# Patient Record
Sex: FEMALE | Race: WHITE | NOT HISPANIC OR LATINO | Employment: FULL TIME | URBAN - NONMETROPOLITAN AREA
[De-identification: names, ages, dates, MRNs, and addresses within clinical notes are randomized per-mention and may not be internally consistent; named-entity substitution may affect disease eponyms.]

---

## 2018-05-23 ENCOUNTER — OFFICE VISIT (OUTPATIENT)
Dept: SURGERY | Facility: CLINIC | Age: 68
End: 2018-05-23

## 2018-05-23 VITALS
HEIGHT: 68 IN | DIASTOLIC BLOOD PRESSURE: 89 MMHG | SYSTOLIC BLOOD PRESSURE: 140 MMHG | HEART RATE: 61 BPM | BODY MASS INDEX: 28.76 KG/M2 | WEIGHT: 189.8 LBS

## 2018-05-23 DIAGNOSIS — Z80.41 FAMILY HISTORY OF OVARIAN CANCER: ICD-10-CM

## 2018-05-23 DIAGNOSIS — N63.0 BREAST MASS: Primary | ICD-10-CM

## 2018-05-23 PROCEDURE — 99213 OFFICE O/P EST LOW 20 MIN: CPT | Performed by: SURGERY

## 2018-05-23 RX ORDER — CARVEDILOL 12.5 MG/1
12.5 TABLET ORAL 2 TIMES DAILY WITH MEALS
COMMUNITY
Start: 2018-05-09 | End: 2018-11-05 | Stop reason: ALTCHOICE

## 2018-05-23 NOTE — PROGRESS NOTES
Subjective   Lyla Vazquez is a 67 y.o. female here today for lump in left breast referred by Dr. Braden.    History of Present Illness  Ms. Vazquez was seen in the office today for breast evaluation.  This is a 67-year-old female presents with a history of a left breast mass which is tender.  The patient underwent a bilateral diagnostic mammogram on 4/26/18 which demonstrated a right breast asymmetry.  The patient then underwent bilateral ultrasound on 5/15/18 which was negative.  The patient denies nipple discharge.  There is no prior history of breast biopsy or cyst aspiration.  As far as risk factors go, Lyla was 29 at the time that she had her first child, with an onset of menses at age 15.  Family history is positive for mother with ovarian cancer in a sister with ovarian cancer at age 47.  The patient underwent a complete hysterectomy in 1996.  Allergies   Allergen Reactions   • Sulfa Antibiotics      Current Outpatient Prescriptions   Medication Sig Dispense Refill   • amitriptyline (ELAVIL) 10 MG tablet Take  by mouth.     • amitriptyline (ELAVIL) 50 MG tablet Take  by mouth.     • diltiazem LA (CARDIZEM LA) 240 MG 24 hr tablet Take 1 tablet by mouth daily.     • estrogens, conjugated, (PREMARIN) 0.625 MG tablet Take 1 tablet by mouth daily.     • furosemide (LASIX) 20 MG tablet Take 1 tablet by mouth daily.     • gabapentin (NEURONTIN) 300 MG capsule Take 1 capsule by mouth 2 (two) times a day.     • HYDROcodone-acetaminophen (NORCO) 7.5-325 MG per tablet Take  by mouth. Take 1 tablet every 4 to 6 hours as needed for pain     • isosorbide mononitrate (IMDUR) 30 MG 24 hr tablet Take 1 tablet by mouth daily.     • ketoprofen (ORUDIS) 75 MG capsule Take 1 capsule by mouth 2 (two) times a day.     • lisinopril (PRINIVIL,ZESTRIL) 40 MG tablet Take  by mouth.     • carvedilol (COREG) 12.5 MG tablet        No current facility-administered medications for this visit.      Past Medical History:   Diagnosis Date   •  "Arthritis    • Arthritis    • Gastric ulcer    • Hypertension    • Myocardial infarction    • Stroke    • Varicose veins      Past Surgical History:   Procedure Laterality Date   • CHOLECYSTECTOMY      laparoscopic   • HYSTERECTOMY     • OTHER SURGICAL HISTORY      leg repair   • STOMACH SURGERY       Review of Systems  General: weight loss 40 lbs  Integumentary: negative  Eyes: negative  ENT: negative  Respiratory: negative  Gastrointestinal: loss of appetite, heartburn and diarrhea  Cardiovascular: negative  Neurological: negative  Psychiatric: negative  Hematologic/Lymphatic: negative  Genitourinary: negative  Musculoskeletal: painful joints, sore muscles and back pain  Endocrine: negative  Breasts: breast lump and breast pain        Objective   /89 (BP Location: Left arm, Patient Position: Sitting)   Pulse 61   Ht 172.7 cm (68\")   Wt 86.1 kg (189 lb 12.8 oz)   BMI 28.86 kg/m²   Physical Exam  General:  This is a WD WN white female in no acute distress  Vital signs stable, afebrile  HEENT exam:  WNL. Sclera are anicteric.  EOMI  Neck:  supple, FROM.  No JVD.  Trachea midline.  No supraclavicular adenopathy  Lungs:  Respiratory effort normal. Auscultation: Clear, without wheezes, rhonchi, rales  Heart:  Regular rate and rhythm, without murmur, gallop, rub.  No pedal edema  Breasts: On visual inspection the breasts are symmetrical.  Examination of the right breast demonstrates it to be very tender in the upper outer quadrant.  A definite discrete mass is not identifiable on examination.  There is no skin change or axillary adenopathy.  The area of patient's clinical concern she could not point with one finger a definite discrete mass, nor could one be appreciated on exam.  Examination of the left breast demonstrates no discrete mass, skin change, or axillary adenopathy.  Abdomen: Nontender, without hepatosplenomegaly  Musculoskeletal:  muscle strength/tone is normal.    Psyc:  alert, oriented x 3.  Mood " and affect are appropriate  skin:  Warm with good turgor.  Without rash or lesion  extremities:  Examination of the extremities revealed no cyanosis, clubbing or edema.    Results/Data  Mammogram and ultrasound reports and images were reviewed and I with the assessment  Procedures       Assessment/Plan   Family history of ovarian cancer  Clinical right breast mass and right breast tenderness    Given the concern for clinical findings I am going to schedule a right mammogram with tomosynthesis to further evaluate  Track genetic testing results       Discussion/Summary    Patient's Body mass index is 28.86 kg/m². BMI is above normal parameters. Recommendations include: educational material.       Errors in dictation may reflect use of voice recognition software and not all errors in transcription may have been detected prior to signing.    No future appointments.

## 2018-05-26 PROBLEM — N63.0 BREAST MASS: Status: ACTIVE | Noted: 2018-05-26

## 2018-05-26 PROBLEM — Z80.41 FAMILY HISTORY OF OVARIAN CANCER: Status: ACTIVE | Noted: 2018-05-26

## 2018-06-18 ENCOUNTER — TELEPHONE (OUTPATIENT)
Dept: SURGERY | Facility: CLINIC | Age: 68
End: 2018-06-18

## 2018-07-11 ENCOUNTER — HOSPITAL ENCOUNTER (OUTPATIENT)
Dept: MAMMOGRAPHY | Facility: HOSPITAL | Age: 68
Discharge: HOME | End: 2018-07-11
Admitting: SURGERY

## 2018-07-11 DIAGNOSIS — N63.0 BREAST MASS: ICD-10-CM

## 2018-07-11 DIAGNOSIS — N63.11 MASS OF UPPER OUTER QUADRANT OF RIGHT BREAST: ICD-10-CM

## 2018-07-11 PROCEDURE — 77065 DX MAMMO INCL CAD UNI: CPT

## 2018-07-11 PROCEDURE — G0279 TOMOSYNTHESIS, MAMMO: HCPCS | Performed by: RADIOLOGY

## 2018-07-11 PROCEDURE — 77065 DX MAMMO INCL CAD UNI: CPT | Performed by: RADIOLOGY

## 2018-11-04 NOTE — PROGRESS NOTES
[facility] at [facility]  INITIAL OFFICE CONSULT      Lyla Vazquez  1950  PCP: Joyce Braden MD    SUBJECTIVE:   Lyla Vazquez is a 68 y.o. female seen for a consultation visit regarding the following: HTN, near syncope, chest pain, edema    Chief Complaint:   Chief Complaint   Patient presents with   • Fluid Retention   • SWELLING IN LEGS   • Chest Pain          Consultation is requested by Joyce Braden MD for evaluation of Fluid Retention; SWELLING IN LEGS; and Chest Pain    Problem List:   1. Nonobstructive CAD   A)C 10/14/13: Nonobstructive CAD, Normal EF Dr. Zamora   B)Echocardiogram 10/14/13, EF 55%. No VHD. Diastolic dysfunction noted.   2. HTN  3. Chronic edema, varicose veins  4. History of CVA  5. Chronic back pain  6. 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 Chronic anemia  8. Hx of Gastric Ulcer  9. Bradycardia       History:  The patient is a pleasant 68-year-old female [facility] with Dr. Aeljo Zamora regarding history of coronary disease hypertension and lower extremity edema.  She was last seen by Dr. Zamora 2013 during this time showed a left heart catheterization revealing nonobstructive disease.  An echocardiogram revealed normal LV function with diastolic dysfunction noted.  She was placed on medications to help her Lotrimin edema with some improvement.  However, she states in the past few months her lotion edema has progressively become worse.  She also notes her blood pressure is not always completely control.  About a month ago she near-syncope when she was at work she was standing all day almost a 12 hour shift she became dizzy lightheaded has down for a while.  She feels that she was just over work that they may be not been hydrated well.  In addition above she has atypical chest pain with occasional pressure that she's had in the past.  She denies any heart flow symptoms such as orthopnea, PND or persistent edema despite  "propping her feet up.  She states her blood pressure is somewhat uncontrolled.  She also states that her heart rate has been running \"low\" on the carvedilol.  She denies any josefina syncope.  She has exertional chest pain.  She presented to her primary care provider who recommended further cardiac evaluation.      Cardiac PMH: (Old records have been reviewed and summarized below)        Past Medical History, Past Surgical History, Family history, Social History, and Medications were all reviewed with the patient today and updated as necessary.     Current Outpatient Prescriptions   Medication Sig Dispense Refill   • estrogens, conjugated, (PREMARIN) 0.625 MG tablet Take 1 tablet by mouth daily.     • gabapentin (NEURONTIN) 300 MG capsule Take 600 mg by mouth 3 (Three) Times a Day.     • HYDROcodone-acetaminophen (NORCO) 7.5-325 MG per tablet Take  by mouth 2 (Two) Times a Day. Take 1 tablet every 4 to 6 hours as needed for pain     • omeprazole (priLOSEC) 40 MG capsule Take 40 mg by mouth As Needed.     • carvedilol (COREG) 6.25 MG tablet Take 1 tablet by mouth 2 (Two) Times a Day. 60 tablet 6   • lisinopril-hydrochlorothiazide (ZESTORETIC) 10-12.5 MG per tablet Take 1 tablet by mouth Daily. 30 tablet 6     No current facility-administered medications for this visit.      Allergies   Allergen Reactions   • Sulfa Antibiotics          Past Medical History:   Diagnosis Date   • Arthritis    • Arthritis    • Gastric ulcer    • Hypertension    • Myocardial infarction (CMS/HCC)    • Stroke (CMS/HCC)    • Varicose veins      Past Surgical History:   Procedure Laterality Date   • CHOLECYSTECTOMY      laparoscopic   • HYSTERECTOMY      benign   • OTHER SURGICAL HISTORY      leg repair   • STOMACH SURGERY       Family History   Problem Relation Age of Onset   • Diabetes Other    • Hypertension Other    • Ovarian cancer Other    • Cancer Mother         bladder   • Cancer Sister         liver   • Diabetes Sister    • Diabetes " "Brother    • Breast cancer Neg Hx      Social History   Substance Use Topics   • Smoking status: Never Smoker   • Smokeless tobacco: Never Used   • Alcohol use No       ROS:  Review of Symptoms:  General: no recent weight loss/gain, weakness or fatigue  Skin: no rashes, lumps, or other skin changes  HEENT: Near syncope when at work, now resolved.   Respiratory: SOB.   Cardiovascular: no palpitations, and tachycardia  Gastrointestinal: no black/tarry stools or diarrhea  Urinary: no change in frequency or urgency  Peripheral Vascular: no claudication or leg cramps  Musculoskeletal: OA  Psychiatric: no depression or excessive stress  Neurological: Peripheral neuropathy  Hematologic: Hx of chronic anemia.   Endocrine: no thyroid problems, nor heat or cold intolerance       PHYSICAL EXAM:   /82 (BP Location: Left arm, Patient Position: Sitting)   Pulse 62   Ht 172.7 cm (68\")   Wt 81.2 kg (179 lb)   BMI 27.22 kg/m²      Wt Readings from Last 5 Encounters:   11/05/18 81.2 kg (179 lb)   05/23/18 86.1 kg (189 lb 12.8 oz)   10/13/16 96.6 kg (213 lb)   09/28/16 96.6 kg (213 lb)   06/06/16 104 kg (230 lb)     BP Readings from Last 5 Encounters:   11/05/18 144/82   05/23/18 140/89   10/13/16 120/85   09/28/16 135/69   03/08/16 120/99       General-Well Nourished, Well developed  Eyes - PERRLA  Neck- supple, No mass  CV- regular rate and rhythm, no MRG  Lung- clear bilaterally  Abd- soft, +BS  Musc/skel - Norm strength and range of motion  Skin- warm and dry  Neuro - Alert & Oriented x 3, appropriate mood.    Medical problems and test results were reviewed with the patient today.     Results for orders placed or performed in visit on 10/07/16   Ferritin   Result Value Ref Range    Ferritin 37.00 10.00 - 290.30 ng/mL   Iron Profile   Result Value Ref Range    Iron 31 (L) 49 - 151 mcg/dL    TIBC 425 (H) 241 - 421 mcg/dL    Iron Saturation 7 (L) 15 - 50 %   Vitamin B12   Result Value Ref Range    Vitamin B-12 1,311 (H) 211 " - 911 pg/mL   Methylmalonic Acid, Serum   Result Value Ref Range    Methylmalonic Acid 183 0 - 378 nmol/L   Basic Metabolic Panel   Result Value Ref Range    Glucose 81 70 - 110 mg/dL    BUN 13 7 - 21 mg/dL    Creatinine 0.74 0.43 - 1.29 mg/dL    Sodium 144 135 - 153 mmol/L    Potassium 4.3 3.5 - 5.3 mmol/L    Chloride 110 99 - 112 mmol/L    CO2 30.0 24.3 - 31.9 mmol/L    Calcium 9.1 7.7 - 10.0 mg/dL    eGFR Non African Amer 79 >60 mL/min/1.73    BUN/Creatinine Ratio 17.6 7.0 - 25.0    Anion Gap 4.0 3.6 - 11.2 mmol/L   CBC Auto Differential   Result Value Ref Range    WBC 3.82 (L) 4.50 - 12.50 10*3/mm3    RBC 3.80 (L) 4.20 - 5.40 10*6/mm3    Hemoglobin 9.7 (L) 12.0 - 16.0 g/dL    Hematocrit 32.8 (L) 37.0 - 47.0 %    MCV 86.3 80.0 - 94.0 fL    MCH 25.5 (L) 27.0 - 33.0 pg    MCHC 29.6 (L) 33.0 - 37.0 g/dL    RDW 19.3 (H) 11.5 - 14.5 %    RDW-SD 59.8 (H) 37.0 - 54.0 fl    MPV 11.1 (H) 6.0 - 10.0 fL    Platelets 240 130 - 400 10*3/mm3    Neutrophil % 70.3 (H) 30.0 - 70.0 %    Lymphocyte % 16.8 (L) 21.0 - 51.0 %    Monocyte % 9.2 0.0 - 10.0 %    Eosinophil % 2.9 0.0 - 5.0 %    Basophil % 0.5 0.0 - 2.0 %    Immature Grans % 0.3 0.0 - 0.5 %    Neutrophils, Absolute 2.69 1.40 - 6.50 10*3/mm3    Lymphocytes, Absolute 0.64 (L) 1.00 - 3.00 10*3/mm3    Monocytes, Absolute 0.35 0.10 - 0.90 10*3/mm3    Eosinophils, Absolute 0.11 0.00 - 0.70 10*3/mm3    Basophils, Absolute 0.02 0.00 - 0.30 10*3/mm3    Immature Grans, Absolute 0.01 0.00 - 0.03 10*3/mm3         No results found for: CHOL, HDL, HDLC, LDL, LDLC, VLDL    EKG:  (EKG/Tracing has been independently visualized by me and summarized below)      ECG 12 Lead  Date/Time: 11/5/2018 1:55 PM  Performed by: JUAN BRADLEY  Authorized by: JUAN BRADLEY   Comparison: not compared with previous ECG   Rhythm: sinus bradycardia  Rate: bradycardic  Conduction: conduction normal  ST Segments: ST segments normal  T Waves: T waves normal  QRS axis: normal  Other: no other  findings  Clinical impression: abnormal ECG          ASSESSMENT   1. Chest pain: Remote Nonobsturtive disease by Cath 2013.  Repeat MPS rule out ischemia.   2. HTN: uncontrolled  2. Chronic, iron def. Anemia. Follow up with PCP.     3. Lower extremity Edema, varicose veins  4. Bradycardia.     PLAN  · Reduce Coreg to 6.25mg BID  · Add Zestoretic 10/12.5 mg daily with follow up BMP in 5 days.  · FLP  · Echocardiogram: regarding Edema.   · Stress MPS to rule out ischemia  · Follow up with Dr Zamora in one month in [facility]     Cardiology/Electrophysiology  11/05/18  11:59 AM  Will Dontae PEREZ

## 2018-11-05 ENCOUNTER — CONSULT (OUTPATIENT)
Dept: CARDIOLOGY | Facility: CLINIC | Age: 68
End: 2018-11-05

## 2018-11-05 VITALS
HEART RATE: 62 BPM | HEIGHT: 68 IN | SYSTOLIC BLOOD PRESSURE: 144 MMHG | WEIGHT: 179 LBS | DIASTOLIC BLOOD PRESSURE: 82 MMHG | BODY MASS INDEX: 27.13 KG/M2

## 2018-11-05 DIAGNOSIS — I10 ESSENTIAL HYPERTENSION: ICD-10-CM

## 2018-11-05 DIAGNOSIS — R60.0 LOCALIZED EDEMA: Primary | ICD-10-CM

## 2018-11-05 DIAGNOSIS — R55 VASOVAGAL NEAR SYNCOPE: ICD-10-CM

## 2018-11-05 DIAGNOSIS — I20.8 OTHER FORMS OF ANGINA PECTORIS (HCC): ICD-10-CM

## 2018-11-05 PROBLEM — R07.89 OTHER CHEST PAIN: Status: ACTIVE | Noted: 2018-11-05

## 2018-11-05 PROCEDURE — 99204 OFFICE O/P NEW MOD 45 MIN: CPT | Performed by: PHYSICIAN ASSISTANT

## 2018-11-05 PROCEDURE — 93000 ELECTROCARDIOGRAM COMPLETE: CPT | Performed by: PHYSICIAN ASSISTANT

## 2018-11-05 RX ORDER — LISINOPRIL AND HYDROCHLOROTHIAZIDE 12.5; 1 MG/1; MG/1
1 TABLET ORAL DAILY
Qty: 30 TABLET | Refills: 6 | Status: SHIPPED | OUTPATIENT
Start: 2018-11-05 | End: 2018-12-17 | Stop reason: HOSPADM

## 2018-11-05 RX ORDER — OMEPRAZOLE 40 MG/1
40 CAPSULE, DELAYED RELEASE ORAL AS NEEDED
COMMUNITY
End: 2020-02-28

## 2018-11-05 RX ORDER — CARVEDILOL 6.25 MG/1
6.25 TABLET ORAL 2 TIMES DAILY
Qty: 60 TABLET | Refills: 6 | Status: ON HOLD | OUTPATIENT
Start: 2018-11-05 | End: 2018-12-17 | Stop reason: SDUPTHER

## 2018-11-26 ENCOUNTER — HOSPITAL ENCOUNTER (OUTPATIENT)
Dept: CARDIOLOGY | Facility: HOSPITAL | Age: 68
Discharge: HOME | End: 2018-11-26

## 2018-11-26 ENCOUNTER — HOSPITAL ENCOUNTER (OUTPATIENT)
Dept: CARDIOLOGY | Facility: HOSPITAL | Age: 68
Discharge: HOME | End: 2018-11-26
Admitting: PHYSICIAN ASSISTANT

## 2018-11-26 VITALS
BODY MASS INDEX: 28.73 KG/M2 | SYSTOLIC BLOOD PRESSURE: 172 MMHG | WEIGHT: 189.6 LBS | DIASTOLIC BLOOD PRESSURE: 84 MMHG | HEIGHT: 68 IN

## 2018-11-26 VITALS — BODY MASS INDEX: 27.13 KG/M2 | HEIGHT: 68 IN | WEIGHT: 179 LBS

## 2018-11-26 DIAGNOSIS — I10 ESSENTIAL HYPERTENSION: ICD-10-CM

## 2018-11-26 DIAGNOSIS — I20.8 OTHER FORMS OF ANGINA PECTORIS (HCC): ICD-10-CM

## 2018-11-26 DIAGNOSIS — R55 VASOVAGAL NEAR SYNCOPE: ICD-10-CM

## 2018-11-26 DIAGNOSIS — R60.0 LOCALIZED EDEMA: ICD-10-CM

## 2018-11-26 LAB
BH CV ECHO MEAS - AO ROOT DIAM: 2.7 CM
BH CV ECHO MEAS - EF(MOD-BP): 67 %
BH CV ECHO MEAS - ESV(MOD-SP2): 42.2 ML
BH CV ECHO MEAS - IVSD: 0.8 CM
BH CV ECHO MEAS - LA DIMENSION: 3.9 CM
BH CV ECHO MEAS - LVIDD: 4.7 CM
BH CV ECHO MEAS - LVIDS: 3.2 CM
BH CV ECHO MEAS - LVPWD: 0.8 CM
BH CV ECHO MEAS - RAP SYSTOLE: 8 MMHG
BH CV ECHO MEAS - RVSP: 37 MMHG
BH CV ECHO MEAS - TR MAX PG: 29
BH CV ECHO MEAS - TR MAX VEL: 271 CM/SEC
BH CV VAS BP LEFT ARM: NORMAL MMHG
BH CV XLRA - RV BASE: 3.8 CM
BH CV XLRA - RV LENGTH: 5.8 CM
BH CV XLRA - RV MID: 2.8 CM
LEFT ATRIUM VOLUME INDEX: 32.5 ML/M2
LV EF 2D ECHO EST: 65 %
MAXIMAL PREDICTED HEART RATE: 154 BPM
STRESS TARGET HR: 131 BPM

## 2018-11-26 PROCEDURE — 93306 TTE W/DOPPLER COMPLETE: CPT | Performed by: INTERNAL MEDICINE

## 2018-11-26 PROCEDURE — 0 TECHNETIUM SESTAMIBI: Performed by: INTERNAL MEDICINE

## 2018-11-26 PROCEDURE — 93017 CV STRESS TEST TRACING ONLY: CPT

## 2018-11-26 PROCEDURE — 78452 HT MUSCLE IMAGE SPECT MULT: CPT

## 2018-11-26 PROCEDURE — 93018 CV STRESS TEST I&R ONLY: CPT | Performed by: INTERNAL MEDICINE

## 2018-11-26 PROCEDURE — 93306 TTE W/DOPPLER COMPLETE: CPT

## 2018-11-26 PROCEDURE — 78452 HT MUSCLE IMAGE SPECT MULT: CPT | Performed by: INTERNAL MEDICINE

## 2018-11-26 PROCEDURE — A9500 TC99M SESTAMIBI: HCPCS | Performed by: INTERNAL MEDICINE

## 2018-11-26 RX ADMIN — TECHNETIUM TC 99M SESTAMIBI 1 DOSE: 1 INJECTION INTRAVENOUS at 10:59

## 2018-11-26 RX ADMIN — TECHNETIUM TC 99M SESTAMIBI 1 DOSE: 1 INJECTION INTRAVENOUS at 09:05

## 2018-11-27 LAB
BH CV STRESS BP STAGE 1: NORMAL
BH CV STRESS BP STAGE 2: NORMAL
BH CV STRESS DURATION MIN STAGE 1: 3
BH CV STRESS DURATION MIN STAGE 2: 1
BH CV STRESS DURATION SEC STAGE 1: 0
BH CV STRESS DURATION SEC STAGE 2: 30
BH CV STRESS GRADE STAGE 1: 10
BH CV STRESS GRADE STAGE 2: 12
BH CV STRESS HR STAGE 1: 115
BH CV STRESS HR STAGE 2: 147
BH CV STRESS METS STAGE 1: 5
BH CV STRESS METS STAGE 2: 5.9
BH CV STRESS O2 STAGE 2: 98
BH CV STRESS PROTOCOL 1: NORMAL
BH CV STRESS RECOVERY BP: NORMAL MMHG
BH CV STRESS RECOVERY HR: 79 BPM
BH CV STRESS RECOVERY O2: 98 %
BH CV STRESS SPEED STAGE 1: 1.7
BH CV STRESS SPEED STAGE 2: 2.5
BH CV STRESS STAGE 1: 1
BH CV STRESS STAGE 2: 2
LV EF NUC BP: 58 %
MAXIMAL PREDICTED HEART RATE: 154 BPM
PERCENT MAX PREDICTED HR: 92.86 %
STRESS BASELINE BP: NORMAL MMHG
STRESS BASELINE HR: 68 BPM
STRESS O2 SAT REST: 99 %
STRESS PERCENT HR: 109 %
STRESS POST ESTIMATED WORKLOAD: 5.9 METS
STRESS POST EXERCISE DUR MIN: 4 MIN
STRESS POST EXERCISE DUR SEC: 30 SEC
STRESS POST O2 SAT PEAK: 98 %
STRESS POST PEAK BP: NORMAL MMHG
STRESS POST PEAK HR: 143 BPM
STRESS TARGET HR: 131 BPM

## 2018-11-29 ENCOUNTER — TELEPHONE (OUTPATIENT)
Dept: CARDIOLOGY | Facility: CLINIC | Age: 68
End: 2018-11-29

## 2018-11-29 DIAGNOSIS — R94.39 ABNORMAL STRESS TEST: Primary | ICD-10-CM

## 2018-11-29 RX ORDER — ISOSORBIDE MONONITRATE 30 MG/1
30 TABLET, EXTENDED RELEASE ORAL DAILY
Qty: 30 TABLET | Refills: 11 | Status: SHIPPED | OUTPATIENT
Start: 2018-11-29 | End: 2018-12-17 | Stop reason: HOSPADM

## 2018-11-29 NOTE — TELEPHONE ENCOUNTER
Reviewed stress test reviewed with the patient. Per Will S and HTR, schedule [facility] d/t abnormal stress test. Patient agreed. Will start isosorbide and ASA.

## 2018-12-03 PROBLEM — R94.39 ABNORMAL STRESS TEST: Status: ACTIVE | Noted: 2018-12-03

## 2018-12-04 ENCOUNTER — PREP FOR SURGERY (OUTPATIENT)
Dept: OTHER | Facility: HOSPITAL | Age: 68
End: 2018-12-04

## 2018-12-04 DIAGNOSIS — R94.39 ABNORMAL STRESS TEST: Primary | ICD-10-CM

## 2018-12-04 RX ORDER — ACETAMINOPHEN 325 MG/1
650 TABLET ORAL EVERY 4 HOURS PRN
Status: CANCELLED | OUTPATIENT
Start: 2018-12-04

## 2018-12-04 RX ORDER — SODIUM CHLORIDE 0.9 % (FLUSH) 0.9 %
3 SYRINGE (ML) INJECTION EVERY 12 HOURS SCHEDULED
Status: CANCELLED | OUTPATIENT
Start: 2018-12-04

## 2018-12-04 RX ORDER — NITROGLYCERIN 0.4 MG/1
0.4 TABLET SUBLINGUAL
Status: CANCELLED | OUTPATIENT
Start: 2018-12-04

## 2018-12-04 RX ORDER — ASPIRIN 81 MG/1
324 TABLET, CHEWABLE ORAL ONCE
Status: CANCELLED | OUTPATIENT
Start: 2018-12-04 | End: 2018-12-04

## 2018-12-04 RX ORDER — ASPIRIN 81 MG/1
81 TABLET ORAL DAILY
Status: CANCELLED | OUTPATIENT
Start: 2018-12-05

## 2018-12-04 RX ORDER — SODIUM CHLORIDE 0.9 % (FLUSH) 0.9 %
3-10 SYRINGE (ML) INJECTION AS NEEDED
Status: CANCELLED | OUTPATIENT
Start: 2018-12-04

## 2018-12-04 RX ORDER — SODIUM CHLORIDE 9 MG/ML
3 INJECTION, SOLUTION INTRAVENOUS CONTINUOUS
Status: CANCELLED | OUTPATIENT
Start: 2018-12-04 | End: 2018-12-04

## 2018-12-16 ENCOUNTER — HOSPITAL ENCOUNTER (OUTPATIENT)
Dept: GENERAL RADIOLOGY | Facility: HOSPITAL | Age: 68
Discharge: HOME | End: 2018-12-16
Admitting: NURSE PRACTITIONER

## 2018-12-16 ENCOUNTER — APPOINTMENT (OUTPATIENT)
Dept: PREADMISSION TESTING | Facility: HOSPITAL | Age: 68
End: 2018-12-16

## 2018-12-16 DIAGNOSIS — R94.39 ABNORMAL STRESS TEST: ICD-10-CM

## 2018-12-16 LAB
ALBUMIN SERPL-MCNC: 4 G/DL (ref 3.2–4.8)
ALBUMIN/GLOB SERPL: 2.4 G/DL (ref 1.5–2.5)
ALP SERPL-CCNC: 93 U/L (ref 25–100)
ALT SERPL W P-5'-P-CCNC: 7 U/L (ref 7–40)
ANION GAP SERPL CALCULATED.3IONS-SCNC: 6 MMOL/L (ref 3–11)
ARTICHOKE IGE QN: 66 MG/DL (ref 0–130)
AST SERPL-CCNC: 15 U/L (ref 0–33)
BASOPHILS # BLD AUTO: 0.01 10*3/MM3 (ref 0–0.2)
BASOPHILS NFR BLD AUTO: 0.2 % (ref 0–1)
BILIRUB SERPL-MCNC: 1 MG/DL (ref 0.3–1.2)
BUN BLD-MCNC: 11 MG/DL (ref 9–23)
BUN/CREAT SERPL: 14.5 (ref 7–25)
CALCIUM SPEC-SCNC: 8.9 MG/DL (ref 8.7–10.4)
CHLORIDE SERPL-SCNC: 104 MMOL/L (ref 99–109)
CHOLEST SERPL-MCNC: 138 MG/DL (ref 0–200)
CO2 SERPL-SCNC: 30 MMOL/L (ref 20–31)
CREAT BLD-MCNC: 0.76 MG/DL (ref 0.6–1.3)
DEPRECATED RDW RBC AUTO: 50.9 FL (ref 37–54)
EOSINOPHIL # BLD AUTO: 0.04 10*3/MM3 (ref 0–0.3)
EOSINOPHIL NFR BLD AUTO: 0.9 % (ref 0–3)
ERYTHROCYTE [DISTWIDTH] IN BLOOD BY AUTOMATED COUNT: 15.3 % (ref 11.3–14.5)
GFR SERPL CREATININE-BSD FRML MDRD: 76 ML/MIN/1.73
GLOBULIN UR ELPH-MCNC: 1.7 GM/DL
GLUCOSE BLD-MCNC: 106 MG/DL (ref 70–100)
HBA1C MFR BLD: 5.3 % (ref 4.8–5.6)
HCT VFR BLD AUTO: 39.3 % (ref 34.5–44)
HDLC SERPL-MCNC: 68 MG/DL (ref 40–60)
HGB BLD-MCNC: 12.7 G/DL (ref 11.5–15.5)
IMM GRANULOCYTES # BLD: 0 10*3/MM3 (ref 0–0.03)
IMM GRANULOCYTES NFR BLD: 0 % (ref 0–0.6)
LYMPHOCYTES # BLD AUTO: 1.08 10*3/MM3 (ref 0.6–4.8)
LYMPHOCYTES NFR BLD AUTO: 23.3 % (ref 24–44)
MCH RBC QN AUTO: 29.8 PG (ref 27–31)
MCHC RBC AUTO-ENTMCNC: 32.3 G/DL (ref 32–36)
MCV RBC AUTO: 92.3 FL (ref 80–99)
MONOCYTES # BLD AUTO: 0.27 10*3/MM3 (ref 0–1)
MONOCYTES NFR BLD AUTO: 5.8 % (ref 0–12)
NEUTROPHILS # BLD AUTO: 3.23 10*3/MM3 (ref 1.5–8.3)
NEUTROPHILS NFR BLD AUTO: 69.8 % (ref 41–71)
PLATELET # BLD AUTO: 119 10*3/MM3 (ref 150–450)
PMV BLD AUTO: 12.5 FL (ref 6–12)
POTASSIUM BLD-SCNC: 4.1 MMOL/L (ref 3.5–5.5)
PROT SERPL-MCNC: 5.7 G/DL (ref 5.7–8.2)
RBC # BLD AUTO: 4.26 10*6/MM3 (ref 3.89–5.14)
SODIUM BLD-SCNC: 140 MMOL/L (ref 132–146)
TRIGL SERPL-MCNC: 67 MG/DL (ref 0–150)
WBC NRBC COR # BLD: 4.63 10*3/MM3 (ref 3.5–10.8)

## 2018-12-16 PROCEDURE — 80061 LIPID PANEL: CPT | Performed by: NURSE PRACTITIONER

## 2018-12-16 PROCEDURE — 80053 COMPREHEN METABOLIC PANEL: CPT | Performed by: NURSE PRACTITIONER

## 2018-12-16 PROCEDURE — 85025 COMPLETE CBC W/AUTO DIFF WBC: CPT | Performed by: NURSE PRACTITIONER

## 2018-12-16 PROCEDURE — 83036 HEMOGLOBIN GLYCOSYLATED A1C: CPT | Performed by: NURSE PRACTITIONER

## 2018-12-16 PROCEDURE — 71046 X-RAY EXAM CHEST 2 VIEWS: CPT

## 2018-12-16 PROCEDURE — 36415 COLL VENOUS BLD VENIPUNCTURE: CPT

## 2018-12-16 NOTE — DISCHARGE INSTRUCTIONS
The following instructions given during Pre Admission Testing visit:    Do not eat or drink anything after MN except for sips of water with your a.m. Prescription meds unless otherwise instructed by your physician.    Glasses and jewelry may be worn, but dentures must be removed prior to cath/procedure.    Leave any items you consider valuable at home.    Family members may wait in [facility] waiting area during procedure.    Bring all medications in their original containers the day of procedure.    Bring photo ID and insurance cards on the day of procedure.    Need to make arrangements for transportation prior to discharge.    The following handouts were given:     Heart Cath pathway (if applicable)   Cardiac Cath booklet published by [employer]    OR appropriate [facility] procedure booklet    If applicable, pt instructed to bring CPAP mask and tubing the day of procedure.

## 2018-12-17 ENCOUNTER — HOSPITAL ENCOUNTER (OUTPATIENT)
Facility: HOSPITAL | Age: 68
Discharge: HOME | End: 2018-12-17
Attending: INTERNAL MEDICINE | Admitting: INTERNAL MEDICINE

## 2018-12-17 VITALS
BODY MASS INDEX: 26.06 KG/M2 | HEIGHT: 68 IN | SYSTOLIC BLOOD PRESSURE: 164 MMHG | OXYGEN SATURATION: 99 % | DIASTOLIC BLOOD PRESSURE: 74 MMHG | WEIGHT: 171.96 LBS | RESPIRATION RATE: 16 BRPM | TEMPERATURE: 97.6 F | HEART RATE: 56 BPM

## 2018-12-17 DIAGNOSIS — R94.39 ABNORMAL STRESS TEST: ICD-10-CM

## 2018-12-17 DIAGNOSIS — I25.119 CORONARY ARTERY DISEASE INVOLVING NATIVE CORONARY ARTERY OF NATIVE HEART WITH ANGINA PECTORIS (HCC): Primary | ICD-10-CM

## 2018-12-17 DIAGNOSIS — E78.5 HYPERLIPIDEMIA LDL GOAL <70: ICD-10-CM

## 2018-12-17 DIAGNOSIS — I10 ESSENTIAL HYPERTENSION: ICD-10-CM

## 2018-12-17 PROBLEM — R55 VASOVAGAL NEAR SYNCOPE: Status: RESOLVED | Noted: 2018-11-05 | Resolved: 2018-12-17

## 2018-12-17 PROBLEM — R07.89 OTHER CHEST PAIN: Status: RESOLVED | Noted: 2018-11-05 | Resolved: 2018-12-17

## 2018-12-17 PROCEDURE — C1769 GUIDE WIRE: HCPCS | Performed by: INTERNAL MEDICINE

## 2018-12-17 PROCEDURE — 0 IOPAMIDOL PER 1 ML: Performed by: INTERNAL MEDICINE

## 2018-12-17 PROCEDURE — 25010000002 FENTANYL CITRATE (PF) 100 MCG/2ML SOLUTION: Performed by: INTERNAL MEDICINE

## 2018-12-17 PROCEDURE — 25010000002 MIDAZOLAM PER 1 MG: Performed by: INTERNAL MEDICINE

## 2018-12-17 PROCEDURE — S0260 H&P FOR SURGERY: HCPCS | Performed by: PHYSICIAN ASSISTANT

## 2018-12-17 PROCEDURE — 93458 L HRT ARTERY/VENTRICLE ANGIO: CPT | Performed by: INTERNAL MEDICINE

## 2018-12-17 PROCEDURE — C1894 INTRO/SHEATH, NON-LASER: HCPCS | Performed by: INTERNAL MEDICINE

## 2018-12-17 PROCEDURE — 25010000002 HEPARIN (PORCINE) PER 1000 UNITS: Performed by: INTERNAL MEDICINE

## 2018-12-17 RX ORDER — LISINOPRIL AND HYDROCHLOROTHIAZIDE 25; 20 MG/1; MG/1
1 TABLET ORAL DAILY
Qty: 30 TABLET | Refills: 5 | Status: SHIPPED | OUTPATIENT
Start: 2018-12-17 | End: 2020-10-30 | Stop reason: SDUPTHER

## 2018-12-17 RX ORDER — SODIUM CHLORIDE 9 MG/ML
3 INJECTION, SOLUTION INTRAVENOUS CONTINUOUS
Status: ACTIVE | OUTPATIENT
Start: 2018-12-17 | End: 2018-12-17

## 2018-12-17 RX ORDER — SODIUM CHLORIDE 0.9 % (FLUSH) 0.9 %
3-10 SYRINGE (ML) INJECTION AS NEEDED
Status: DISCONTINUED | OUTPATIENT
Start: 2018-12-17 | End: 2018-12-17 | Stop reason: HOSPADM

## 2018-12-17 RX ORDER — ATORVASTATIN CALCIUM 20 MG/1
20 TABLET, FILM COATED ORAL DAILY
Qty: 30 TABLET | Refills: 5 | Status: SHIPPED | OUTPATIENT
Start: 2018-12-17 | End: 2019-06-09 | Stop reason: SDUPTHER

## 2018-12-17 RX ORDER — ACETAMINOPHEN 325 MG/1
650 TABLET ORAL EVERY 4 HOURS PRN
Status: DISCONTINUED | OUTPATIENT
Start: 2018-12-17 | End: 2018-12-17 | Stop reason: HOSPADM

## 2018-12-17 RX ORDER — LIDOCAINE HYDROCHLORIDE 10 MG/ML
INJECTION, SOLUTION INFILTRATION; PERINEURAL AS NEEDED
Status: DISCONTINUED | OUTPATIENT
Start: 2018-12-17 | End: 2018-12-17 | Stop reason: HOSPADM

## 2018-12-17 RX ORDER — MIDAZOLAM HYDROCHLORIDE 1 MG/ML
INJECTION INTRAMUSCULAR; INTRAVENOUS AS NEEDED
Status: DISCONTINUED | OUTPATIENT
Start: 2018-12-17 | End: 2018-12-17 | Stop reason: HOSPADM

## 2018-12-17 RX ORDER — NITROGLYCERIN 0.4 MG/1
0.4 TABLET SUBLINGUAL
Status: DISCONTINUED | OUTPATIENT
Start: 2018-12-17 | End: 2018-12-17 | Stop reason: HOSPADM

## 2018-12-17 RX ORDER — ASPIRIN 81 MG/1
324 TABLET, CHEWABLE ORAL ONCE
Status: COMPLETED | OUTPATIENT
Start: 2018-12-17 | End: 2018-12-17

## 2018-12-17 RX ORDER — ASPIRIN 81 MG/1
81 TABLET ORAL DAILY
Qty: 30 TABLET | Refills: 5 | Status: SHIPPED | OUTPATIENT
Start: 2018-12-18 | End: 2018-12-18

## 2018-12-17 RX ORDER — ASPIRIN 81 MG/1
81 TABLET ORAL DAILY
Status: DISCONTINUED | OUTPATIENT
Start: 2018-12-18 | End: 2018-12-17 | Stop reason: HOSPADM

## 2018-12-17 RX ORDER — CARVEDILOL 3.12 MG/1
3.12 TABLET ORAL 2 TIMES DAILY
Qty: 60 TABLET | Refills: 5 | Status: SHIPPED | OUTPATIENT
Start: 2018-12-17 | End: 2018-12-17 | Stop reason: HOSPADM

## 2018-12-17 RX ORDER — FENTANYL CITRATE 50 UG/ML
INJECTION, SOLUTION INTRAMUSCULAR; INTRAVENOUS AS NEEDED
Status: DISCONTINUED | OUTPATIENT
Start: 2018-12-17 | End: 2018-12-17 | Stop reason: HOSPADM

## 2018-12-17 RX ORDER — SODIUM CHLORIDE 0.9 % (FLUSH) 0.9 %
3 SYRINGE (ML) INJECTION EVERY 12 HOURS SCHEDULED
Status: DISCONTINUED | OUTPATIENT
Start: 2018-12-17 | End: 2018-12-17 | Stop reason: HOSPADM

## 2018-12-17 RX ADMIN — SODIUM CHLORIDE 3 ML/KG/HR: 9 INJECTION, SOLUTION INTRAVENOUS at 07:15

## 2018-12-17 RX ADMIN — ASPIRIN 81 MG 324 MG: 81 TABLET ORAL at 07:14

## 2018-12-17 NOTE — H&P
[facility] at [facility]  CARDIOLOGY     Date of  Admission: 12/17/2018  6:37 AM     Problem List:   1. Nonobstructive CAD              A)LHC 10/14/13: Nonobstructive CAD, Normal EF Dr. Zamora              B)Echocardiogram 10/14/13, EF 55%. No VHD. Diastolic dysfunction  noted.    C)Abnormal Stress test with EKG changes and chest pain during test.  Normal EF.   11/18   D)Echocardiogram Aortic valve sclerosis, no stenosis, mild pulmonary  HTN with Moderate TR. 11/18  2. HTN  3. Chronic edema, varicose veins  4. History of CVA  5. Chronic back pain  6. 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 Chronic anemia  8. Hx of Gastric Ulcer  9. Bradycardia-Decrease BB.     HPI:  Lyla Vazquez is a 68 y.o. female admitted for Abnormal stress test [R94.39].  The patient is a 68-year-old female presents today for left heart catheterization after abnormal stress test.  The patient reports chest pain described as a pressure sensation that can occur randomly. She has had one episode with chest pain radiating into both arms lasting about 30 min.   Occasionally this can occur with exertion at work.  She also had difficulty with severe lower extremity edema.  She had episodes of near syncope while standing on her feet for long hours at work.  She denies any tach palpitations.  She denies any orthopnea or PND.  She is a difficult time controlling her blood pressure is sometimes labile.  Additionally she has had to decrease her ta beta blocker therapy secondary to history of bradycardia.  She was sent to primary care provider regarding referred to cardiology.  She is evaluated in the office for stress test: considered abnormal with chest pain reproduced during the study as well as ST ST wave changes. Normal EF.      Patient Active Problem List   Diagnosis   • Varicose veins   • Hematoma of left lower extremity   • Breast mass   • Family history of ovarian cancer   • Localized edema   • Essential hypertension   • Other chest pain    • Vasovagal near syncope   • Abnormal stress test       Past Medical History:   Diagnosis Date   • Anesthesia complication     HARD TO WAKE   • Arthritis    • Arthritis    • Gastric ulcer    • Hypertension    • Myocardial infarction (CMS/HCC)    • Stroke (CMS/HCC)    • Varicose veins    • Wears dentures    • Wears glasses       Past Surgical History:   Procedure Laterality Date   • CARDIAC CATHETERIZATION     • CHOLECYSTECTOMY      laparoscopic   • COLONOSCOPY  2014   • HYSTERECTOMY      benign   • OTHER SURGICAL HISTORY      leg repair   • STOMACH SURGERY       Allergies   Allergen Reactions   • Sulfa Antibiotics       Family History   Problem Relation Age of Onset   • Diabetes Other    • Hypertension Other    • Ovarian cancer Other    • Cancer Mother         bladder   • Cancer Sister         liver   • Diabetes Sister    • Diabetes Brother    • Breast cancer Neg Hx           Current Facility-Administered Medications:   •  acetaminophen (TYLENOL) tablet 650 mg, 650 mg, Oral, Q4H PRN, Taylor Pastrana APRN  •  [COMPLETED] aspirin chewable tablet 324 mg, 324 mg, Oral, Once, 324 mg at 12/17/18 0714 **AND** [START ON 12/18/2018] aspirin EC tablet 81 mg, 81 mg, Oral, Daily, Taylor Pastrana APREVANGELINA  •  nitroglycerin (NITROSTAT) SL tablet 0.4 mg, 0.4 mg, Sublingual, Q5 Min PRN, Taylor Pastrana APRN  •  sodium chloride 0.9 % flush 3 mL, 3 mL, Intravenous, Q12H, Taylor Pastrana, APRN  •  sodium chloride 0.9 % flush 3-10 mL, 3-10 mL, Intravenous, PRN, Taylor Pastrana, APRN      Review of Symptoms:  General: no recent weight loss/gain, weakness or fatigue  Skin: no rashes, lumps, or other skin changes  HEENT: + dizziness, lightheadedness, or vision changes  Respiratory: no cough or hemoptysis  Cardiovascular:  No palpitations, and tachycardia, + Chest pain  Gastrointestinal: no black/tarry stools or diarrhea  Urinary: no change in frequency or urgency  Peripheral Vascular: no claudication or leg  "cramps  Musculoskeletal: no muscle or joint pain/stiffness  Psychiatric: no depression or excessive stress  Neurological: no sensory or motor loss, no syncope  Hematologic: no anemia, easy bruising or bleeding  Endocrine: no thyroid problems, nor heat or cold intolerance      Physical Exam - Vitals  Vitals:    12/17/18 0650   BP: 154/97   BP Location: Left arm   Patient Position: Lying   Pulse: 60   Resp: 16   Temp: 97.6 °F (36.4 °C)   TempSrc: Temporal   SpO2: 99%   Weight: 78 kg (171 lb 15.3 oz)   Height: 172.7 cm (68\")       Physical Exam:  General-Well Nourished, Well developed  Eyes - PERRLA  Neck- supple, No mass  CV- regular rate and rhythm, no MRG  Lung- clear bilaterally  Abd- soft, +BS  Musc/skel - Norm strength and range of motion. + edema   Skin- warm and dry  Neuro - Alert & Oriented x 3, appropriate mood.      Cardiographics  (I have reviewed the EKG/Tracing from today and listed the findings below)    Telemetry: Sinus Bradycardia          Labs/Diagnostic Data  Results from last 7 days   Lab Units  12/16/18   1315   SODIUM mmol/L  140   POTASSIUM mmol/L  4.1   CHLORIDE mmol/L  104   CO2 mmol/L  30.0   BUN mg/dL  11   CREATININE mg/dL  0.76   GLUCOSE mg/dL  106*   CALCIUM mg/dL  8.9         Results from last 7 days   Lab Units  12/16/18   1315   WBC 10*3/mm3  4.63   HEMOGLOBIN g/dL  12.7   HEMATOCRIT %  39.3   PLATELETS 10*3/mm3  119*         Results from last 7 days   Lab Units  12/16/18   1315   CHOLESTEROL mg/dL  138   TRIGLYCERIDES mg/dL  67   HDL CHOL mg/dL  68*         Results from last 7 days   Lab Units  12/16/18   1315   HEMOGLOBIN A1C %  5.30                  Echo 11/26/18     · Mild to moderate tricuspid valve regurgitation is present.  · Left ventricular systolic function is normal. Estimated EF = 65%.  · Left atrial cavity size is borderline dilated.  · There is no evidence of pericardial effusion.  · Normal right ventricular cavity size, wall thickness, systolic function and septal motion " noted.  · Left ventricular diastolic function not assessed on this study.  · The aortic valve exhibits sclerosis.  · Mild pulmonary hypertension is present.  · There is no evidence of pericardial effusion.        Sress MPS 11/5/18    · Abnormal exercise stress test by clinical and EKG criteria (exercise CP and NSVT).  · Expected exercise time 6 minutes and 40 seconds. Patient exercised for 4 minutes and 30 seconds.  · Myocardial perfusion imaging indicates a normal myocardial perfusion study with no evidence of ischemia.  · Left ventricular ejection fraction is normal (Calculated EF = 58%).  · Clinical correlation required.          Assessment:      1. CAD/Chest pain: Abnormal Stress test with NSVT, and chest pain during test.    2. Hx of Near syncope  3. HTN  4. Chronic Edema  5. Bradycardia       Plan:   1. C possible CBI, risk and benefits explained in detail and patient would like to proceed.     Will Dontae PEREZ   Cardiology/Electrophysiology

## 2018-12-18 RX ORDER — ASPIRIN 81 MG/1
81 TABLET ORAL DAILY
Qty: 30 TABLET | Refills: 5 | Status: SHIPPED | OUTPATIENT
Start: 2018-12-18 | End: 2020-10-30 | Stop reason: SDUPTHER

## 2019-03-19 NOTE — PROGRESS NOTES
Encounter Date:03/22/2019    Patient ID: Lyla Vazquez is a 68 y.o. female who resides in [place], [place]    CC/Reason for visit:  Hypertension           Problem List Items Addressed This Visit        Cardiovascular and Mediastinum    Coronary artery disease involving native coronary artery of native heart with angina pectoris (CMS/HCC)    Overview     · Cardiac catheterization (10/14/13): Nonobstructive CAD, Normal EF.  · Exercise nuclear stress (11/26/18): Moderate exercise intolerance with development of chest pain and nonsustained VT. Normal perfusion images with no ischemia. LVEF 50%  · Echo (11/26/18): To moderate TR.  Normal LVEF.  Mild pulmonary hypertension  · Cardiac catheterization (12/17/18): Mild nonobstructive CAD. Normal LVEF.         Current Assessment & Plan     · Continue aspirin 81 mg daily         Relevant Medications    nitroglycerin (NITROSTAT) 0.4 MG SL tablet    Essential hypertension    Current Assessment & Plan     · Hypertension is controlled  · Continue lisinopril/hydrochlorothiazide 20/25 mg 1 tablet daily         Hyperlipidemia LDL goal <70    Overview     · High intensity statin therapy indicated given the presence of coronary artery disease, albeit mild         Current Assessment & Plan     · Continue Lipitor 20 mg daily         Pre-syncope - Primary    Current Assessment & Plan     · 2-week monitors to assess for arrhythmias to account for patient's symptoms         Relevant Orders    Cardiac Event Monitor        Patient had an episode of crushing chest pain but had nonobstructive CAD noted on cardiac cath.  I will send in sublingual nitroglycerin for her to use as needed.  I told her to contact us if she finds she is using it as we can consider trying isosorbide.  In regards to her presyncopal spells we will place a 2-week monitor on her today.  We will schedule follow-up for 6 months or sooner pending the results of her test       · Sublingual nitroglycerin as needed for chest  pain.  Patient to contact us if using frequently and we will start isosorbide  · 2-week monitor to assess for arrhythmias to account for presyncopal spells  · If monitor normal consider ordering a carotid duplex study  · Follow-up 6 months or sooner pending results of monitor        Llya Vazquez returns today for follow-up of her coronary artery disease and cardiac risk factors.  Patient underwent a cardiac catheterization in December for an abnormal stress test.  Coronary angiography revealed mild nonobstructive CAD.  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  She was walking back to her desk and developed a sudden onset of crushing chest pain that caused her to bend over.  She had to sit down for a while and after 15-20 minutes it finally subsided.  She is also been experiencing weak spells.  One day 2 weeks ago while she was driving she began to feel weakness come over her and everything started to turn black.  It felt as if her heart was going to stop and she had to beat on her chest.  After beating on her chest her symptoms subsided.  She has had other weak spells where she feels a blackness wash over her.  She feels that she would pass out if she did not sit down.  Usually after 15 or 20 minutes she feels better and is able to go about her routine.    Review of Systems   Constitution: Positive for chills, weakness and malaise/fatigue.   Eyes: Negative for vision loss in left eye and vision loss in right eye.   Cardiovascular: Positive for chest pain and leg swelling. Negative for dyspnea on exertion, near-syncope, orthopnea, palpitations, paroxysmal nocturnal dyspnea and syncope.   Endocrine: Positive for cold intolerance.   Musculoskeletal: Positive for arthritis, back pain and muscle weakness. Negative for myalgias.   Neurological: Positive for dizziness and loss of balance. Negative for brief paralysis, excessive daytime sleepiness, focal weakness,  "numbness and paresthesias.   All other systems reviewed and are negative.      The patient's past medical, social, family history and ROS reviewed in the patient's electronic medical record.    Allergies  Sulfa antibiotics    Outpatient Medications Marked as Taking for the 3/22/19 encounter (Office Visit) with Taylor Pastrana APRN   Medication Sig Dispense Refill   • aspirin 81 MG EC tablet Take 1 tablet by mouth Daily. 30 tablet 5   • atorvastatin (LIPITOR) 20 MG tablet Take 1 tablet by mouth Daily. 30 tablet 5   • estrogens, conjugated, (PREMARIN) 0.625 MG tablet Take 1 tablet by mouth daily.     • gabapentin (NEURONTIN) 300 MG capsule Take 600 mg by mouth 3 (Three) Times a Day.     • HYDROcodone-acetaminophen (NORCO) 7.5-325 MG per tablet Take 1 tablet by mouth 2 (Two) Times a Day. Take 1 tablet every 4 to 6 hours as needed for pain     • lisinopril-hydrochlorothiazide (PRINZIDE,ZESTORETIC) 20-25 MG per tablet Take 1 tablet by mouth Daily. 30 tablet 5   • omeprazole (priLOSEC) 40 MG capsule Take 40 mg by mouth As Needed.             Blood pressure 124/80, pulse 69, height 172.7 cm (68\"), weight 80.9 kg (178 lb 6.4 oz).  Body mass index is 27.13 kg/m².  There were no vitals filed for this visit.    Physical Exam   Constitutional: She is oriented to person, place, and time. She appears well-developed and well-nourished.   HENT:   Head: Normocephalic and atraumatic.   Eyes: Pupils are equal, round, and reactive to light. No scleral icterus.   Neck: No JVD present. Carotid bruit is not present. No thyromegaly present.   Cardiovascular: Normal rate and regular rhythm. Exam reveals no gallop.   No murmur heard.  Pulmonary/Chest: Effort normal and breath sounds normal.   Abdominal: Soft. She exhibits no distension. There is no hepatosplenomegaly.   Musculoskeletal: She exhibits no edema.   Neurological: She is alert and oriented to person, place, and time.   Skin: Skin is warm and dry.   Psychiatric: She has a " normal mood and affect. Her behavior is normal.       Data Review:     Procedures    Lab Results   Component Value Date    CHOL 138 12/16/2018    TRIG 67 12/16/2018    HDL 68 (H) 12/16/2018    LDL 66 12/16/2018    AST 15 12/16/2018    ALT 7 12/16/2018       Lab Results   Component Value Date    HGBA1C 5.30 12/16/2018           Xiomara Pastrana, APRN  3/22/2019

## 2019-03-22 ENCOUNTER — OFFICE VISIT (OUTPATIENT)
Dept: CARDIOLOGY | Facility: CLINIC | Age: 69
End: 2019-03-22

## 2019-03-22 VITALS
HEART RATE: 69 BPM | BODY MASS INDEX: 27.04 KG/M2 | DIASTOLIC BLOOD PRESSURE: 80 MMHG | WEIGHT: 178.4 LBS | SYSTOLIC BLOOD PRESSURE: 124 MMHG | HEIGHT: 68 IN

## 2019-03-22 DIAGNOSIS — I25.119 CORONARY ARTERY DISEASE INVOLVING NATIVE CORONARY ARTERY OF NATIVE HEART WITH ANGINA PECTORIS (HCC): ICD-10-CM

## 2019-03-22 DIAGNOSIS — E78.5 HYPERLIPIDEMIA LDL GOAL <70: ICD-10-CM

## 2019-03-22 DIAGNOSIS — R55 PRE-SYNCOPE: Primary | ICD-10-CM

## 2019-03-22 DIAGNOSIS — I10 ESSENTIAL HYPERTENSION: ICD-10-CM

## 2019-03-22 PROCEDURE — 99214 OFFICE O/P EST MOD 30 MIN: CPT | Performed by: NURSE PRACTITIONER

## 2019-03-22 RX ORDER — NITROGLYCERIN 0.4 MG/1
TABLET SUBLINGUAL
Qty: 100 TABLET | Refills: 11 | Status: SHIPPED | OUTPATIENT
Start: 2019-03-22 | End: 2020-10-30

## 2019-05-07 DIAGNOSIS — R55 SYNCOPE AND COLLAPSE: Primary | ICD-10-CM

## 2019-05-15 ENCOUNTER — TELEPHONE (OUTPATIENT)
Dept: CARDIOLOGY | Facility: CLINIC | Age: 69
End: 2019-05-15

## 2019-05-15 NOTE — TELEPHONE ENCOUNTER
Per Xiomara Pastrana, APRN- Monitor did not really show anything rare PACs PVCs and one limited run of SVT.  She is currently not on a beta-blocker although her monitors literally benign and no beta-blocker would be required we could put her on a low-dose metoprolol 12.5 mg twice daily if she is still symptomatic.      I attempted to reach the patient but work reported she was on vacation and could not leave a message on her home phone. Result letter sent and script sent to pharmacy.

## 2019-06-10 RX ORDER — ATORVASTATIN CALCIUM 20 MG/1
TABLET, FILM COATED ORAL
Qty: 30 TABLET | Refills: 3 | Status: SHIPPED | OUTPATIENT
Start: 2019-06-10 | End: 2020-10-30 | Stop reason: SDUPTHER

## 2019-07-22 ENCOUNTER — TRANSCRIBE ORDERS (OUTPATIENT)
Dept: ADMINISTRATIVE | Facility: HOSPITAL | Age: 69
End: 2019-07-22

## 2019-07-22 DIAGNOSIS — Z12.31 ENCOUNTER FOR SCREENING MAMMOGRAM FOR BREAST CANCER: Primary | ICD-10-CM

## 2019-08-09 ENCOUNTER — HOSPITAL ENCOUNTER (OUTPATIENT)
Dept: MAMMOGRAPHY | Facility: HOSPITAL | Age: 69
Discharge: HOME | End: 2019-08-09
Admitting: SURGERY

## 2019-08-09 DIAGNOSIS — Z12.31 ENCOUNTER FOR SCREENING MAMMOGRAM FOR BREAST CANCER: ICD-10-CM

## 2019-08-09 PROCEDURE — 77067 SCR MAMMO BI INCL CAD: CPT | Performed by: RADIOLOGY

## 2019-08-09 PROCEDURE — 77063 BREAST TOMOSYNTHESIS BI: CPT

## 2019-08-09 PROCEDURE — 77063 BREAST TOMOSYNTHESIS BI: CPT | Performed by: RADIOLOGY

## 2019-08-09 PROCEDURE — 77067 SCR MAMMO BI INCL CAD: CPT

## 2019-08-29 ENCOUNTER — OFFICE VISIT (OUTPATIENT)
Dept: SURGERY | Facility: CLINIC | Age: 69
End: 2019-08-29

## 2019-08-29 VITALS
SYSTOLIC BLOOD PRESSURE: 110 MMHG | WEIGHT: 178 LBS | HEIGHT: 68 IN | OXYGEN SATURATION: 98 % | RESPIRATION RATE: 17 BRPM | DIASTOLIC BLOOD PRESSURE: 61 MMHG | HEART RATE: 67 BPM | TEMPERATURE: 98.5 F | BODY MASS INDEX: 26.98 KG/M2

## 2019-08-29 DIAGNOSIS — R12 HEARTBURN: ICD-10-CM

## 2019-08-29 DIAGNOSIS — Z98.890 HISTORY OF NISSEN FUNDOPLICATION: ICD-10-CM

## 2019-08-29 DIAGNOSIS — R10.13 EPIGASTRIC PAIN: Primary | ICD-10-CM

## 2019-08-29 DIAGNOSIS — R47.02 DYSPHASIA: ICD-10-CM

## 2019-08-29 PROCEDURE — 99214 OFFICE O/P EST MOD 30 MIN: CPT | Performed by: SURGERY

## 2019-08-29 RX ORDER — CETIRIZINE HYDROCHLORIDE 10 MG/1
10 TABLET ORAL DAILY
COMMUNITY
End: 2021-01-11

## 2019-08-29 NOTE — PROGRESS NOTES
8/29/2019    Patient Information  Lyla Vazquez  [address] [facility] [place] [address]  1950  [phone] (home) [phone] (work)    Chief Complaint   Patient presents with   • Abdominal Pain     Abdominal Pain       HPI  Patient is a 69-year-old white female complaining of abdominal pain and severe heartburn.  Also nausea vomiting and blood in stool.  Patient had a colonoscopy in [place] 2 months ago because of blood in her stool and was told everything looked normal.    I saw this patient in 2014 because of upper GI problems.  I performed an EGD.  From reviewing my old chart it looks like this patient had a Heller myotomy performed for achalasia at  in 2001.  I am not able to find any of the actual old chart which I think we had at that time.  This information is gained from a note dictated July 31, 2014.  Her DeMeester score back then was below 14.  Her main complaint is heartburn now.  She ranks is 5 being, indicating symptoms are incapacitating and unable to do daily activities.  He also ranks swallowing as being equally difficult.    I am somewhat confused in reviewing old records whether she actually had a Nissen performed or a Heller myotomy.  The last EGD I listed that look like a disrupted Nissen       Past Medical History:   Diagnosis Date   • Anesthesia complication     HARD TO WAKE   • Arthritis    • Arthritis    • Gastric ulcer    • Hypertension    • Myocardial infarction (CMS/HCC)    • Stroke (CMS/HCC)    • Varicose veins    • Wears dentures    • Wears glasses        Past Surgical History:   Procedure Laterality Date   • CARDIAC CATHETERIZATION     • CARDIAC CATHETERIZATION N/A 12/17/2018    Procedure: Left Heart Cath;  Surgeon: Sandip Zamora IV, MD;  Location: [facility] INVASIVE LOCATION;  Service: Cardiovascular   • CHOLECYSTECTOMY      laparoscopic   • COLONOSCOPY  2014   • HYSTERECTOMY      benign   • OTHER SURGICAL HISTORY      leg repair   • STOMACH SURGERY         Patient  Active Problem List   Diagnosis   • Varicose veins   • Hematoma of left lower extremity   • Breast mass   • Family history of ovarian cancer   • Localized edema   • Essential hypertension   • Coronary artery disease involving native coronary artery of native heart with angina pectoris (CMS/HCC)   • Hyperlipidemia LDL goal <70   • Pre-syncope       Review of Systems    The Past medical history, family history, social history, medication list, allergies, and Review of Systems has been reviewed and confirmed.    General: negative  Integumentary: negative  Eyes: glasses  ENT: negative  Respiratory: negative  Gastrointestinal: nausea/vomiting, heartburn, blood in stool and abdominal pain  Cardiovascular: slow heart rate  Neurological: dizziness and faintness  Psychiatric: negative  Hematologic/Lymphatic: easy bruising  Genitourinary: negative  Musculoskeletal: painful joints, sore muscles, joint swelling and back pain,joint pain  Endocrine: negative  Breasts: negative      Current Outpatient Medications   Medication Sig Dispense Refill   • aspirin 81 MG EC tablet Take 1 tablet by mouth Daily. 30 tablet 5   • atorvastatin (LIPITOR) 20 MG tablet TAKE ONE TABLET BY MOUTH DAILY 30 tablet 3   • cetirizine (zyrTEC) 10 MG tablet Take 10 mg by mouth Daily.     • estrogens, conjugated, (PREMARIN) 0.625 MG tablet Take 1 tablet by mouth daily.     • gabapentin (NEURONTIN) 300 MG capsule Take 600 mg by mouth 3 (Three) Times a Day.     • HYDROcodone-acetaminophen (NORCO) 7.5-325 MG per tablet Take 1 tablet by mouth 2 (Two) Times a Day. Take 1 tablet every 4 to 6 hours as needed for pain     • lisinopril-hydrochlorothiazide (PRINZIDE,ZESTORETIC) 20-25 MG per tablet Take 1 tablet by mouth Daily. 30 tablet 5   • nitroglycerin (NITROSTAT) 0.4 MG SL tablet 1 under the tongue as needed for angina, may repeat q5mins for up three doses 100 tablet 11   • omeprazole (priLOSEC) 40 MG capsule Take 40 mg by mouth As Needed.     • metoprolol  "tartrate (LOPRESSOR) 25 MG tablet Take 0.5 tablets by mouth 2 (Two) Times a Day. 30 tablet 1     No current facility-administered medications for this visit.        Allergies  Sulfa antibiotics    /61   Pulse 67   Temp 98.5 °F (36.9 °C)   Resp 17   Ht 172.7 cm (68\")   Wt 80.7 kg (178 lb)   SpO2 98%   BMI 27.06 kg/m²      Physical Exam   Constitutional: She is oriented to person, place, and time. She appears well-developed and well-nourished. No distress.   HENT:   Head: Normocephalic.   Right Ear: External ear normal.   Left Ear: External ear normal.   Nose: Nose normal.   Mouth/Throat: Oropharynx is clear and moist.   Eyes: Conjunctivae and EOM are normal. Right eye exhibits no discharge. Left eye exhibits no discharge.   Neck: Normal range of motion. No JVD present. No tracheal deviation present. No thyromegaly present.   Cardiovascular: Normal rate, regular rhythm, normal heart sounds and intact distal pulses. Exam reveals no gallop and no friction rub.   No murmur heard.  Pulmonary/Chest: Effort normal and breath sounds normal. No stridor. No respiratory distress. She has no wheezes. She has no rales. She exhibits no tenderness.   Abdominal: Soft. Bowel sounds are normal. She exhibits no distension and no mass. There is no tenderness. There is no rebound and no guarding. No hernia.   Midline surgical scar   Genitourinary: Rectal exam shows guaiac negative stool.   Musculoskeletal: Normal range of motion. She exhibits no edema, tenderness or deformity.   Lymphadenopathy:     She has no cervical adenopathy.   Neurological: She is alert and oriented to person, place, and time. She has normal reflexes. She displays normal reflexes. No cranial nerve deficit. She exhibits normal muscle tone. Coordination normal.   Skin: Skin is warm and dry. No rash noted. She is not diaphoretic. No erythema. No pallor.   Psychiatric: She has a normal mood and affect. Her behavior is normal. Judgment and thought content " normal.               ASSESSMENT   severe heartburn and dysphasia  History of Nissen fundoplication versus Heller myotomy  PLAN    EGD and pH study    Discussion Summary  Patient's Body mass index is 27.06 kg/m². BMI is above normal parameters. Recommendations include: educational material.                                   This document signed by Neil Balderrama MD August 29, 2019 10:37 AM

## 2019-08-30 PROBLEM — Z98.890 HISTORY OF NISSEN FUNDOPLICATION: Status: ACTIVE | Noted: 2019-08-30

## 2019-08-30 PROBLEM — R12 HEARTBURN: Status: ACTIVE | Noted: 2019-08-30

## 2019-08-30 PROBLEM — R47.02 DYSPHASIA: Status: ACTIVE | Noted: 2019-08-30

## 2019-08-30 PROBLEM — R10.13 EPIGASTRIC PAIN: Status: ACTIVE | Noted: 2019-08-30

## 2019-09-12 ENCOUNTER — TELEPHONE (OUTPATIENT)
Dept: CARDIOLOGY | Facility: CLINIC | Age: 69
End: 2019-09-12

## 2019-09-12 NOTE — TELEPHONE ENCOUNTER
Dr. Balderrama's office is requesting cardiac clearance for a PH study and EGD on Monday. OK to clear? She is on ASA 81 mg daily.    [facility] 12/17/18- Mild nonobstructive CAD. Normal EF.

## 2019-09-13 ENCOUNTER — TELEPHONE (OUTPATIENT)
Dept: SURGERY | Facility: CLINIC | Age: 69
End: 2019-09-13

## 2019-09-13 NOTE — TELEPHONE ENCOUNTER
I SPOKE WITH PATIENT WITH A NEW TIME FOR HER SURGERY. IT WILL BE SEPT 16 @ 8:30. WE'VE HAD TO CHANGE THE SCHEDULE AROUND DUE TO SOMEONE BEING SICK AND GETTING DROPPED OFF THE SURGERY LIST. I EXPLAINED IT TO THE PATIENT. SHE SAID SHE UNDERSTANDS.

## 2019-09-13 NOTE — TELEPHONE ENCOUNTER
I CALLED PATIENT BUT DIDN'T GET AN ANSWER. SHE RETURNED MY CALL AND I TOLD HER TO BE AT THE [facility] AT 10AM ON SEPT 16 FOR SURGERY.

## 2019-09-13 NOTE — TELEPHONE ENCOUNTER
I CALLED PATIENT WITH A NEW TIME FOR HER SURGERY ON SEPT 16. I TOLD HER TO BE AT THE HOSPITAL AT 7:30 INSTEAD OF 10:00.

## 2019-09-16 ENCOUNTER — HOSPITAL ENCOUNTER (OUTPATIENT)
Facility: HOSPITAL | Age: 69
Setting detail: HOSPITAL OUTPATIENT SURGERY
Discharge: HOME | End: 2019-09-16
Attending: SURGERY | Admitting: SURGERY

## 2019-09-16 ENCOUNTER — ANESTHESIA EVENT (OUTPATIENT)
Dept: PERIOP | Facility: HOSPITAL | Age: 69
End: 2019-09-16

## 2019-09-16 ENCOUNTER — ANESTHESIA (OUTPATIENT)
Dept: PERIOP | Facility: HOSPITAL | Age: 69
End: 2019-09-16

## 2019-09-16 VITALS
TEMPERATURE: 98 F | BODY MASS INDEX: 27.13 KG/M2 | WEIGHT: 179 LBS | HEIGHT: 68 IN | SYSTOLIC BLOOD PRESSURE: 116 MMHG | HEART RATE: 64 BPM | OXYGEN SATURATION: 99 % | DIASTOLIC BLOOD PRESSURE: 57 MMHG | RESPIRATION RATE: 16 BRPM

## 2019-09-16 DIAGNOSIS — R12 HEARTBURN: ICD-10-CM

## 2019-09-16 DIAGNOSIS — R10.13 EPIGASTRIC PAIN: ICD-10-CM

## 2019-09-16 DIAGNOSIS — Z98.890 HISTORY OF NISSEN FUNDOPLICATION: ICD-10-CM

## 2019-09-16 DIAGNOSIS — R47.02 DYSPHASIA: ICD-10-CM

## 2019-09-16 PROCEDURE — 25010000002 FENTANYL CITRATE (PF) 100 MCG/2ML SOLUTION: Performed by: NURSE ANESTHETIST, CERTIFIED REGISTERED

## 2019-09-16 PROCEDURE — 43239 EGD BIOPSY SINGLE/MULTIPLE: CPT | Performed by: SURGERY

## 2019-09-16 PROCEDURE — 25010000002 PROPOFOL 10 MG/ML EMULSION: Performed by: NURSE ANESTHETIST, CERTIFIED REGISTERED

## 2019-09-16 RX ORDER — PROPOFOL 10 MG/ML
VIAL (ML) INTRAVENOUS AS NEEDED
Status: DISCONTINUED | OUTPATIENT
Start: 2019-09-16 | End: 2019-09-16 | Stop reason: SURG

## 2019-09-16 RX ORDER — IPRATROPIUM BROMIDE AND ALBUTEROL SULFATE 2.5; .5 MG/3ML; MG/3ML
3 SOLUTION RESPIRATORY (INHALATION) ONCE AS NEEDED
Status: DISCONTINUED | OUTPATIENT
Start: 2019-09-16 | End: 2019-09-16 | Stop reason: HOSPADM

## 2019-09-16 RX ORDER — SODIUM CHLORIDE 0.9 % (FLUSH) 0.9 %
3 SYRINGE (ML) INJECTION EVERY 12 HOURS SCHEDULED
Status: DISCONTINUED | OUTPATIENT
Start: 2019-09-16 | End: 2019-09-16 | Stop reason: HOSPADM

## 2019-09-16 RX ORDER — MAGNESIUM HYDROXIDE 1200 MG/15ML
LIQUID ORAL AS NEEDED
Status: DISCONTINUED | OUTPATIENT
Start: 2019-09-16 | End: 2019-09-16 | Stop reason: HOSPADM

## 2019-09-16 RX ORDER — MEPERIDINE HYDROCHLORIDE 25 MG/ML
12.5 INJECTION INTRAMUSCULAR; INTRAVENOUS; SUBCUTANEOUS
Status: DISCONTINUED | OUTPATIENT
Start: 2019-09-16 | End: 2019-09-16 | Stop reason: HOSPADM

## 2019-09-16 RX ORDER — OXYCODONE HYDROCHLORIDE AND ACETAMINOPHEN 5; 325 MG/1; MG/1
1 TABLET ORAL ONCE AS NEEDED
Status: DISCONTINUED | OUTPATIENT
Start: 2019-09-16 | End: 2019-09-16 | Stop reason: HOSPADM

## 2019-09-16 RX ORDER — LIDOCAINE HYDROCHLORIDE 20 MG/ML
INJECTION, SOLUTION INFILTRATION; PERINEURAL AS NEEDED
Status: DISCONTINUED | OUTPATIENT
Start: 2019-09-16 | End: 2019-09-16 | Stop reason: SURG

## 2019-09-16 RX ORDER — SODIUM CHLORIDE 0.9 % (FLUSH) 0.9 %
3-10 SYRINGE (ML) INJECTION AS NEEDED
Status: DISCONTINUED | OUTPATIENT
Start: 2019-09-16 | End: 2019-09-16 | Stop reason: HOSPADM

## 2019-09-16 RX ORDER — ONDANSETRON 2 MG/ML
4 INJECTION INTRAMUSCULAR; INTRAVENOUS AS NEEDED
Status: DISCONTINUED | OUTPATIENT
Start: 2019-09-16 | End: 2019-09-16 | Stop reason: HOSPADM

## 2019-09-16 RX ORDER — FENTANYL CITRATE 50 UG/ML
50 INJECTION, SOLUTION INTRAMUSCULAR; INTRAVENOUS
Status: DISCONTINUED | OUTPATIENT
Start: 2019-09-16 | End: 2019-09-16 | Stop reason: HOSPADM

## 2019-09-16 RX ORDER — SODIUM CHLORIDE, SODIUM LACTATE, POTASSIUM CHLORIDE, CALCIUM CHLORIDE 600; 310; 30; 20 MG/100ML; MG/100ML; MG/100ML; MG/100ML
125 INJECTION, SOLUTION INTRAVENOUS CONTINUOUS
Status: DISCONTINUED | OUTPATIENT
Start: 2019-09-16 | End: 2019-09-16 | Stop reason: HOSPADM

## 2019-09-16 RX ORDER — FENTANYL CITRATE 50 UG/ML
INJECTION, SOLUTION INTRAMUSCULAR; INTRAVENOUS AS NEEDED
Status: DISCONTINUED | OUTPATIENT
Start: 2019-09-16 | End: 2019-09-16 | Stop reason: SURG

## 2019-09-16 RX ADMIN — LIDOCAINE HYDROCHLORIDE 40 MG: 20 INJECTION, SOLUTION INFILTRATION; PERINEURAL at 10:36

## 2019-09-16 RX ADMIN — PROPOFOL 50 MG: 10 INJECTION, EMULSION INTRAVENOUS at 10:39

## 2019-09-16 RX ADMIN — SODIUM CHLORIDE, POTASSIUM CHLORIDE, SODIUM LACTATE AND CALCIUM CHLORIDE 125 ML/HR: 600; 310; 30; 20 INJECTION, SOLUTION INTRAVENOUS at 09:46

## 2019-09-16 RX ADMIN — EPHEDRINE SULFATE 10 MG: 50 INJECTION INTRAMUSCULAR; INTRAVENOUS; SUBCUTANEOUS at 10:48

## 2019-09-16 RX ADMIN — FENTANYL CITRATE 100 MCG: 50 INJECTION INTRAMUSCULAR; INTRAVENOUS at 10:36

## 2019-09-16 RX ADMIN — PROPOFOL 160 MCG/KG/MIN: 10 INJECTION, EMULSION INTRAVENOUS at 10:39

## 2019-09-16 NOTE — ANESTHESIA PREPROCEDURE EVALUATION
Anesthesia Evaluation     Patient summary reviewed and Nursing notes reviewed   history of anesthetic complications: prolonged sedation  NPO Solid Status: > 8 hours  NPO Liquid Status: > 8 hours           Airway   Mallampati: II  TM distance: >3 FB  Neck ROM: full  no difficulty expected  Dental - normal exam     Pulmonary - negative pulmonary ROS and normal exam   (-) asthma, not a smoker  Cardiovascular - normal exam  Exercise tolerance: good (4-7 METS)    NYHA Classification: II  Patient on routine beta blocker and Beta blocker given within 24 hours of surgery    (+) hypertension, past MI  6-12 months, CAD, dysrhythmias Bradycardia, angina, hyperlipidemia,       Neuro/Psych  (+) TIA, numbness,     (-) CVA  GI/Hepatic/Renal/Endo    (+)  GERD, PUD,    (-) liver disease, no renal disease, diabetes, hypothyroidism    Musculoskeletal     (+) back pain,   Abdominal  - normal exam    Bowel sounds: normal.   Substance History - negative use     OB/GYN negative ob/gyn ROS         Other   (+) arthritis                     Anesthesia Plan    ASA 3     general     intravenous induction   Anesthetic plan, all risks, benefits, and alternatives have been provided, discussed and informed consent has been obtained with: patient.  Use of blood products discussed with patient  Consented to blood products.

## 2019-09-16 NOTE — OP NOTE
Lyla Vazquez  9/16/2019      Operative Progress Note:    Surgeon and Assistant: Dr. Balderrama    Pre-Operative Diagnosis: Dysphasia, heartburn, history of achalasia, status post Heller myotomy with fundoplication    Post-Operative Diagnosis: Same    Procedure(s): EGD with biopsies and placement of pH probe    Type of Anesthesia Administered: IV general    Estimated Blood Loss: Minimal    Blood Products: None    Specimen Obtained/Removed: Duodenum, antrum, esophageal biopsies    Complication(s):  None    Graft/Implant/Prosthetics/Implanted Device/Transplants:  None    Indication: Patient is a 69-year-old white female    Findings: Duodenum normal, stomach with distal mild inflammation, evidence of a partial wrap on retroflexed view.  Esophagus normal.  Gastroesophageal junction 40 cm    Operative Report:  Patient was taken operating room and placed in a left lateral decubitus position.  IV sedation was given per anesthesia.  Gastroscope was introduced and passed into the duodenum.  The scope was slowly withdrawn.  I examined the duodenum, stomach and esophagus thoroughly.  Biopsies were taken as indicated above.  Findings are listed as above.  pH probe was introduced and deployed at 34 cm.  Scope confirms displacement.    Patient will be discharged home at recovery and will be seen back in the office in 1 week.      Electronically Signed by: Neil Balderrama MD        Dictated Utilizing Dragon Dictation

## 2019-09-16 NOTE — ANESTHESIA POSTPROCEDURE EVALUATION
Patient: Lyla aVzquez    Procedure Summary     Date:  09/16/19 Room / Location:  [facility] OR  /  [facility] OR    Anesthesia Start:  1036 Anesthesia Stop:  1059    Procedure:  ESOPHAGOGASTRODUODENOSCOPY AND BRAVO (N/A Esophagus) Diagnosis:       Epigastric pain      Heartburn      Dysphasia      History of Nissen fundoplication      (Epigastric pain [R10.13])      (Heartburn [R12])      (Dysphasia [R47.02])      (History of Nissen fundoplication [Z98.890])    Surgeon:  Neil Balderrama MD Provider:  Barry Ortiz MD    Anesthesia Type:  general ASA Status:  3          Anesthesia Type: general  Last vitals  BP   106/57 (09/16/19 1105)   Temp   98 °F (36.7 °C) (09/16/19 1100)   Pulse   64 (09/16/19 1110)   Resp   12 (09/16/19 1110)     SpO2   100 % (09/16/19 1110)     Post Anesthesia Care and Evaluation    Patient location during evaluation: PHASE II  Patient participation: complete - patient participated  Level of consciousness: awake and alert  Pain score: 1  Pain management: adequate  Airway patency: patent  Anesthetic complications: No anesthetic complications  PONV Status: controlled  Cardiovascular status: acceptable  Respiratory status: acceptable  Hydration status: acceptable

## 2019-09-17 LAB
LAB AP CASE REPORT: NORMAL
PATH REPORT.FINAL DX SPEC: NORMAL

## 2019-09-24 ENCOUNTER — OFFICE VISIT (OUTPATIENT)
Dept: SURGERY | Facility: CLINIC | Age: 69
End: 2019-09-24

## 2019-09-24 VITALS
TEMPERATURE: 98.3 F | SYSTOLIC BLOOD PRESSURE: 112 MMHG | DIASTOLIC BLOOD PRESSURE: 62 MMHG | RESPIRATION RATE: 17 BRPM | HEIGHT: 68 IN | BODY MASS INDEX: 27.28 KG/M2 | HEART RATE: 67 BPM | OXYGEN SATURATION: 98 % | WEIGHT: 180 LBS

## 2019-09-24 DIAGNOSIS — Z98.890 HISTORY OF NISSEN FUNDOPLICATION: ICD-10-CM

## 2019-09-24 DIAGNOSIS — K22.0 ACHALASIA: ICD-10-CM

## 2019-09-24 DIAGNOSIS — R47.02 DYSPHASIA: Primary | ICD-10-CM

## 2019-09-24 PROCEDURE — 99213 OFFICE O/P EST LOW 20 MIN: CPT | Performed by: SURGERY

## 2019-09-24 NOTE — PROGRESS NOTES
9/24/2019    Patient Information  Lyla Vazquez  [address] [facility] [place] [address]  1950  [phone] (home) [phone] (work)    Chief Complaint   Patient presents with   • Follow-up     FU EGD/BRAVO       HPI  She is a 69-year-old white female with a history of achalasia, status post Heller myotomy with fundoplication at  a few years ago.  Her main problem is dysphasia and heartburn.  Her recent EGD EGD showed evidence of the fundoplication otherwise was essentially unremarkable.  DeMeester score was very low indicating no significant reflux.    Past Medical History:   Diagnosis Date   • Anemia    • Anesthesia complication     HARD TO WAKE   • Arthritis    • Arthritis    • Chronic bronchitis (CMS/HCC)    • Coronary artery disease    • Elevated cholesterol    • Gastric ulcer    • GERD (gastroesophageal reflux disease)    • History of transfusion    • Hypertension    • Myocardial infarction (CMS/HCC)    • Numbness    • Stroke (CMS/HCC)     TIA   • Varicose veins    • Wears dentures    • Wears glasses        Past Surgical History:   Procedure Laterality Date   • ABDOMINAL SURGERY     • BRAVO PROCEDURE N/A 9/16/2019    Procedure: ESOPHAGOGASTRODUODENOSCOPY AND FRIEDMAN;  Surgeon: Neil Balderrama MD;  Location: [facility] OR;  Service: Gastroenterology   • CARDIAC CATHETERIZATION     • CARDIAC CATHETERIZATION N/A 12/17/2018    Procedure: Left Heart Cath;  Surgeon: Sandip Zamora IV, MD;  [facility];  Service: Cardiovascular   • CHOLECYSTECTOMY      laparoscopic   • COLONOSCOPY  2014   • ENDOSCOPY     • FRACTURE SURGERY     • HYSTERECTOMY      benign   • OTHER SURGICAL HISTORY      leg repair   • STOMACH SURGERY         Patient Active Problem List   Diagnosis   • Varicose veins   • Hematoma of left lower extremity   • Breast mass   • Family history of ovarian cancer   • Localized edema   • Essential hypertension   • Coronary artery disease involving native coronary artery of  native heart with angina pectoris (CMS/[facility])   • Hyperlipidemia LDL goal <70   • Pre-syncope   • Epigastric pain   • Heartburn   • Dysphasia   • History of Nissen fundoplication       Review of Systems    The Past medical history, family history, social history, medication list, allergies, and Review of Systems has been reviewed and confirmed.    General: negative  Integumentary: negative  Eyes: glasses  ENT: negative  Respiratory: negative  Gastrointestinal: nausea/vomiting, heartburn, blood in stool and abdominal pain  Cardiovascular: slow heart rate  Neurological: dizziness and faintness  Psychiatric: negative  Hematologic/Lymphatic: easy bruising  Genitourinary: negative  Musculoskeletal: painful joints, sore muscles, joint swelling, back pain and joint stiffness  Endocrine: negative  Breasts: negative      Current Outpatient Medications   Medication Sig Dispense Refill   • aspirin 81 MG EC tablet Take 1 tablet by mouth Daily. 30 tablet 5   • atorvastatin (LIPITOR) 20 MG tablet TAKE ONE TABLET BY MOUTH DAILY 30 tablet 3   • cetirizine (zyrTEC) 10 MG tablet Take 10 mg by mouth Daily.     • estrogens, conjugated, (PREMARIN) 0.625 MG tablet Take 1 tablet by mouth daily.     • gabapentin (NEURONTIN) 300 MG capsule Take 600 mg by mouth 3 (Three) Times a Day.     • HYDROcodone-acetaminophen (NORCO) 7.5-325 MG per tablet Take 1 tablet by mouth 2 (Two) Times a Day. Take 1 tablet every 4 to 6 hours as needed for pain     • lisinopril-hydrochlorothiazide (PRINZIDE,ZESTORETIC) 20-25 MG per tablet Take 1 tablet by mouth Daily. 30 tablet 5   • metoprolol tartrate (LOPRESSOR) 25 MG tablet Take 0.5 tablets by mouth 2 (Two) Times a Day. 30 tablet 1   • nitroglycerin (NITROSTAT) 0.4 MG SL tablet 1 under the tongue as needed for angina, may repeat q5mins for up three doses 100 tablet 11   • omeprazole (priLOSEC) 40 MG capsule Take 40 mg by mouth As Needed.       No current facility-administered medications for this visit.   "      Allergies  Sulfa antibiotics    /62   Pulse 67   Temp 98.3 °F (36.8 °C)   Resp 17   Ht 172.7 cm (68\")   Wt 81.6 kg (180 lb)   SpO2 98%   BMI 27.37 kg/m²      Physical Exam          ASSESSMENT  Dysphasia with a history of achalasia    PLAN    Swallow and upper GI.    Discussion Summary  Patient's Body mass index is 27.37 kg/m². BMI is within normal parameters. No follow-up required..                                   This document signed by Neil Balderrama MD September 24, 2019 9:35 AM   "

## 2019-10-02 ENCOUNTER — HOSPITAL ENCOUNTER (OUTPATIENT)
Dept: GENERAL RADIOLOGY | Facility: HOSPITAL | Age: 69
End: 2019-10-02

## 2019-10-02 ENCOUNTER — HOSPITAL ENCOUNTER (OUTPATIENT)
Dept: GENERAL RADIOLOGY | Facility: HOSPITAL | Age: 69
Discharge: HOME | End: 2019-10-02
Admitting: SURGERY

## 2019-10-02 DIAGNOSIS — K22.0 ACHALASIA: ICD-10-CM

## 2019-10-02 DIAGNOSIS — R47.02 DYSPHASIA: ICD-10-CM

## 2019-10-02 DIAGNOSIS — Z98.890 HISTORY OF NISSEN FUNDOPLICATION: ICD-10-CM

## 2019-10-02 PROCEDURE — 74246 X-RAY XM UPR GI TRC 2CNTRST: CPT | Performed by: RADIOLOGY

## 2019-10-02 PROCEDURE — 74246 X-RAY XM UPR GI TRC 2CNTRST: CPT

## 2019-10-08 ENCOUNTER — OFFICE VISIT (OUTPATIENT)
Dept: SURGERY | Facility: CLINIC | Age: 69
End: 2019-10-08

## 2019-10-08 VITALS
SYSTOLIC BLOOD PRESSURE: 115 MMHG | OXYGEN SATURATION: 98 % | TEMPERATURE: 98 F | WEIGHT: 176.6 LBS | RESPIRATION RATE: 17 BRPM | BODY MASS INDEX: 26.76 KG/M2 | HEIGHT: 68 IN | HEART RATE: 68 BPM | DIASTOLIC BLOOD PRESSURE: 63 MMHG

## 2019-10-08 DIAGNOSIS — K22.0 ACHALASIA: ICD-10-CM

## 2019-10-08 DIAGNOSIS — R47.02 DYSPHASIA: Primary | ICD-10-CM

## 2019-10-08 DIAGNOSIS — Z98.890 HISTORY OF NISSEN FUNDOPLICATION: ICD-10-CM

## 2019-10-08 PROCEDURE — 99214 OFFICE O/P EST MOD 30 MIN: CPT | Performed by: SURGERY

## 2019-10-08 NOTE — PROGRESS NOTES
10/8/2019    Patient Information  Lyla Vazquez  [address] [facility] [place] [address]  1950  [phone] (home) [phone] (work)    Chief Complaint  Chief Complaint   Patient presents with   • Follow-up     FU Barium Swallow/UGI       HPI  Patient is a 69-year-old white female with a diagnosis of achalasia and is status post Heller myotomy and Nissen fundoplication at [facility] many years ago.  She is always had some persistent dysphasia but is gotten worse.  She had a recent barium swallow which showed that the barium tablet would not pass.    Past Medical History  Past Medical History:   Diagnosis Date   • Anemia    • Anesthesia complication     HARD TO WAKE   • Arthritis    • Arthritis    • Chronic bronchitis (CMS/HCC)    • Coronary artery disease    • Elevated cholesterol    • Gastric ulcer    • GERD (gastroesophageal reflux disease)    • History of transfusion    • Hypertension    • Myocardial infarction (CMS/HCC)    • Numbness    • Stroke (CMS/HCC)     TIA   • Varicose veins    • Wears dentures    • Wears glasses        Past Surgical History  Past Surgical History:   Procedure Laterality Date   • ABDOMINAL SURGERY     • BRAVO PROCEDURE N/A 9/16/2019    Procedure: ESOPHAGOGASTRODUODENOSCOPY AND FRIEDMAN;  Surgeon: Neil Balderrama MD;  Location: [facility] OR;  Service: Gastroenterology   • CARDIAC CATHETERIZATION     • CARDIAC CATHETERIZATION N/A 12/17/2018    Procedure: Left Heart Cath;  Surgeon: Sandip Zamora IV, MD;  Location: [facility] CATH INVASIVE LOCATION;  Service: Cardiovascular   • CHOLECYSTECTOMY      laparoscopic   • COLONOSCOPY  2014   • ENDOSCOPY     • FRACTURE SURGERY     • HYSTERECTOMY      benign   • OTHER SURGICAL HISTORY      leg repair   • STOMACH SURGERY         Current Medications  Current Outpatient Medications   Medication Sig Dispense Refill   • aspirin 81 MG EC tablet Take 1 tablet by mouth Daily. 30 tablet 5   • atorvastatin (LIPITOR) 20 MG tablet TAKE ONE TABLET BY  MOUTH DAILY 30 tablet 3   • cetirizine (zyrTEC) 10 MG tablet Take 10 mg by mouth Daily.     • estrogens, conjugated, (PREMARIN) 0.625 MG tablet Take 1 tablet by mouth daily.     • gabapentin (NEURONTIN) 300 MG capsule Take 600 mg by mouth 3 (Three) Times a Day.     • HYDROcodone-acetaminophen (NORCO) 7.5-325 MG per tablet Take 1 tablet by mouth 2 (Two) Times a Day. Take 1 tablet every 4 to 6 hours as needed for pain     • lisinopril-hydrochlorothiazide (PRINZIDE,ZESTORETIC) 20-25 MG per tablet Take 1 tablet by mouth Daily. 30 tablet 5   • metoprolol tartrate (LOPRESSOR) 25 MG tablet Take 0.5 tablets by mouth 2 (Two) Times a Day. 30 tablet 1   • nitroglycerin (NITROSTAT) 0.4 MG SL tablet 1 under the tongue as needed for angina, may repeat q5mins for up three doses 100 tablet 11   • omeprazole (priLOSEC) 40 MG capsule Take 40 mg by mouth As Needed.       No current facility-administered medications for this visit.        Allergies  Sulfa antibiotics      Review of Systems    The Past medical history, family history, social history, medication list, allergies, and Review of Systems has been reviewed and confirmed.    General: negative  Integumentary: negative  Eyes: glasses  ENT: negative  Respiratory: negative  Gastrointestinal: nausea/vomiting, heartburn, blood in stool and abdominal pain  Cardiovascular: slow heart rate  Neurological: dizziness and faintness  Psychiatric: negative  Hematologic/Lymphatic: easy bruising  Genitourinary: negative  Musculoskeletal: painful joints, sore muscles, joint swelling, back pain and joint stiffness  Endocrine: negative  Breasts: negative    Physical Exam   Vitals:   Vitals:    10/08/19 0922   BP: 115/63   Pulse: 68   Resp: 17   Temp: 98 °F (36.7 °C)   SpO2: 98%       Physical Exam   Constitutional: She is oriented to person, place, and time. She appears well-developed and well-nourished. No distress.   HENT:   Head: Normocephalic.   Right Ear: External ear normal.   Left Ear:  External ear normal.   Nose: Nose normal.   Mouth/Throat: Oropharynx is clear and moist.   Eyes: Conjunctivae and EOM are normal. Right eye exhibits no discharge. Left eye exhibits no discharge.   Neck: Normal range of motion. No JVD present. No tracheal deviation present. No thyromegaly present.   Cardiovascular: Normal rate, regular rhythm, normal heart sounds and intact distal pulses. Exam reveals no gallop and no friction rub.   No murmur heard.  Pulmonary/Chest: Effort normal and breath sounds normal. No stridor. No respiratory distress. She has no wheezes. She has no rales. She exhibits no tenderness.   Abdominal: Soft. Bowel sounds are normal. She exhibits no distension and no mass. There is no tenderness. There is no rebound and no guarding. No hernia.   Genitourinary: Rectal exam shows guaiac negative stool.   Musculoskeletal: Normal range of motion. She exhibits no edema, tenderness or deformity.   Lymphadenopathy:     She has no cervical adenopathy.   Neurological: She is alert and oriented to person, place, and time. She has normal reflexes. She displays normal reflexes. No cranial nerve deficit. She exhibits normal muscle tone. Coordination normal.   Skin: Skin is warm and dry. No rash noted. She is not diaphoretic. No erythema. No pallor.   Psychiatric: She has a normal mood and affect. Her behavior is normal. Judgment and thought content normal.       Assessment   History of achalasia  History of Nissen fundoplication  History of Heller myotomy  Dysphasia    Plan  EGD with esophageal dilatation        This document signed by Neil Balderrama MD October 8, 2019 9:35 AM

## 2019-11-08 PROBLEM — K22.0 ACHALASIA: Status: ACTIVE | Noted: 2019-11-08

## 2019-11-11 ENCOUNTER — HOSPITAL ENCOUNTER (OUTPATIENT)
Facility: HOSPITAL | Age: 69
Setting detail: HOSPITAL OUTPATIENT SURGERY
Discharge: HOME | End: 2019-11-11
Attending: SURGERY | Admitting: SURGERY

## 2019-11-11 ENCOUNTER — ANESTHESIA EVENT (OUTPATIENT)
Dept: PERIOP | Facility: HOSPITAL | Age: 69
End: 2019-11-11

## 2019-11-11 ENCOUNTER — APPOINTMENT (OUTPATIENT)
Dept: GENERAL RADIOLOGY | Facility: HOSPITAL | Age: 69
End: 2019-11-11

## 2019-11-11 ENCOUNTER — ANESTHESIA (OUTPATIENT)
Dept: PERIOP | Facility: HOSPITAL | Age: 69
End: 2019-11-11

## 2019-11-11 VITALS
TEMPERATURE: 97.2 F | OXYGEN SATURATION: 99 % | RESPIRATION RATE: 18 BRPM | BODY MASS INDEX: 27.13 KG/M2 | HEIGHT: 68 IN | WEIGHT: 179 LBS | SYSTOLIC BLOOD PRESSURE: 127 MMHG | HEART RATE: 55 BPM | DIASTOLIC BLOOD PRESSURE: 72 MMHG

## 2019-11-11 PROCEDURE — 76000 FLUOROSCOPY <1 HR PHYS/QHP: CPT | Performed by: RADIOLOGY

## 2019-11-11 PROCEDURE — 25010000002 PROPOFOL 10 MG/ML EMULSION: Performed by: NURSE ANESTHETIST, CERTIFIED REGISTERED

## 2019-11-11 PROCEDURE — 74360 X-RAY GUIDE GI DILATION: CPT | Performed by: SURGERY

## 2019-11-11 PROCEDURE — 25010000002 MIDAZOLAM PER 1 MG: Performed by: NURSE ANESTHETIST, CERTIFIED REGISTERED

## 2019-11-11 PROCEDURE — 76000 FLUOROSCOPY <1 HR PHYS/QHP: CPT

## 2019-11-11 PROCEDURE — 43248 EGD GUIDE WIRE INSERTION: CPT | Performed by: SURGERY

## 2019-11-11 RX ORDER — OXYCODONE HYDROCHLORIDE AND ACETAMINOPHEN 5; 325 MG/1; MG/1
1 TABLET ORAL ONCE AS NEEDED
Status: CANCELLED | OUTPATIENT
Start: 2019-11-11

## 2019-11-11 RX ORDER — IPRATROPIUM BROMIDE AND ALBUTEROL SULFATE 2.5; .5 MG/3ML; MG/3ML
3 SOLUTION RESPIRATORY (INHALATION) ONCE AS NEEDED
Status: CANCELLED | OUTPATIENT
Start: 2019-11-11

## 2019-11-11 RX ORDER — LIDOCAINE HYDROCHLORIDE 20 MG/ML
INJECTION, SOLUTION INFILTRATION; PERINEURAL AS NEEDED
Status: DISCONTINUED | OUTPATIENT
Start: 2019-11-11 | End: 2019-11-11 | Stop reason: SURG

## 2019-11-11 RX ORDER — SODIUM CHLORIDE, SODIUM LACTATE, POTASSIUM CHLORIDE, CALCIUM CHLORIDE 600; 310; 30; 20 MG/100ML; MG/100ML; MG/100ML; MG/100ML
125 INJECTION, SOLUTION INTRAVENOUS CONTINUOUS
Status: DISCONTINUED | OUTPATIENT
Start: 2019-11-11 | End: 2019-11-11 | Stop reason: HOSPADM

## 2019-11-11 RX ORDER — SODIUM CHLORIDE 0.9 % (FLUSH) 0.9 %
3 SYRINGE (ML) INJECTION EVERY 12 HOURS SCHEDULED
Status: DISCONTINUED | OUTPATIENT
Start: 2019-11-11 | End: 2019-11-11 | Stop reason: HOSPADM

## 2019-11-11 RX ORDER — MIDAZOLAM HYDROCHLORIDE 1 MG/ML
INJECTION INTRAMUSCULAR; INTRAVENOUS AS NEEDED
Status: DISCONTINUED | OUTPATIENT
Start: 2019-11-11 | End: 2019-11-11 | Stop reason: SURG

## 2019-11-11 RX ORDER — PROPOFOL 10 MG/ML
VIAL (ML) INTRAVENOUS AS NEEDED
Status: DISCONTINUED | OUTPATIENT
Start: 2019-11-11 | End: 2019-11-11 | Stop reason: SURG

## 2019-11-11 RX ORDER — MEPERIDINE HYDROCHLORIDE 25 MG/ML
12.5 INJECTION INTRAMUSCULAR; INTRAVENOUS; SUBCUTANEOUS
Status: CANCELLED | OUTPATIENT
Start: 2019-11-11 | End: 2019-11-12

## 2019-11-11 RX ORDER — FENTANYL CITRATE 50 UG/ML
50 INJECTION, SOLUTION INTRAMUSCULAR; INTRAVENOUS
Status: CANCELLED | OUTPATIENT
Start: 2019-11-11

## 2019-11-11 RX ORDER — ONDANSETRON 2 MG/ML
4 INJECTION INTRAMUSCULAR; INTRAVENOUS AS NEEDED
Status: CANCELLED | OUTPATIENT
Start: 2019-11-11

## 2019-11-11 RX ORDER — SODIUM CHLORIDE 0.9 % (FLUSH) 0.9 %
3-10 SYRINGE (ML) INJECTION AS NEEDED
Status: DISCONTINUED | OUTPATIENT
Start: 2019-11-11 | End: 2019-11-11 | Stop reason: HOSPADM

## 2019-11-11 RX ADMIN — PROPOFOL 50 MG: 10 INJECTION, EMULSION INTRAVENOUS at 12:25

## 2019-11-11 RX ADMIN — PROPOFOL 50 MG: 10 INJECTION, EMULSION INTRAVENOUS at 12:15

## 2019-11-11 RX ADMIN — MIDAZOLAM HYDROCHLORIDE 2 MG: 1 INJECTION, SOLUTION INTRAMUSCULAR; INTRAVENOUS at 12:13

## 2019-11-11 RX ADMIN — PROPOFOL 50 MG: 10 INJECTION, EMULSION INTRAVENOUS at 12:20

## 2019-11-11 RX ADMIN — SODIUM CHLORIDE, POTASSIUM CHLORIDE, SODIUM LACTATE AND CALCIUM CHLORIDE: 600; 310; 30; 20 INJECTION, SOLUTION INTRAVENOUS at 12:13

## 2019-11-11 RX ADMIN — LIDOCAINE HYDROCHLORIDE 100 MG: 20 INJECTION, SOLUTION INFILTRATION; PERINEURAL at 12:15

## 2019-11-11 NOTE — OP NOTE
Lyla Vazquez  11/11/2019      Operative Progress Note:    Surgeon and Assistant: Dr. Balderrama    Pre-Operative Diagnosis: Achalasia, dysphasia, history of Heller myotomy with Nissen fundoplication    Post-Operative Diagnosis: Achalasia, dysphasia, history of Heller myotomy with Nissen fundoplication    Procedure(s): EGD with esophageal dilatation to 51 Citizen of [place] dilator with fluoroscopic guidance    Type of Anesthesia Administered: IV general    Estimated Blood Loss: Minimal    Blood Products: None    Specimen Obtained/Removed:  None    Complication(s):  None    Graft/Implant/Prosthetics/Implanted Device/Transplants:  None    Indication: Patient is 69-year-old white female with the above diagnosis.  Her Heller myotomy and Nissen fundoplication were many years ago at [facility]    Findings: Esophagus normal, findings consistent with a Nissen fundoplication.    Operative Report:  Patient was taken operating room and placed in a left lateral decubitus position.  IV sedation was given per anesthesia.  Gastroscope was introduced and passed into the duodenum.  Guidewire was passed down to the stomach by way of the endoscope.  Using fluoroscopic guidance I first used a 48 Citizen of [place] dilator followed by a 51 Citizen of [place] dilator.  I passed the scope and there was a small amount of blood with small mucosal injury but nothing significant.  The scope was slowly withdrawn.  I examined the duodenum, stomach and esophagus thoroughly.  No biopsies were taken.    Patient will be discharged home at recovery and will be seen back in the office in 1 week.    Electronically Signed by: Neil Balderrama MD        Dictated Utilizing [employer]

## 2019-11-11 NOTE — ANESTHESIA PREPROCEDURE EVALUATION
Anesthesia Evaluation     Patient summary reviewed and Nursing notes reviewed   no history of anesthetic complications:  NPO Solid Status: > 8 hours  NPO Liquid Status: > 8 hours           Airway   Mallampati: II  TM distance: >3 FB  Neck ROM: full  no difficulty expected  Dental - normal exam     Pulmonary - negative pulmonary ROS and normal exam   (-) asthma, not a smoker  Cardiovascular - normal exam  Exercise tolerance: good (4-7 METS)    NYHA Classification: II  Patient on routine beta blocker and Beta blocker given within 24 hours of surgery    (+) hypertension, past MI  6-12 months, CAD, dysrhythmias Bradycardia, angina, hyperlipidemia,       Neuro/Psych  (+) TIA, numbness,     (-) CVA  GI/Hepatic/Renal/Endo    (+)  GERD, PUD,    (-) liver disease, no renal disease, diabetes    Musculoskeletal     (+) back pain,   Abdominal  - normal exam    Bowel sounds: normal.   Substance History - negative use     OB/GYN negative ob/gyn ROS         Other   arthritis,                        Anesthesia Plan    ASA 3     general     intravenous induction     Anesthetic plan, all risks, benefits, and alternatives have been provided, discussed and informed consent has been obtained with: patient.  Use of blood products discussed with patient  Consented to blood products.

## 2019-11-11 NOTE — H&P
Lyla Vazquez  [address] [facility] [address]  1950  [phone] (home) [phone] (work)     Chief Complaint       Chief Complaint   Patient presents with   • Follow-up       FU Barium Swallow/UGI         HPI  Patient is a 69-year-old white female with a diagnosis of achalasia and is status post Heller myotomy and Nissen fundoplication at [facility] many years ago.  She is always had some persistent dysphasia but is gotten worse.  She had a recent barium swallow which showed that the barium tablet would not pass.     Past Medical History  Medical History        Past Medical History:   Diagnosis Date   • Anemia     • Anesthesia complication       HARD TO WAKE   • Arthritis     • Arthritis     • Chronic bronchitis (CMS/HCC)     • Coronary artery disease     • Elevated cholesterol     • Gastric ulcer     • GERD (gastroesophageal reflux disease)     • History of transfusion     • Hypertension     • Myocardial infarction (CMS/HCC)     • Numbness     • Stroke (CMS/HCC)       TIA   • Varicose veins     • Wears dentures     • Wears glasses              Past Surgical History  Surgical History         Past Surgical History:   Procedure Laterality Date   • ABDOMINAL SURGERY       • BRAVO PROCEDURE N/A 9/16/2019     Procedure: ESOPHAGOGASTRODUODENOSCOPY AND FRIEDMAN;  Surgeon: Neil Balderrama MD;  Location: [facility] OR;  Service: Gastroenterology   • CARDIAC CATHETERIZATION       • CARDIAC CATHETERIZATION N/A 12/17/2018     Procedure: Left Heart Cath;  Surgeon: Sandip Zamora IV, MD;  Location: [facility] INVASIVE LOCATION;  Service: Cardiovascular   • CHOLECYSTECTOMY         laparoscopic   • COLONOSCOPY   2014   • ENDOSCOPY       • FRACTURE SURGERY       • HYSTERECTOMY         benign   • OTHER SURGICAL HISTORY         leg repair   • STOMACH SURGERY                Current Medications  Current Medications          Current Outpatient Medications   Medication Sig Dispense Refill   • aspirin 81 MG EC tablet  Take 1 tablet by mouth Daily. 30 tablet 5   • atorvastatin (LIPITOR) 20 MG tablet TAKE ONE TABLET BY MOUTH DAILY 30 tablet 3   • cetirizine (zyrTEC) 10 MG tablet Take 10 mg by mouth Daily.       • estrogens, conjugated, (PREMARIN) 0.625 MG tablet Take 1 tablet by mouth daily.       • gabapentin (NEURONTIN) 300 MG capsule Take 600 mg by mouth 3 (Three) Times a Day.       • HYDROcodone-acetaminophen (NORCO) 7.5-325 MG per tablet Take 1 tablet by mouth 2 (Two) Times a Day. Take 1 tablet every 4 to 6 hours as needed for pain       • lisinopril-hydrochlorothiazide (PRINZIDE,ZESTORETIC) 20-25 MG per tablet Take 1 tablet by mouth Daily. 30 tablet 5   • metoprolol tartrate (LOPRESSOR) 25 MG tablet Take 0.5 tablets by mouth 2 (Two) Times a Day. 30 tablet 1   • nitroglycerin (NITROSTAT) 0.4 MG SL tablet 1 under the tongue as needed for angina, may repeat q5mins for up three doses 100 tablet 11   • omeprazole (priLOSEC) 40 MG capsule Take 40 mg by mouth As Needed.          No current facility-administered medications for this visit.             Allergies  Sulfa antibiotics        Review of Systems     The Past medical history, family history, social history, medication list, allergies, and Review of Systems has been reviewed and confirmed.     General: negative  Integumentary: negative  Eyes: glasses  ENT: negative  Respiratory: negative  Gastrointestinal: nausea/vomiting, heartburn, blood in stool and abdominal pain  Cardiovascular: slow heart rate  Neurological: dizziness and faintness  Psychiatric: negative  Hematologic/Lymphatic: easy bruising  Genitourinary: negative  Musculoskeletal: painful joints, sore muscles, joint swelling, back pain and joint stiffness  Endocrine: negative  Breasts: negative     Physical Exam   Vitals:       Vitals:     10/08/19 0922   BP: 115/63   Pulse: 68   Resp: 17   Temp: 98 °F (36.7 °C)   SpO2: 98%         Physical Exam   Constitutional: She is oriented to person, place, and time. She appears  well-developed and well-nourished. No distress.   HENT:   Head: Normocephalic.   Right Ear: External ear normal.   Left Ear: External ear normal.   Nose: Nose normal.   Mouth/Throat: Oropharynx is clear and moist.   Eyes: Conjunctivae and EOM are normal. Right eye exhibits no discharge. Left eye exhibits no discharge.   Neck: Normal range of motion. No JVD present. No tracheal deviation present. No thyromegaly present.   Cardiovascular: Normal rate, regular rhythm, normal heart sounds and intact distal pulses. Exam reveals no gallop and no friction rub.   No murmur heard.  Pulmonary/Chest: Effort normal and breath sounds normal. No stridor. No respiratory distress. She has no wheezes. She has no rales. She exhibits no tenderness.   Abdominal: Soft. Bowel sounds are normal. She exhibits no distension and no mass. There is no tenderness. There is no rebound and no guarding. No hernia.   Genitourinary: Rectal exam shows guaiac negative stool.   Musculoskeletal: Normal range of motion. She exhibits no edema, tenderness or deformity.   Lymphadenopathy:     She has no cervical adenopathy.   Neurological: She is alert and oriented to person, place, and time. She has normal reflexes. She displays normal reflexes. No cranial nerve deficit. She exhibits normal muscle tone. Coordination normal.   Skin: Skin is warm and dry. No rash noted. She is not diaphoretic. No erythema. No pallor.   Psychiatric: She has a normal mood and affect. Her behavior is normal. Judgment and thought content normal.         Assessment   History of achalasia  History of Nissen fundoplication  History of Heller myotomy  Dysphasia     Plan  EGD with esophageal dilatation

## 2019-11-11 NOTE — ANESTHESIA POSTPROCEDURE EVALUATION
Patient: Lyla Vazquez    Procedure Summary     Date:  11/11/19 Room / Location:  [facility] OR [address] [place] [address] COR OR    Anesthesia Start:  1213 Anesthesia Stop:  1234    Procedure:  ESOPHAGOGASTRODUODENOSCOPY WITH  DILATATION WITH FLUOROSCOPY (N/A Esophagus) Diagnosis:       Dysphasia      History of Nissen fundoplication      Achalasia      (Dysphasia [R47.02])      (History of Nissen fundoplication [Z98.890])      (Achalasia [K22.0])    Surgeon:  Neil Balderrama MD Provider:  Chuckie Wyatt MD    Anesthesia Type:  general ASA Status:  3          Anesthesia Type: general  Last vitals  BP   127/62 (11/11/19 0919)   Temp   97.3 °F (36.3 °C) (11/11/19 0919)   Pulse   53 (11/11/19 0919)   Resp   18 (11/11/19 0919)     SpO2   94 % (11/11/19 0919)     Post Anesthesia Care and Evaluation    Patient location during evaluation: PHASE II  Patient participation: complete - patient participated  Level of consciousness: awake and alert  Pain score: 1  Pain management: adequate  Airway patency: patent  Anesthetic complications: No anesthetic complications  PONV Status: controlled  Cardiovascular status: acceptable  Respiratory status: acceptable  Hydration status: acceptable

## 2019-11-14 ENCOUNTER — OFFICE VISIT (OUTPATIENT)
Dept: SURGERY | Facility: CLINIC | Age: 69
End: 2019-11-14

## 2019-11-14 VITALS
DIASTOLIC BLOOD PRESSURE: 65 MMHG | HEIGHT: 68 IN | RESPIRATION RATE: 17 BRPM | BODY MASS INDEX: 27.13 KG/M2 | TEMPERATURE: 98.3 F | WEIGHT: 179 LBS | OXYGEN SATURATION: 98 % | SYSTOLIC BLOOD PRESSURE: 112 MMHG | HEART RATE: 70 BPM

## 2019-11-14 DIAGNOSIS — K22.0 ACHALASIA: ICD-10-CM

## 2019-11-14 DIAGNOSIS — Z98.890 HISTORY OF NISSEN FUNDOPLICATION: ICD-10-CM

## 2019-11-14 DIAGNOSIS — R47.02 DYSPHASIA: Primary | ICD-10-CM

## 2019-11-14 PROCEDURE — 99213 OFFICE O/P EST LOW 20 MIN: CPT | Performed by: SURGERY

## 2019-11-14 NOTE — PROGRESS NOTES
11/14/2019    Patient Information  Lyla Vazquez  [address] [facility] [place] [address]  1950  [phone] (home) [phone] (work)    Chief Complaint  Chief Complaint   Patient presents with   • Follow-up     FU EGD with Esophageal Dilatation        HPI  Patient is a 69-year-old white female with a history of achalasia, status post Heller myotomy with Nissen fundoplication done at  many years ago.  Approximately week ago I performed EGD with dilatation she says she is markedly improved.    Past Medical History  Past Medical History:   Diagnosis Date   • Anemia    • Anesthesia complication     HARD TO WAKE   • Arthritis    • Arthritis    • Chronic bronchitis (CMS/HCC)    • Coronary artery disease    • Elevated cholesterol    • Gastric ulcer    • GERD (gastroesophageal reflux disease)    • History of transfusion    • Hypertension    • Myocardial infarction (CMS/HCC)    • Numbness    • Stroke (CMS/HCC)     TIA   • Varicose veins    • Wears dentures    • Wears glasses        Past Surgical History  Past Surgical History:   Procedure Laterality Date   • ABDOMINAL SURGERY     • BRAVO PROCEDURE N/A 9/16/2019    Procedure: ESOPHAGOGASTRODUODENOSCOPY AND FRIEDMAN;  Surgeon: Neil Balderrama MD;  Location: [facility];  Service: Gastroenterology   • CARDIAC CATHETERIZATION     • CARDIAC CATHETERIZATION N/A 12/17/2018    Procedure: Left Heart Cath;  Surgeon: Sandip Zamora IV, MD;  Location: [facility] CATH INVASIVE LOCATION;  Service: Cardiovascular   • CHOLECYSTECTOMY      laparoscopic   • COLONOSCOPY  2014   • ENDOSCOPY     • ENDOSCOPY N/A 11/11/2019    Procedure: ESOPHAGOGASTRODUODENOSCOPY WITH  DILATATION WITH FLUOROSCOPY;  Surgeon: Neil Balderrama MD;  Location: [facility];  Service: Gastroenterology   • FRACTURE SURGERY     • HYSTERECTOMY      benign   • OTHER SURGICAL HISTORY      leg repair   • STOMACH SURGERY         Current Medications  Current Outpatient Medications   Medication Sig Dispense Refill   • aspirin  81 MG EC tablet Take 1 tablet by mouth Daily. 30 tablet 5   • atorvastatin (LIPITOR) 20 MG tablet TAKE ONE TABLET BY MOUTH DAILY 30 tablet 3   • cetirizine (zyrTEC) 10 MG tablet Take 10 mg by mouth Daily.     • estrogens, conjugated, (PREMARIN) 0.625 MG tablet Take 1 tablet by mouth daily.     • gabapentin (NEURONTIN) 300 MG capsule Take 600 mg by mouth 3 (Three) Times a Day.     • HYDROcodone-acetaminophen (NORCO) 7.5-325 MG per tablet Take 1 tablet by mouth 2 (Two) Times a Day. Take 1 tablet every 4 to 6 hours as needed for pain     • lisinopril-hydrochlorothiazide (PRINZIDE,ZESTORETIC) 20-25 MG per tablet Take 1 tablet by mouth Daily. 30 tablet 5   • metoprolol tartrate (LOPRESSOR) 25 MG tablet Take 0.5 tablets by mouth 2 (Two) Times a Day. 30 tablet 1   • nitroglycerin (NITROSTAT) 0.4 MG SL tablet 1 under the tongue as needed for angina, may repeat q5mins for up three doses 100 tablet 11   • omeprazole (priLOSEC) 40 MG capsule Take 40 mg by mouth As Needed.       No current facility-administered medications for this visit.        Allergies  Sulfa antibiotics      Review of Systems    The Past medical history, family history, social history, medication list, allergies, and Review of Systems has been reviewed and confirmed.    General: negative  Integumentary: negative  Eyes: glasses  ENT: negative  Respiratory: negative  Gastrointestinal: nausea/vomiting, heartburn, blood in stool and abdominal pain  Cardiovascular: slow heart rate  Neurological: dizziness and faintness  Psychiatric: negative  Hematologic/Lymphatic: easy bruising  Genitourinary: negative  Musculoskeletal: painful joints, sore muscles, joint swelling, back pain and joint stiffness  Endocrine: negative  Breasts: negative      Vitals:  Vitals:    11/14/19 1346   BP: 112/65   Pulse: 70   Resp: 17   Temp: 98.3 °F (36.8 °C)   SpO2: 98%       Body mass index is 27.22 kg/m².    Physical Exam  Physical Exam  General 69-year-old white female no  distress  Assessment   History of Heller myotomy and Nissen fundoplication for achalasia  Dysphasia responding to dilatation    Plan  Return PRN        This document signed by Neil Balderrama MD November 14, 2019 2:30 PM

## 2020-02-28 ENCOUNTER — OFFICE VISIT (OUTPATIENT)
Dept: CARDIOLOGY | Facility: CLINIC | Age: 70
End: 2020-02-28

## 2020-02-28 VITALS
DIASTOLIC BLOOD PRESSURE: 68 MMHG | HEIGHT: 68 IN | BODY MASS INDEX: 28.34 KG/M2 | SYSTOLIC BLOOD PRESSURE: 126 MMHG | OXYGEN SATURATION: 97 % | HEART RATE: 64 BPM | WEIGHT: 187 LBS

## 2020-02-28 DIAGNOSIS — E78.5 HYPERLIPIDEMIA LDL GOAL <70: ICD-10-CM

## 2020-02-28 DIAGNOSIS — R55 PRE-SYNCOPE: ICD-10-CM

## 2020-02-28 DIAGNOSIS — I10 ESSENTIAL HYPERTENSION: ICD-10-CM

## 2020-02-28 DIAGNOSIS — I25.119 CORONARY ARTERY DISEASE INVOLVING NATIVE CORONARY ARTERY OF NATIVE HEART WITH ANGINA PECTORIS (HCC): Primary | ICD-10-CM

## 2020-02-28 PROCEDURE — 99214 OFFICE O/P EST MOD 30 MIN: CPT | Performed by: NURSE PRACTITIONER

## 2020-02-28 RX ORDER — ISOSORBIDE MONONITRATE 30 MG/1
30 TABLET, EXTENDED RELEASE ORAL EVERY MORNING
Qty: 90 TABLET | Refills: 3 | Status: SHIPPED | OUTPATIENT
Start: 2020-02-28 | End: 2020-10-30

## 2020-02-28 RX ORDER — PANTOPRAZOLE SODIUM 40 MG/1
40 TABLET, DELAYED RELEASE ORAL DAILY
COMMUNITY

## 2020-02-28 RX ORDER — AZELASTINE 1 MG/ML
2 SPRAY, METERED NASAL AS NEEDED
COMMUNITY

## 2020-02-28 NOTE — ASSESSMENT & PLAN NOTE
· Recent monitor showed no arrhythmia to account for patient's symptoms  · Patient continues to complain of lightheadedness and bumping into walls will order bilateral carotid duplex study

## 2020-02-28 NOTE — PROGRESS NOTES
[facility] at [facility]  Office Visit Note    Encounter Date:02/28/2020    Patient ID: Lyla Vazquez is a 69 y.o. female who resides in [place], [place]    CC/Reason for visit:    · Coronary artery disease         Problem List Items Addressed This Visit        Cardiovascular and Mediastinum    Coronary artery disease involving native coronary artery of native heart with angina pectoris (CMS/HCC) - Primary    Overview     · Cardiac catheterization (10/14/13): Nonobstructive CAD, Normal EF.  · Exercise nuclear stress (11/26/18): Moderate exercise intolerance with development of chest pain and nonsustained VT. Normal perfusion images with no ischemia. LVEF 50%  · Echo (11/26/18): To moderate TR.  Normal LVEF.  Mild pulmonary hypertension  · Cardiac catheterization (12/17/18): Mild nonobstructive CAD. Normal LVEF.         Current Assessment & Plan     · Patient reports atypical symptoms uncertain whether related to cardiac  · Start isosorbide 30 mg daily  · Continue aspirin 81 mg daily  · Avoid beta-blocker therapy due to causing symptomatic bradycardia  · If chest pain becomes related to exertional activity she should contact us and we will repeat stress testing           Relevant Medications    isosorbide mononitrate (IMDUR) 30 MG 24 hr tablet    Pre-syncope    Overview     · 2-week monitor (05/2019): Normal sinus rhythm.  Rare PACs/PVCs.  Nonspecific Limited SVT         Current Assessment & Plan     · Recent monitor showed no arrhythmia to account for patient's symptoms  · Patient continues to complain of lightheadedness and bumping into walls will order bilateral carotid duplex study         Relevant Orders    Duplex Carotid Ultrasound CAR    Essential hypertension    Current Assessment & Plan     · Hypertension is controlled  · Continue lisinopril/hydrochlorothiazide 20/25 mg 1 tablet daily         Hyperlipidemia LDL goal <70    Overview     · High intensity statin therapy indicated given  "the presence of coronary artery disease, albeit mild         Current Assessment & Plan     · Continue Lipitor 20 mg daily  · Obtain CMP and lipid profile today         Relevant Orders    Lipid Panel    Comprehensive Metabolic Panel        The patient is experiencing atypical chest discomfort she describes as a \"vice\" and tends to occur with stress.  This appears to be atypical for cardiac but I will prescribe her isosorbide 30 mg daily see if helps her symptoms.  If her symptoms progress or worsen or become related to exertional activity she should contact us and we will repeat stress testing.  I will send her for lipid panel and CMP today.  The patient is also reporting lightheadedness that occurs frequently.  I will send her for a bilateral carotid duplex study to rule out carotid artery disease.  She will follow-up in 6 months or sooner if needed       · Patient has atypical chest pain not felt to be cardiac in nature but will trial isosorbide 30 mg daily to see if improves  · If chest pain becomes related to exertional activities and progresses or worsens we will repeat stress testing  · Obtain CMP and lipid profile today  Return in about 6 months (around 8/28/2020), or if symptoms worsen or fail to improve, for Follow-up with Dr. Zamora next visit.              Lyla Vazquez returns today for follow-up of her coronary artery disease and cardiac risk factors.    At her last visit she reported presyncopal spells and a 2-week monitor was ordered.  Results showed normal sinus rhythm with rare PACs/PVCs and a nonspecific limited run of SVT but nothing to account for her symptoms.  The patient reports since her last visit she underwent oral surgery.  Postprocedure the patient had gauze in her mouth which caused her to feel smothered and she panicked.  She reported her heart rate increased.  The dentist gave her fluids and oxygen and it resolved.  The patient reports still having occasional chest discomfort that is " more related to stress at work.  She has not used any of the sublingual nitroglycerin prescribed for her.  She does not take beta-blocker therapy due to baseline lower heart rates.  The patient still experiences lightheadedness and weakness and sometimes has to pull her car over off the side of the road because of it.  Patient's last cardiac cath was 14 months ago which showed mild nonobstructive CAD.  She does report having GI issues and is on an acid reducer.  She is also had difficulty with esophageal strictures and had to undergo dilation in the past.  She is on statin therapy and tolerating without myalgias.  She has not had a recent lipid panel.    Review of Systems   Constitution: Negative for malaise/fatigue.   Eyes: Negative for vision loss in left eye and vision loss in right eye.   Cardiovascular: Positive for chest pain, leg swelling and paroxysmal nocturnal dyspnea. Negative for dyspnea on exertion, near-syncope, orthopnea, palpitations and syncope.   Musculoskeletal: Negative for myalgias.   Neurological: Positive for light-headedness. Negative for brief paralysis, excessive daytime sleepiness, focal weakness, numbness, paresthesias and weakness.   All other systems reviewed and are negative.      The patient's past medical, social, family history and ROS reviewed in the patient's electronic medical record.    Allergies   Allergen Reactions   • Sulfa Antibiotics Anaphylaxis         Current Outpatient Medications:   •  aspirin 81 MG EC tablet, Take 1 tablet by mouth Daily., Disp: 30 tablet, Rfl: 5  •  atorvastatin (LIPITOR) 20 MG tablet, TAKE ONE TABLET BY MOUTH DAILY, Disp: 30 tablet, Rfl: 3  •  azelastine (ASTELIN) 0.1 % nasal spray, 2 sprays into the nostril(s) as directed by provider As Needed for Rhinitis. Use in each nostril as directed, Disp: , Rfl:   •  cetirizine (zyrTEC) 10 MG tablet, Take 10 mg by mouth Daily., Disp: , Rfl:   •  estrogens, conjugated, (PREMARIN) 0.625 MG tablet, Take 1 tablet  by mouth daily., Disp: , Rfl:   •  gabapentin (NEURONTIN) 300 MG capsule, Take 600 mg by mouth 3 (Three) Times a Day., Disp: , Rfl:   •  HYDROcodone-acetaminophen (NORCO)  MG per tablet, Take 1 tablet by mouth 2 (Two) Times a Day. Take 1 tablet every 4 to 6 hours as needed for pain, Disp: , Rfl:   •  lisinopril-hydrochlorothiazide (PRINZIDE,ZESTORETIC) 20-25 MG per tablet, Take 1 tablet by mouth Daily., Disp: 30 tablet, Rfl: 5  •  nitroglycerin (NITROSTAT) 0.4 MG SL tablet, 1 under the tongue as needed for angina, may repeat q5mins for up three doses, Disp: 100 tablet, Rfl: 11  •  pantoprazole (PROTONIX) 40 MG EC tablet, Take 40 mg by mouth Daily., Disp: , Rfl:   •  isosorbide mononitrate (IMDUR) 30 MG 24 hr tablet, Take 1 tablet by mouth Every Morning., Disp: 90 tablet, Rfl: 3    Past Medical History:   Diagnosis Date   • Anemia    • Anesthesia complication     HARD TO WAKE   • Arthritis    • Arthritis    • Chronic bronchitis (CMS/HCC)    • Coronary artery disease    • Elevated cholesterol    • Gastric ulcer    • GERD (gastroesophageal reflux disease)    • History of transfusion    • Hypertension    • Myocardial infarction (CMS/HCC)    • Numbness    • Stroke (CMS/HCC)     TIA   • Varicose veins    • Wears dentures    • Wears glasses        Past Surgical History:   Procedure Laterality Date   • ABDOMINAL SURGERY     • BRAVO PROCEDURE N/A 9/16/2019    Procedure: ESOPHAGOGASTRODUODENOSCOPY AND FRIEDMAN;  Surgeon: Neil Balderrama MD;  Location:  [facility];  Service: Gastroenterology   • CARDIAC CATHETERIZATION     • CARDIAC CATHETERIZATION N/A 12/17/2018    Procedure: Left Heart Cath;  Surgeon: Sandip Zamora IV, MD;  Location:  [facility];  Service: Cardiovascular   • CHOLECYSTECTOMY      laparoscopic   • COLONOSCOPY  2014   • ENDOSCOPY     • ENDOSCOPY N/A 11/11/2019    Procedure: ESOPHAGOGASTRODUODENOSCOPY WITH  DILATATION WITH FLUOROSCOPY;  Surgeon: Neil Balderrama MD;  Location:   "[facility];  Service: Gastroenterology   • FRACTURE SURGERY     • HYSTERECTOMY      benign   • OTHER SURGICAL HISTORY      leg repair   • STOMACH SURGERY         Family History   Problem Relation Age of Onset   • Diabetes Other    • Hypertension Other    • Ovarian cancer Other    • Cancer Mother         bladder   • Cancer Sister         liver   • Diabetes Sister    • Diabetes Brother    • No Known Problems Father    • Breast cancer Neg Hx        Social History     Tobacco Use   • Smoking status: Never Smoker   • Smokeless tobacco: Never Used   Substance Use Topics   • Alcohol use: No           Blood pressure 126/68, pulse 64, height 172.7 cm (68\"), weight 84.8 kg (187 lb), SpO2 97 %.  Body mass index is 28.43 kg/m².  Vitals:    02/28/20 1343   Patient Position: Sitting       Physical Exam   Constitutional: She is oriented to person, place, and time. She appears well-developed and well-nourished.   HENT:   Head: Normocephalic and atraumatic.   Eyes: Conjunctivae are normal. No scleral icterus.   Neck: Normal range of motion. No JVD present. Carotid bruit is not present. No thyromegaly present.   Cardiovascular: Normal rate and regular rhythm. Exam reveals no gallop.   No murmur heard.  Pulmonary/Chest: Effort normal and breath sounds normal.   Abdominal: Soft. She exhibits no distension and no mass. There is no hepatosplenomegaly.   Musculoskeletal: She exhibits no edema.   Neurological: She is alert and oriented to person, place, and time.   Skin: Skin is warm and dry. No rash noted.   Psychiatric: She has a normal mood and affect. Her behavior is normal.       Data Review (reviewed with patient):     Procedures    Lab Results   Component Value Date    CHOL 138 12/16/2018    TRIG 67 12/16/2018    HDL 68 (H) 12/16/2018    LDL 66 12/16/2018    AST 15 12/16/2018    ALT 7 12/16/2018       Lab Results   Component Value Date    HGBA1C 5.30 12/16/2018           JUJU Cason    2/28/2020  "

## 2020-02-28 NOTE — ASSESSMENT & PLAN NOTE
· Patient reports atypical symptoms uncertain whether related to cardiac  · Start isosorbide 30 mg daily  · Continue aspirin 81 mg daily  · Avoid beta-blocker therapy due to causing symptomatic bradycardia  · If chest pain becomes related to exertional activity she should contact us and we will repeat stress testing

## 2020-02-29 LAB
ALBUMIN SERPL-MCNC: 4.1 G/DL (ref 3.5–5.2)
ALBUMIN/GLOB SERPL: 2 G/DL
ALP SERPL-CCNC: 96 U/L (ref 39–117)
ALT SERPL-CCNC: 6 U/L (ref 1–33)
AST SERPL-CCNC: 10 U/L (ref 1–32)
BILIRUB SERPL-MCNC: 0.5 MG/DL (ref 0.2–1.2)
BUN SERPL-MCNC: 16 MG/DL (ref 8–23)
BUN/CREAT SERPL: 18.4 (ref 7–25)
CALCIUM SERPL-MCNC: 9 MG/DL (ref 8.6–10.5)
CHLORIDE SERPL-SCNC: 102 MMOL/L (ref 98–107)
CHOLEST SERPL-MCNC: 126 MG/DL (ref 0–200)
CO2 SERPL-SCNC: 28.6 MMOL/L (ref 22–29)
CREAT SERPL-MCNC: 0.87 MG/DL (ref 0.57–1)
GLOBULIN SER CALC-MCNC: 2.1 GM/DL
GLUCOSE SERPL-MCNC: 93 MG/DL (ref 65–99)
HDLC SERPL-MCNC: 83 MG/DL (ref 40–60)
LDLC SERPL CALC-MCNC: 32 MG/DL (ref 0–100)
POTASSIUM SERPL-SCNC: 3.8 MMOL/L (ref 3.5–5.2)
PROT SERPL-MCNC: 6.2 G/DL (ref 6–8.5)
SODIUM SERPL-SCNC: 141 MMOL/L (ref 136–145)
TRIGL SERPL-MCNC: 54 MG/DL (ref 0–150)
VLDLC SERPL CALC-MCNC: 10.8 MG/DL

## 2020-03-12 ENCOUNTER — TELEPHONE (OUTPATIENT)
Dept: SURGERY | Facility: CLINIC | Age: 70
End: 2020-03-12

## 2020-03-20 ENCOUNTER — HOSPITAL ENCOUNTER (OUTPATIENT)
Dept: CARDIOLOGY | Facility: HOSPITAL | Age: 70
Discharge: HOME | End: 2020-03-20
Admitting: NURSE PRACTITIONER

## 2020-03-20 DIAGNOSIS — R55 PRE-SYNCOPE: ICD-10-CM

## 2020-03-20 PROCEDURE — 93880 EXTRACRANIAL BILAT STUDY: CPT | Performed by: INTERNAL MEDICINE

## 2020-03-20 PROCEDURE — 93880 EXTRACRANIAL BILAT STUDY: CPT

## 2020-03-23 LAB
BH CV XLRA MEAS LEFT DIST CCA EDV: 27.5 CM/SEC
BH CV XLRA MEAS LEFT DIST CCA PSV: 123 CM/SEC
BH CV XLRA MEAS LEFT DIST ICA EDV: 53 CM/SEC
BH CV XLRA MEAS LEFT DIST ICA PSV: 111 CM/SEC
BH CV XLRA MEAS LEFT ICA/CCA RATIO: 0.83
BH CV XLRA MEAS LEFT MID CCA EDV: 35.4 CM/SEC
BH CV XLRA MEAS LEFT MID CCA PSV: 145 CM/SEC
BH CV XLRA MEAS LEFT MID ICA EDV: 31.4 CM/SEC
BH CV XLRA MEAS LEFT MID ICA PSV: 95.3 CM/SEC
BH CV XLRA MEAS LEFT PROX CCA EDV: 26.5 CM/SEC
BH CV XLRA MEAS LEFT PROX CCA PSV: 133 CM/SEC
BH CV XLRA MEAS LEFT PROX ECA EDV: 20.6 CM/SEC
BH CV XLRA MEAS LEFT PROX ECA PSV: 93.3 CM/SEC
BH CV XLRA MEAS LEFT PROX ICA EDV: 33.4 CM/SEC
BH CV XLRA MEAS LEFT PROX ICA PSV: 102 CM/SEC
BH CV XLRA MEAS LEFT PROX SCLA EDV: 0 CM/SEC
BH CV XLRA MEAS LEFT PROX SCLA PSV: 65.4 CM/SEC
BH CV XLRA MEAS LEFT VERTEBRAL A EDV: 16.7 CM/SEC
BH CV XLRA MEAS LEFT VERTEBRAL A PSV: 50.1 CM/SEC
BH CV XLRA MEAS RIGHT DIST CCA EDV: 35.4 CM/SEC
BH CV XLRA MEAS RIGHT DIST CCA PSV: 128 CM/SEC
BH CV XLRA MEAS RIGHT DIST ICA EDV: 44.2 CM/SEC
BH CV XLRA MEAS RIGHT DIST ICA PSV: 100 CM/SEC
BH CV XLRA MEAS RIGHT ICA/CCA RATIO: 0.93
BH CV XLRA MEAS RIGHT MID CCA EDV: 35.4 CM/SEC
BH CV XLRA MEAS RIGHT MID CCA PSV: 129 CM/SEC
BH CV XLRA MEAS RIGHT MID ICA EDV: 42.2 CM/SEC
BH CV XLRA MEAS RIGHT MID ICA PSV: 104 CM/SEC
BH CV XLRA MEAS RIGHT PROX CCA EDV: 32.4 CM/SEC
BH CV XLRA MEAS RIGHT PROX CCA PSV: 145 CM/SEC
BH CV XLRA MEAS RIGHT PROX ECA EDV: 19.6 CM/SEC
BH CV XLRA MEAS RIGHT PROX ECA PSV: 129 CM/SEC
BH CV XLRA MEAS RIGHT PROX ICA EDV: 40.3 CM/SEC
BH CV XLRA MEAS RIGHT PROX ICA PSV: 119 CM/SEC
BH CV XLRA MEAS RIGHT PROX SCLA EDV: 0 CM/SEC
BH CV XLRA MEAS RIGHT PROX SCLA PSV: 71.7 CM/SEC
BH CV XLRA MEAS RIGHT VERTEBRAL A EDV: 20.7 CM/SEC
BH CV XLRA MEAS RIGHT VERTEBRAL A PSV: 62.2 CM/SEC

## 2020-09-01 ENCOUNTER — TRANSCRIBE ORDERS (OUTPATIENT)
Dept: ADMINISTRATIVE | Facility: HOSPITAL | Age: 70
End: 2020-09-01

## 2020-09-01 DIAGNOSIS — R55 SYNCOPE AND COLLAPSE: Primary | ICD-10-CM

## 2020-09-14 ENCOUNTER — APPOINTMENT (OUTPATIENT)
Dept: CT IMAGING | Facility: HOSPITAL | Age: 70
End: 2020-09-14

## 2020-09-17 ENCOUNTER — HOSPITAL ENCOUNTER (OUTPATIENT)
Dept: CT IMAGING | Facility: HOSPITAL | Age: 70
Discharge: HOME | End: 2020-09-17
Admitting: FAMILY MEDICINE

## 2020-09-17 DIAGNOSIS — R55 SYNCOPE AND COLLAPSE: ICD-10-CM

## 2020-09-17 PROCEDURE — 82565 ASSAY OF CREATININE: CPT

## 2020-09-17 PROCEDURE — 70470 CT HEAD/BRAIN W/O & W/DYE: CPT | Performed by: RADIOLOGY

## 2020-09-17 PROCEDURE — 70470 CT HEAD/BRAIN W/O & W/DYE: CPT

## 2020-09-17 PROCEDURE — 0 IOVERSOL 68 % SOLUTION: Performed by: FAMILY MEDICINE

## 2020-09-17 RX ADMIN — IOVERSOL 100 ML: 678 INJECTION INTRA-ARTERIAL; INTRAVENOUS at 10:48

## 2020-09-21 LAB — CREAT BLDA-MCNC: 1 MG/DL (ref 0.6–1.3)

## 2020-10-29 NOTE — PROGRESS NOTES
[facility] at [facility]  Office Visit Note    DATE: 10/30/2020    IDENTIFICATION: Lyla Vazquez is a 70 y.o. female who resides in [place], [place]. she is a manager at [employer].    REASON FOR VISIT:  • Coronary artery disease  • Chronic edema  • Hypertension            Lyla Vazquez returns to the office today in follow-up of her mild coronary disease, cardiac risk factors.  The patient is doing well.  She denies any chest pain, shortness of breath, palpitations, or syncope.  She has not had blood work performed recently.    Her primary complaint today is that of recurring headaches.  The headaches are unilateral.  She is presently on no nitroglycerin medicines.      Review of Systems   Constitution: Negative for malaise/fatigue.   Eyes: Negative for vision loss in left eye and vision loss in right eye.   Cardiovascular: Negative for chest pain, dyspnea on exertion, near-syncope, orthopnea, palpitations, paroxysmal nocturnal dyspnea and syncope.   Musculoskeletal: Negative for myalgias.   Neurological: Positive for headaches and numbness. Negative for brief paralysis, excessive daytime sleepiness, focal weakness, paresthesias and weakness.   All other systems reviewed and are negative.      The patient's past medical, social, family history and ROS reviewed in the patient's electronic medical record.    Allergies   Allergen Reactions   • Sulfa Antibiotics Anaphylaxis         Current Outpatient Medications:   •  aspirin (aspirin) 81 MG EC tablet, Take 1 tablet by mouth Daily., Disp: 90 tablet, Rfl: 3  •  atorvastatin (LIPITOR) 20 MG tablet, Take 1 tablet by mouth Every Night., Disp: 90 tablet, Rfl: 3  •  azelastine (ASTELIN) 0.1 % nasal spray, 2 sprays into the nostril(s) as directed by provider As Needed for Rhinitis. Use in each nostril as directed, Disp: , Rfl:   •  cetirizine (zyrTEC) 10 MG tablet, Take 10 mg by mouth Daily., Disp: , Rfl:   •  cyclobenzaprine (FLEXERIL) 10 MG tablet, Take 10  mg by mouth Every Night., Disp: , Rfl:   •  estrogens, conjugated, (PREMARIN) 0.625 MG tablet, Take 1 tablet by mouth daily., Disp: , Rfl:   •  gabapentin (NEURONTIN) 300 MG capsule, Take 600 mg by mouth 3 (Three) Times a Day., Disp: , Rfl:   •  HYDROcodone-acetaminophen (NORCO)  MG per tablet, Take 1 tablet by mouth 2 (Two) Times a Day. Take 1 tablet every 4 to 6 hours as needed for pain, Disp: , Rfl:   •  lisinopril-hydrochlorothiazide (PRINZIDE,ZESTORETIC) 20-25 MG per tablet, Take 1 tablet by mouth Daily., Disp: 90 tablet, Rfl: 3  •  pantoprazole (PROTONIX) 40 MG EC tablet, Take 40 mg by mouth Daily., Disp: , Rfl:     Past Medical History:   Diagnosis Date   • Anemia    • Anesthesia complication     HARD TO WAKE   • Arthritis    • Arthritis    • Chronic bronchitis (CMS/HCC)    • Coronary artery disease    • Elevated cholesterol    • Epigastric pain 8/30/2019    Added automatically from request for surgery [id number]   • Gastric ulcer    • GERD (gastroesophageal reflux disease)    • H/O CT scan of brain    • Hematoma of left lower extremity 10/13/2016   • History of transfusion    • Hypertension    • Myocardial infarction (CMS/HCC)    • Numbness    • Stroke (CMS/HCC)     TIA   • Varicose veins    • Wears dentures    • Wears glasses        Past Surgical History:   Procedure Laterality Date   • ABDOMINAL SURGERY     • BRAVO PROCEDURE N/A 9/16/2019    Procedure: ESOPHAGOGASTRODUODENOSCOPY AND FRIEDMAN;  Surgeon: Neil Balderrama MD;  Location:  [facility];  Service: Gastroenterology   • CARDIAC CATHETERIZATION     • CARDIAC CATHETERIZATION N/A 12/17/2018    Procedure: Left Heart Cath;  Surgeon: Sandip Zamora IV, MD;  Location:  [facility];  Service: Cardiovascular   • CHOLECYSTECTOMY      laparoscopic   • COLONOSCOPY  2014   • ENDOSCOPY     • ENDOSCOPY N/A 11/11/2019    Procedure: ESOPHAGOGASTRODUODENOSCOPY WITH  DILATATION WITH FLUOROSCOPY;  Surgeon: Neil Balderrama MD;  Location:   "[facility];  Service: Gastroenterology   • FRACTURE SURGERY     • HYSTERECTOMY      benign   • OTHER SURGICAL HISTORY      leg repair   • STOMACH SURGERY         Family History   Problem Relation Age of Onset   • Diabetes Other    • Hypertension Other    • Ovarian cancer Other    • Cancer Mother         bladder   • Cancer Sister         liver   • Diabetes Sister    • Diabetes Brother    • No Known Problems Father    • Breast cancer Neg Hx        Social History     Tobacco Use   • Smoking status: Never Smoker   • Smokeless tobacco: Never Used   Substance Use Topics   • Alcohol use: No           Blood pressure 110/60, pulse 76, temperature 97 °F (36.1 °C), height 172.7 cm (68\"), weight 88.6 kg (195 lb 6.4 oz), SpO2 99 %.  Body mass index is 29.71 kg/m².  Vitals:    10/30/20 1501   Patient Position: Sitting       Constitutional:       Appearance: Well-developed.   Eyes:      General: No scleral icterus.     Pupils: Pupils are equal, round, and reactive to light.   HENT:      Head: Normocephalic and atraumatic.   Neck:      Thyroid: No thyromegaly.      Vascular: No carotid bruit or JVD.   Pulmonary:      Effort: Pulmonary effort is normal.      Breath sounds: Normal breath sounds.   Cardiovascular:      Normal rate. Regular rhythm.      Murmurs: There is no murmur.      No gallop.   Abdominal:      Palpations: Abdomen is soft. There is no abdominal mass.      Tenderness: There is no abdominal tenderness.   Musculoskeletal:      Extremities: No clubbing present.  Skin:     General: Skin is warm and dry. There is no cyanosis.   Neurological:      Mental Status: Alert and oriented to person, place, and time.   Psychiatric:         Behavior: Behavior normal.         Data Review (reviewed with patient):       ECG 12 Lead    Date/Time: 10/30/2020 3:42 PM  Performed by: Sandip Zamora IV, MD  Authorized by: Sandip Zamora IV, MD   Comparison: compared with previous ECG from 11/5/2018  Similar to previous " ECG  Rhythm: sinus rhythm  BPM: 64    Clinical impression: normal ECG            Lab Results   Component Value Date    GLUCOSE 106 (H) 12/16/2018    BUN 16 02/28/2020    CREATININE 1.00 09/17/2020    EGFRIFNONA 65 02/28/2020    EGFRIFAFRI 79 02/28/2020    BCR 18.4 02/28/2020    K 3.8 02/28/2020    CO2 28.6 02/28/2020    CALCIUM 9.0 02/28/2020    ALBUMIN 4.10 02/28/2020    ALKPHOS 96 02/28/2020    AST 10 02/28/2020    ALT 6 02/28/2020      Lab Results   Component Value Date    CHLPL 126 02/28/2020    TRIG 54 02/28/2020    HDL 83 (H) 02/28/2020    LDL 32 02/28/2020     Lab Results   Component Value Date    HGBA1C 5.30 12/16/2018     Lab Results   Component Value Date    WBC 4.63 12/16/2018    HGB 12.7 12/16/2018    HCT 39.3 12/16/2018    MCV 92.3 12/16/2018     (L) 12/16/2018            Problem List Items Addressed This Visit        Cardiology Problems    Coronary artery disease involving native coronary artery of native heart with angina pectoris (CMS/[facility]) - Primary    Overview     · Cardiac catheterization (10/14/13): Nonobstructive CAD. Normal EF.  · Exercise nuclear stress (11/26/18): Moderate exercise intolerance with development of chest pain and nonsustained VT. Normal perfusion images with no ischemia. LVEF 50%  · Echo (11/26/18):  Normal LVEF.  Mild pulmonary hypertension  · Cardiac catheterization (12/17/18): Mild nonobstructive CAD. Normal LVEF.         Current Assessment & Plan     · No angina  · Continue low-dose aspirin, statin therapy         Relevant Medications    aspirin (aspirin) 81 MG EC tablet    Other Relevant Orders    CBC (No Diff)    Essential hypertension    Overview     • Target blood pressure <130/80 mmHg         Current Assessment & Plan     · Controlled  · Continue present medical therapy         Relevant Medications    lisinopril-hydrochlorothiazide (PRINZIDE,ZESTORETIC) 20-25 MG per tablet    Hyperlipidemia LDL goal <70    Overview     · High intensity statin therapy indicated  given the presence of coronary artery disease, albeit mild         Current Assessment & Plan     · Obtain CMP and lipids today         Relevant Medications    atorvastatin (LIPITOR) 20 MG tablet    Other Relevant Orders    Comprehensive Metabolic Panel    Lipid Panel               · Obtain CMP, lipids, CBC  · Continue present medical therapy  Return in about 1 year (around 10/30/2021).      Sandip Zamora IV MD, FAC, [facility]  10/30/2020

## 2020-10-30 ENCOUNTER — OFFICE VISIT (OUTPATIENT)
Dept: CARDIOLOGY | Facility: CLINIC | Age: 70
End: 2020-10-30

## 2020-10-30 VITALS
HEIGHT: 68 IN | HEART RATE: 76 BPM | BODY MASS INDEX: 29.61 KG/M2 | OXYGEN SATURATION: 99 % | TEMPERATURE: 97 F | SYSTOLIC BLOOD PRESSURE: 110 MMHG | WEIGHT: 195.4 LBS | DIASTOLIC BLOOD PRESSURE: 60 MMHG

## 2020-10-30 DIAGNOSIS — I25.119 CORONARY ARTERY DISEASE INVOLVING NATIVE CORONARY ARTERY OF NATIVE HEART WITH ANGINA PECTORIS (HCC): Primary | ICD-10-CM

## 2020-10-30 DIAGNOSIS — E78.5 HYPERLIPIDEMIA LDL GOAL <70: ICD-10-CM

## 2020-10-30 DIAGNOSIS — I10 ESSENTIAL HYPERTENSION: ICD-10-CM

## 2020-10-30 PROBLEM — R60.0 LOCALIZED EDEMA: Status: RESOLVED | Noted: 2018-11-05 | Resolved: 2020-10-30

## 2020-10-30 PROBLEM — R10.13 EPIGASTRIC PAIN: Status: RESOLVED | Noted: 2019-08-30 | Resolved: 2020-10-30

## 2020-10-30 PROBLEM — R55 PRE-SYNCOPE: Status: RESOLVED | Noted: 2019-03-22 | Resolved: 2020-10-30

## 2020-10-30 PROCEDURE — 93000 ELECTROCARDIOGRAM COMPLETE: CPT | Performed by: INTERNAL MEDICINE

## 2020-10-30 PROCEDURE — 99214 OFFICE O/P EST MOD 30 MIN: CPT | Performed by: INTERNAL MEDICINE

## 2020-10-30 RX ORDER — ATORVASTATIN CALCIUM 20 MG/1
20 TABLET, FILM COATED ORAL NIGHTLY
Qty: 90 TABLET | Refills: 3 | Status: ON HOLD | OUTPATIENT
Start: 2020-10-30 | End: 2020-11-04

## 2020-10-30 RX ORDER — ASPIRIN 81 MG/1
81 TABLET ORAL DAILY
Qty: 90 TABLET | Refills: 3 | Status: SHIPPED | OUTPATIENT
Start: 2020-10-30

## 2020-10-30 RX ORDER — CYCLOBENZAPRINE HCL 10 MG
10 TABLET ORAL NIGHTLY
COMMUNITY
End: 2022-12-09

## 2020-10-30 RX ORDER — LISINOPRIL AND HYDROCHLOROTHIAZIDE 25; 20 MG/1; MG/1
1 TABLET ORAL DAILY
Qty: 90 TABLET | Refills: 3 | Status: SHIPPED | OUTPATIENT
Start: 2020-10-30 | End: 2022-12-09

## 2020-10-31 LAB
ALBUMIN SERPL-MCNC: 3.8 G/DL (ref 3.5–5.2)
ALBUMIN/GLOB SERPL: 2 G/DL
ALP SERPL-CCNC: 113 U/L (ref 39–117)
ALT SERPL-CCNC: 8 U/L (ref 1–33)
AST SERPL-CCNC: 14 U/L (ref 1–32)
BILIRUB SERPL-MCNC: 0.4 MG/DL (ref 0–1.2)
BUN SERPL-MCNC: 18 MG/DL (ref 8–23)
BUN/CREAT SERPL: 20.7 (ref 7–25)
CALCIUM SERPL-MCNC: 8.8 MG/DL (ref 8.6–10.5)
CHLORIDE SERPL-SCNC: 104 MMOL/L (ref 98–107)
CHOLEST SERPL-MCNC: 124 MG/DL (ref 0–200)
CO2 SERPL-SCNC: 29.9 MMOL/L (ref 22–29)
CREAT SERPL-MCNC: 0.87 MG/DL (ref 0.57–1)
ERYTHROCYTE [DISTWIDTH] IN BLOOD BY AUTOMATED COUNT: 18.6 % (ref 12.3–15.4)
GLOBULIN SER CALC-MCNC: 1.9 GM/DL
GLUCOSE SERPL-MCNC: 82 MG/DL (ref 65–99)
HCT VFR BLD AUTO: 31.7 % (ref 34–46.6)
HDLC SERPL-MCNC: 84 MG/DL (ref 40–60)
HGB BLD-MCNC: 10 G/DL (ref 12–15.9)
LDLC SERPL CALC-MCNC: 29 MG/DL (ref 0–100)
MCH RBC QN AUTO: 25.4 PG (ref 26.6–33)
MCHC RBC AUTO-ENTMCNC: 31.5 G/DL (ref 31.5–35.7)
MCV RBC AUTO: 80.7 FL (ref 79–97)
PLATELET # BLD AUTO: 144 10*3/MM3 (ref 140–450)
POTASSIUM SERPL-SCNC: 3.9 MMOL/L (ref 3.5–5.2)
PROT SERPL-MCNC: 5.7 G/DL (ref 6–8.5)
RBC # BLD AUTO: 3.93 10*6/MM3 (ref 3.77–5.28)
SODIUM SERPL-SCNC: 142 MMOL/L (ref 136–145)
TRIGL SERPL-MCNC: 43 MG/DL (ref 0–150)
VLDLC SERPL CALC-MCNC: 11 MG/DL (ref 5–40)
WBC # BLD AUTO: 2.89 10*3/MM3 (ref 3.4–10.8)

## 2020-11-04 ENCOUNTER — APPOINTMENT (OUTPATIENT)
Dept: CT IMAGING | Facility: HOSPITAL | Age: 70
End: 2020-11-04

## 2020-11-04 ENCOUNTER — HOSPITAL ENCOUNTER (INPATIENT)
Facility: HOSPITAL | Age: 70
LOS: 2 days | Discharge: HOME | End: 2020-11-06
Attending: STUDENT IN AN ORGANIZED HEALTH CARE EDUCATION/TRAINING PROGRAM | Admitting: INTERNAL MEDICINE

## 2020-11-04 ENCOUNTER — APPOINTMENT (OUTPATIENT)
Dept: GENERAL RADIOLOGY | Facility: HOSPITAL | Age: 70
End: 2020-11-04

## 2020-11-04 DIAGNOSIS — R51.9 ACUTE NONINTRACTABLE HEADACHE, UNSPECIFIED HEADACHE TYPE: ICD-10-CM

## 2020-11-04 DIAGNOSIS — I63.50 CEREBROVASCULAR ACCIDENT (CVA) DUE TO STENOSIS OF CEREBRAL ARTERY (HCC): Primary | ICD-10-CM

## 2020-11-04 PROBLEM — R29.898 LEFT ARM WEAKNESS: Status: ACTIVE | Noted: 2020-11-04

## 2020-11-04 LAB
6-ACETYL MORPHINE: NEGATIVE
ALBUMIN SERPL-MCNC: 3.78 G/DL (ref 3.5–5.2)
ALBUMIN/GLOB SERPL: 1.8 G/DL
ALP SERPL-CCNC: 99 U/L (ref 39–117)
ALT SERPL W P-5'-P-CCNC: 5 U/L (ref 1–33)
AMPHET+METHAMPHET UR QL: NEGATIVE
ANION GAP SERPL CALCULATED.3IONS-SCNC: 6.6 MMOL/L (ref 5–15)
ANISOCYTOSIS BLD QL: NORMAL
APTT PPP: 28.2 SECONDS (ref 25.6–35.3)
AST SERPL-CCNC: 12 U/L (ref 1–32)
BACTERIA UR QL AUTO: ABNORMAL /HPF
BARBITURATES UR QL SCN: NEGATIVE
BENZODIAZ UR QL SCN: NEGATIVE
BILIRUB SERPL-MCNC: 0.4 MG/DL (ref 0–1.2)
BUN SERPL-MCNC: 14 MG/DL (ref 8–23)
BUN/CREAT SERPL: 16.9 (ref 7–25)
BUPRENORPHINE SERPL-MCNC: NEGATIVE NG/ML
CALCIUM SPEC-SCNC: 9.2 MG/DL (ref 8.6–10.5)
CANNABINOIDS SERPL QL: NEGATIVE
CHLORIDE SERPL-SCNC: 103 MMOL/L (ref 98–107)
CO2 SERPL-SCNC: 27.4 MMOL/L (ref 22–29)
COCAINE UR QL: NEGATIVE
CREAT SERPL-MCNC: 0.83 MG/DL (ref 0.57–1)
CRP SERPL-MCNC: 0.11 MG/DL (ref 0–0.5)
D-LACTATE SERPL-SCNC: 1.3 MMOL/L (ref 0.5–2)
DEPRECATED RDW RBC AUTO: 63.9 FL (ref 37–54)
ELLIPTOCYTES BLD QL SMEAR: NORMAL
EOSINOPHIL # BLD MANUAL: 0.14 10*3/MM3 (ref 0–0.4)
EOSINOPHIL NFR BLD MANUAL: 3 % (ref 0.3–6.2)
ERYTHROCYTE [DISTWIDTH] IN BLOOD BY AUTOMATED COUNT: 20.5 % (ref 12.3–15.4)
ERYTHROCYTE [SEDIMENTATION RATE] IN BLOOD: 13 MM/HR (ref 0–30)
GFR SERPL CREATININE-BSD FRML MDRD: 68 ML/MIN/1.73
GLOBULIN UR ELPH-MCNC: 2.1 GM/DL
GLUCOSE BLDC GLUCOMTR-MCNC: 86 MG/DL (ref 70–130)
GLUCOSE SERPL-MCNC: 84 MG/DL (ref 65–99)
HCT VFR BLD AUTO: 34.5 % (ref 34–46.6)
HGB BLD-MCNC: 10.5 G/DL (ref 12–15.9)
HYALINE CASTS UR QL AUTO: ABNORMAL /LPF
HYPOCHROMIA BLD QL: NORMAL
INR PPP: 0.99 (ref 0.9–1.1)
LARGE PLATELETS: NORMAL
LYMPHOCYTES # BLD MANUAL: 0.98 10*3/MM3 (ref 0.7–3.1)
LYMPHOCYTES NFR BLD MANUAL: 21 % (ref 19.6–45.3)
LYMPHOCYTES NFR BLD MANUAL: 5 % (ref 5–12)
MAGNESIUM SERPL-MCNC: 2 MG/DL (ref 1.6–2.4)
MCH RBC QN AUTO: 26.3 PG (ref 26.6–33)
MCHC RBC AUTO-ENTMCNC: 30.4 G/DL (ref 31.5–35.7)
MCV RBC AUTO: 86.3 FL (ref 79–97)
METHADONE UR QL SCN: NEGATIVE
MONOCYTES # BLD AUTO: 0.23 10*3/MM3 (ref 0.1–0.9)
NEUTROPHILS # BLD AUTO: 3.32 10*3/MM3 (ref 1.7–7)
NEUTROPHILS NFR BLD MANUAL: 71 % (ref 42.7–76)
NT-PROBNP SERPL-MCNC: 1297 PG/ML (ref 0–900)
OPIATES UR QL: POSITIVE
OXYCODONE UR QL SCN: NEGATIVE
PCP UR QL SCN: NEGATIVE
PHOSPHATE SERPL-MCNC: 2.6 MG/DL (ref 2.5–4.5)
PLATELET # BLD AUTO: 163 10*3/MM3 (ref 140–450)
PMV BLD AUTO: 12.9 FL (ref 6–12)
POTASSIUM SERPL-SCNC: 3.6 MMOL/L (ref 3.5–5.2)
PROT SERPL-MCNC: 5.9 G/DL (ref 6–8.5)
PROTHROMBIN TIME: 12.9 SECONDS (ref 11.9–14.1)
RBC # BLD AUTO: 4 10*6/MM3 (ref 3.77–5.28)
RBC # UR: ABNORMAL /HPF
REF LAB TEST METHOD: ABNORMAL
SARS-COV-2 RDRP RESP QL NAA+PROBE: NOT DETECTED
SCAN SLIDE: NORMAL
SODIUM SERPL-SCNC: 137 MMOL/L (ref 136–145)
SQUAMOUS #/AREA URNS HPF: ABNORMAL /HPF
TROPONIN T SERPL-MCNC: <0.01 NG/ML (ref 0–0.03)
TROPONIN T SERPL-MCNC: <0.01 NG/ML (ref 0–0.03)
TSH SERPL DL<=0.05 MIU/L-ACNC: 1.77 UIU/ML (ref 0.27–4.2)
WBC # BLD AUTO: 4.67 10*3/MM3 (ref 3.4–10.8)
WBC UR QL AUTO: ABNORMAL /HPF

## 2020-11-04 PROCEDURE — 82962 GLUCOSE BLOOD TEST: CPT

## 2020-11-04 PROCEDURE — 25010000002 PROCHLORPERAZINE 10 MG/2ML SOLUTION: Performed by: STUDENT IN AN ORGANIZED HEALTH CARE EDUCATION/TRAINING PROGRAM

## 2020-11-04 PROCEDURE — 71045 X-RAY EXAM CHEST 1 VIEW: CPT | Performed by: RADIOLOGY

## 2020-11-04 PROCEDURE — 25010000002 IOPAMIDOL 61 % SOLUTION: Performed by: STUDENT IN AN ORGANIZED HEALTH CARE EDUCATION/TRAINING PROGRAM

## 2020-11-04 PROCEDURE — 84100 ASSAY OF PHOSPHORUS: CPT | Performed by: STUDENT IN AN ORGANIZED HEALTH CARE EDUCATION/TRAINING PROGRAM

## 2020-11-04 PROCEDURE — 87088 URINE BACTERIA CULTURE: CPT | Performed by: STUDENT IN AN ORGANIZED HEALTH CARE EDUCATION/TRAINING PROGRAM

## 2020-11-04 PROCEDURE — 80307 DRUG TEST PRSMV CHEM ANLYZR: CPT | Performed by: STUDENT IN AN ORGANIZED HEALTH CARE EDUCATION/TRAINING PROGRAM

## 2020-11-04 PROCEDURE — 85610 PROTHROMBIN TIME: CPT | Performed by: STUDENT IN AN ORGANIZED HEALTH CARE EDUCATION/TRAINING PROGRAM

## 2020-11-04 PROCEDURE — 84443 ASSAY THYROID STIM HORMONE: CPT | Performed by: STUDENT IN AN ORGANIZED HEALTH CARE EDUCATION/TRAINING PROGRAM

## 2020-11-04 PROCEDURE — 70450 CT HEAD/BRAIN W/O DYE: CPT

## 2020-11-04 PROCEDURE — 81001 URINALYSIS AUTO W/SCOPE: CPT | Performed by: STUDENT IN AN ORGANIZED HEALTH CARE EDUCATION/TRAINING PROGRAM

## 2020-11-04 PROCEDURE — 70498 CT ANGIOGRAPHY NECK: CPT | Performed by: RADIOLOGY

## 2020-11-04 PROCEDURE — 83605 ASSAY OF LACTIC ACID: CPT | Performed by: STUDENT IN AN ORGANIZED HEALTH CARE EDUCATION/TRAINING PROGRAM

## 2020-11-04 PROCEDURE — 99223 1ST HOSP IP/OBS HIGH 75: CPT | Performed by: STUDENT IN AN ORGANIZED HEALTH CARE EDUCATION/TRAINING PROGRAM

## 2020-11-04 PROCEDURE — 83735 ASSAY OF MAGNESIUM: CPT | Performed by: STUDENT IN AN ORGANIZED HEALTH CARE EDUCATION/TRAINING PROGRAM

## 2020-11-04 PROCEDURE — 70496 CT ANGIOGRAPHY HEAD: CPT | Performed by: RADIOLOGY

## 2020-11-04 PROCEDURE — 87186 SC STD MICRODIL/AGAR DIL: CPT | Performed by: STUDENT IN AN ORGANIZED HEALTH CARE EDUCATION/TRAINING PROGRAM

## 2020-11-04 PROCEDURE — 86140 C-REACTIVE PROTEIN: CPT | Performed by: STUDENT IN AN ORGANIZED HEALTH CARE EDUCATION/TRAINING PROGRAM

## 2020-11-04 PROCEDURE — 0042T CT CEREBRAL PERFUSION W WO CONTRAST: CPT | Performed by: RADIOLOGY

## 2020-11-04 PROCEDURE — 85025 COMPLETE CBC W/AUTO DIFF WBC: CPT | Performed by: STUDENT IN AN ORGANIZED HEALTH CARE EDUCATION/TRAINING PROGRAM

## 2020-11-04 PROCEDURE — 93010 ELECTROCARDIOGRAM REPORT: CPT | Performed by: INTERNAL MEDICINE

## 2020-11-04 PROCEDURE — 80053 COMPREHEN METABOLIC PANEL: CPT | Performed by: STUDENT IN AN ORGANIZED HEALTH CARE EDUCATION/TRAINING PROGRAM

## 2020-11-04 PROCEDURE — 25010000002 DIPHENHYDRAMINE PER 50 MG: Performed by: STUDENT IN AN ORGANIZED HEALTH CARE EDUCATION/TRAINING PROGRAM

## 2020-11-04 PROCEDURE — 84484 ASSAY OF TROPONIN QUANT: CPT | Performed by: STUDENT IN AN ORGANIZED HEALTH CARE EDUCATION/TRAINING PROGRAM

## 2020-11-04 PROCEDURE — 83880 ASSAY OF NATRIURETIC PEPTIDE: CPT | Performed by: STUDENT IN AN ORGANIZED HEALTH CARE EDUCATION/TRAINING PROGRAM

## 2020-11-04 PROCEDURE — 87086 URINE CULTURE/COLONY COUNT: CPT | Performed by: STUDENT IN AN ORGANIZED HEALTH CARE EDUCATION/TRAINING PROGRAM

## 2020-11-04 PROCEDURE — 70498 CT ANGIOGRAPHY NECK: CPT

## 2020-11-04 PROCEDURE — 85652 RBC SED RATE AUTOMATED: CPT | Performed by: STUDENT IN AN ORGANIZED HEALTH CARE EDUCATION/TRAINING PROGRAM

## 2020-11-04 PROCEDURE — 87635 SARS-COV-2 COVID-19 AMP PRB: CPT | Performed by: STUDENT IN AN ORGANIZED HEALTH CARE EDUCATION/TRAINING PROGRAM

## 2020-11-04 PROCEDURE — 36415 COLL VENOUS BLD VENIPUNCTURE: CPT

## 2020-11-04 PROCEDURE — 70496 CT ANGIOGRAPHY HEAD: CPT

## 2020-11-04 PROCEDURE — 71045 X-RAY EXAM CHEST 1 VIEW: CPT

## 2020-11-04 PROCEDURE — 87040 BLOOD CULTURE FOR BACTERIA: CPT | Performed by: STUDENT IN AN ORGANIZED HEALTH CARE EDUCATION/TRAINING PROGRAM

## 2020-11-04 PROCEDURE — 25010000002 MAGNESIUM SULFATE IN D5W 1G/100ML (PREMIX) 1-5 GM/100ML-% SOLUTION: Performed by: STUDENT IN AN ORGANIZED HEALTH CARE EDUCATION/TRAINING PROGRAM

## 2020-11-04 PROCEDURE — 99285 EMERGENCY DEPT VISIT HI MDM: CPT

## 2020-11-04 PROCEDURE — 85730 THROMBOPLASTIN TIME PARTIAL: CPT | Performed by: STUDENT IN AN ORGANIZED HEALTH CARE EDUCATION/TRAINING PROGRAM

## 2020-11-04 PROCEDURE — 93005 ELECTROCARDIOGRAM TRACING: CPT | Performed by: STUDENT IN AN ORGANIZED HEALTH CARE EDUCATION/TRAINING PROGRAM

## 2020-11-04 PROCEDURE — 93005 ELECTROCARDIOGRAM TRACING: CPT | Performed by: EMERGENCY MEDICINE

## 2020-11-04 PROCEDURE — 0042T HC CT CEREBRAL PERFUSION W/WO CONTRAST: CPT

## 2020-11-04 PROCEDURE — 85007 BL SMEAR W/DIFF WBC COUNT: CPT | Performed by: STUDENT IN AN ORGANIZED HEALTH CARE EDUCATION/TRAINING PROGRAM

## 2020-11-04 RX ORDER — CLOPIDOGREL BISULFATE 75 MG/1
75 TABLET ORAL ONCE
Status: COMPLETED | OUTPATIENT
Start: 2020-11-04 | End: 2020-11-04

## 2020-11-04 RX ORDER — HYDROCODONE BITARTRATE AND ACETAMINOPHEN 10; 325 MG/1; MG/1
1 TABLET ORAL 2 TIMES DAILY PRN
Status: CANCELLED | OUTPATIENT
Start: 2020-11-04

## 2020-11-04 RX ORDER — GABAPENTIN 600 MG/1
600 TABLET ORAL 3 TIMES DAILY PRN
COMMUNITY

## 2020-11-04 RX ORDER — GABAPENTIN 300 MG/1
600 CAPSULE ORAL 3 TIMES DAILY PRN
Status: CANCELLED | OUTPATIENT
Start: 2020-11-04

## 2020-11-04 RX ORDER — PROCHLORPERAZINE EDISYLATE 5 MG/ML
10 INJECTION INTRAMUSCULAR; INTRAVENOUS EVERY 6 HOURS PRN
Status: DISCONTINUED | OUTPATIENT
Start: 2020-11-04 | End: 2020-11-06 | Stop reason: HOSPADM

## 2020-11-04 RX ORDER — ATORVASTATIN CALCIUM 20 MG/1
20 TABLET, FILM COATED ORAL DAILY
COMMUNITY

## 2020-11-04 RX ORDER — CYCLOBENZAPRINE HCL 10 MG
10 TABLET ORAL NIGHTLY
Status: CANCELLED | OUTPATIENT
Start: 2020-11-05

## 2020-11-04 RX ORDER — ASPIRIN 81 MG/1
81 TABLET ORAL DAILY
Status: CANCELLED | OUTPATIENT
Start: 2020-11-05

## 2020-11-04 RX ORDER — CETIRIZINE HYDROCHLORIDE 10 MG/1
10 TABLET ORAL DAILY
Status: CANCELLED | OUTPATIENT
Start: 2020-11-05

## 2020-11-04 RX ORDER — SODIUM CHLORIDE 0.9 % (FLUSH) 0.9 %
10 SYRINGE (ML) INJECTION AS NEEDED
Status: DISCONTINUED | OUTPATIENT
Start: 2020-11-04 | End: 2020-11-06 | Stop reason: HOSPADM

## 2020-11-04 RX ORDER — ATORVASTATIN CALCIUM 20 MG/1
20 TABLET, FILM COATED ORAL DAILY
Status: CANCELLED | OUTPATIENT
Start: 2020-11-05

## 2020-11-04 RX ORDER — DIPHENHYDRAMINE HYDROCHLORIDE 50 MG/ML
25 INJECTION INTRAMUSCULAR; INTRAVENOUS ONCE
Status: COMPLETED | OUTPATIENT
Start: 2020-11-04 | End: 2020-11-04

## 2020-11-04 RX ORDER — AZELASTINE 1 MG/ML
2 SPRAY, METERED NASAL AS NEEDED
Status: CANCELLED | OUTPATIENT
Start: 2020-11-04

## 2020-11-04 RX ORDER — ASPIRIN 81 MG/1
324 TABLET, CHEWABLE ORAL ONCE
Status: COMPLETED | OUTPATIENT
Start: 2020-11-04 | End: 2020-11-04

## 2020-11-04 RX ORDER — MAGNESIUM SULFATE 1 G/100ML
1 INJECTION INTRAVENOUS ONCE
Status: COMPLETED | OUTPATIENT
Start: 2020-11-04 | End: 2020-11-04

## 2020-11-04 RX ORDER — PANTOPRAZOLE SODIUM 40 MG/1
40 TABLET, DELAYED RELEASE ORAL DAILY
Status: CANCELLED | OUTPATIENT
Start: 2020-11-05

## 2020-11-04 RX ADMIN — MAGNESIUM SULFATE IN DEXTROSE 1 G: 10 INJECTION, SOLUTION INTRAVENOUS at 22:27

## 2020-11-04 RX ADMIN — IOPAMIDOL 100 ML: 612 INJECTION, SOLUTION INTRAVENOUS at 19:37

## 2020-11-04 RX ADMIN — PROCHLORPERAZINE EDISYLATE 10 MG: 5 INJECTION INTRAMUSCULAR; INTRAVENOUS at 21:50

## 2020-11-04 RX ADMIN — DIPHENHYDRAMINE HYDROCHLORIDE 25 MG: 50 INJECTION, SOLUTION INTRAMUSCULAR; INTRAVENOUS at 21:01

## 2020-11-04 RX ADMIN — CLOPIDOGREL 75 MG: 75 TABLET, FILM COATED ORAL at 21:00

## 2020-11-04 RX ADMIN — ASPIRIN 324 MG: 81 TABLET, CHEWABLE ORAL at 19:49

## 2020-11-04 NOTE — ED PROVIDER NOTES
Subjective   History of Present Illness  This 70-year-old female with a past medical history of COPD, coronary artery disease, hypertension, history of MI, history of TIA with left-sided symptoms more emergency department related ongoing chest pain and left-sided weakness that began at approximately 2 AM this morning.  The patient said that she woke up out of bed and at that time she began to experience chest pain.  She made it to the bathroom although she was short of breath and when she looked in the mirror she noticed that her lips were swollen than the left side of her mouth was drooping.  She was unable to smile.  She is also having some worsening blurred vision in her left eye at that time.  She was not having any confusion.  She had no recent trauma to the head.  She has been taking all of her medications as prescribed.  Rapid today the patient decided to make an appointment with her primary care physician.  When she presented to her primary care physician the physician appropriately made the patient seen under the emergency on her evaluation.  After I evaluated the patient initially and she told me about her strokelike symptoms I immediately called a code stroke.  Telemetry neurology was consulted.  There is some significant atherosclerosis affecting her left side.  Her chest pain is ongoing but less now.  She is getting aspirin in the emergency department  Review of Systems   Constitutional: Negative.    HENT: Negative.    Eyes: Negative.    Respiratory: Positive for shortness of breath.    Cardiovascular: Positive for chest pain.   Gastrointestinal: Negative.    Endocrine: Negative.    Genitourinary: Negative.    Musculoskeletal: Negative.    Skin: Negative.    Allergic/Immunologic: Negative.    Neurological: Negative.         Left facial weakness, left blurry eye vision, left upper extremity weakness.   Hematological: Negative.    Psychiatric/Behavioral: Negative.        Past Medical History:   Diagnosis  Date   • Arthritis    • Chronic bronchitis (CMS/HCC)    • Coronary artery disease    • Diastolic CHF, chronic (CMS/HCC) 11/5/2020   • GERD (gastroesophageal reflux disease)    • History of CVA (cerebrovascular accident)     right sided; 2005   • History of gastric ulcer    • Hyperlipidemia    • Hypertension    • Myocardial infarction (CMS/HCC)    • Normocytic anemia        Allergies   Allergen Reactions   • Sulfa Antibiotics Anaphylaxis       Past Surgical History:   Procedure Laterality Date   • ABDOMINAL SURGERY     • BRAVO PROCEDURE N/A 9/16/2019    Procedure: ESOPHAGOGASTRODUODENOSCOPY AND FRIEDMAN;  Surgeon: Neil Balderrama MD;  Location: [facility] OR;  Service: Gastroenterology   • CARDIAC CATHETERIZATION     • CARDIAC CATHETERIZATION N/A 12/17/2018    Procedure: Left Heart Cath;  Surgeon: Sandip Zamora IV, MD;  Location: [facility] CATH INVASIVE LOCATION;  Service: Cardiovascular   • CHOLECYSTECTOMY      laparoscopic   • COLONOSCOPY  2014   • ENDOSCOPY     • ENDOSCOPY N/A 11/11/2019    Procedure: ESOPHAGOGASTRODUODENOSCOPY WITH  DILATATION WITH FLUOROSCOPY;  Surgeon: Neil Balderrama MD;  Location: [facility] OR;  Service: Gastroenterology   • FRACTURE SURGERY     • HYSTERECTOMY      benign   • OTHER SURGICAL HISTORY      leg repair   • STOMACH SURGERY         Family History   Problem Relation Age of Onset   • Diabetes Other    • Hypertension Other    • Ovarian cancer Other    • Cancer Mother         bladder   • Cancer Sister         liver   • Diabetes Sister    • Diabetes Brother    • No Known Problems Father    • Breast cancer Neg Hx        Social History     Socioeconomic History   • Marital status:      Spouse name: Not on file   • Number of children: Not on file   • Years of education: Not on file   • Highest education level: Not on file   Tobacco Use   • Smoking status: Never Smoker   • Smokeless tobacco: Never Used   Substance and Sexual Activity   • Alcohol use: No   • Drug use: No   • Sexual  activity: Defer           Objective   Physical Exam  Vitals signs and nursing note reviewed. Exam conducted with a chaperone present.   Constitutional:       Appearance: Normal appearance.   HENT:      Head: Normocephalic and atraumatic.      Right Ear: External ear normal.      Left Ear: External ear normal.      Nose: Nose normal.      Mouth/Throat:      Mouth: Mucous membranes are moist.      Pharynx: Oropharynx is clear.   Eyes:      Extraocular Movements: Extraocular movements intact.      Pupils: Pupils are equal, round, and reactive to light.   Neck:      Musculoskeletal: Normal range of motion and neck supple.   Cardiovascular:      Rate and Rhythm: Normal rate and regular rhythm.      Pulses: Normal pulses.      Heart sounds: Normal heart sounds.   Pulmonary:      Effort: Pulmonary effort is normal.      Breath sounds: Normal breath sounds.   Abdominal:      General: Abdomen is flat. Bowel sounds are normal.      Palpations: Abdomen is soft.   Musculoskeletal: Normal range of motion.   Skin:     General: Skin is warm and dry.      Capillary Refill: Capillary refill takes less than 2 seconds.   Neurological:      General: No focal deficit present.      Mental Status: She is alert and oriented to person, place, and time.      GCS: GCS eye subscore is 4. GCS verbal subscore is 5. GCS motor subscore is 6.      Cranial Nerves: Facial asymmetry present. No dysarthria.      Sensory: Sensation is intact.      Motor: Motor function is intact.      Coordination: Coordination is intact.      Gait: Gait is intact.      Deep Tendon Reflexes: Reflexes are normal and symmetric.      Comments: Patient is having some drooping of the left side of her mouth.  Visual fields are intact to confrontation however she says that her blurry vision is much more blurry in the left side when she covers her right eye.   Psychiatric:         Mood and Affect: Mood normal.         Behavior: Behavior normal.         Thought Content: Thought  content normal.         Judgment: Judgment normal.         Procedures           ED Course  ED Course as of Nov 07 1643   Wed Nov 04, 2020 2040 I discussed the case with neurology after he has had his evaluation.  He recommends that the patient be admitted for observation and stroke order set.  He recommends giving 1 dose of Plavix 75 mg right now she has already received aspirin.  He also recommends treating her left-sided headache with a migraine cocktail that consists of Benadryl 25 mg Compazine 10 mg, magnesium 1 g IV.  He also is concerned that she is having cooccurring medical issues related to her chest pressure.    [JM]   2057 After Gore is agreed to admit the patient.  No further recommendations at this time.    [JM]   Thu Nov 05, 2020   0423 EKG interpretation, ventricular rate 62, , QRS 94, QTc 466.  No acute ST elevation or depression.    [JM]      ED Course User Index  [JM] Denny Fine, DO                                           MDM  Number of Diagnoses or Management Options  Acute nonintractable headache, unspecified headache type: new and requires workup  Cerebrovascular accident (CVA) due to stenosis of cerebral artery (CMS/HCC): new and requires workup  Diagnosis management comments: It is uncertain at this time whether or not the patient is having a stroke.  She will need further evaluation by neurology and further imaging.       Amount and/or Complexity of Data Reviewed  Clinical lab tests: reviewed and ordered  Tests in the radiology section of CPT®: reviewed and ordered  Tests in the medicine section of CPT®: reviewed and ordered  Decide to obtain previous medical records or to obtain history from someone other than the patient: yes  Obtain history from someone other than the patient: yes  Review and summarize past medical records: yes  Discuss the patient with other providers: yes  Independent visualization of images, tracings, or specimens: yes    Risk of Complications,  Morbidity, and/or Mortality  Presenting problems: moderate  Diagnostic procedures: moderate  Management options: moderate    Critical Care  Total time providing critical care: < 30 minutes    Patient Progress  Patient progress: stable      Final diagnoses:   Cerebrovascular accident (CVA) due to stenosis of cerebral artery (CMS/[facility])   Acute nonintractable headache, unspecified headache type            Denny Fine DO  11/07/20 3320

## 2020-11-05 ENCOUNTER — APPOINTMENT (OUTPATIENT)
Dept: MRI IMAGING | Facility: HOSPITAL | Age: 70
End: 2020-11-05

## 2020-11-05 ENCOUNTER — APPOINTMENT (OUTPATIENT)
Dept: CT IMAGING | Facility: HOSPITAL | Age: 70
End: 2020-11-05

## 2020-11-05 PROBLEM — Z87.11 HISTORY OF GASTRIC ULCER: Chronic | Status: ACTIVE | Noted: 2020-11-05

## 2020-11-05 PROBLEM — I50.32 DIASTOLIC CHF, CHRONIC (HCC): Chronic | Status: ACTIVE | Noted: 2020-11-05

## 2020-11-05 PROBLEM — I10 ESSENTIAL HYPERTENSION: Chronic | Status: ACTIVE | Noted: 2018-11-05

## 2020-11-05 PROBLEM — K22.0 ACHALASIA: Status: RESOLVED | Noted: 2019-11-08 | Resolved: 2020-11-05

## 2020-11-05 PROBLEM — Z98.890 HISTORY OF NISSEN FUNDOPLICATION: Status: RESOLVED | Noted: 2019-08-30 | Resolved: 2020-11-05

## 2020-11-05 PROBLEM — Z80.41 FAMILY HISTORY OF OVARIAN CANCER: Status: RESOLVED | Noted: 2018-05-26 | Resolved: 2020-11-05

## 2020-11-05 PROBLEM — R12 HEARTBURN: Status: RESOLVED | Noted: 2019-08-30 | Resolved: 2020-11-05

## 2020-11-05 PROBLEM — N63.0 BREAST MASS: Status: RESOLVED | Noted: 2018-05-26 | Resolved: 2020-11-05

## 2020-11-05 PROBLEM — E78.5 HYPERLIPIDEMIA LDL GOAL <70: Chronic | Status: ACTIVE | Noted: 2018-12-17

## 2020-11-05 PROBLEM — Z86.73 HISTORY OF CVA (CEREBROVASCULAR ACCIDENT): Status: ACTIVE | Noted: 2020-11-05

## 2020-11-05 PROBLEM — I25.119 CORONARY ARTERY DISEASE INVOLVING NATIVE CORONARY ARTERY OF NATIVE HEART WITH ANGINA PECTORIS: Chronic | Status: ACTIVE | Noted: 2018-12-03

## 2020-11-05 PROBLEM — R47.02 DYSPHASIA: Status: RESOLVED | Noted: 2019-08-30 | Resolved: 2020-11-05

## 2020-11-05 PROBLEM — Z86.73 HISTORY OF CVA (CEREBROVASCULAR ACCIDENT): Chronic | Status: ACTIVE | Noted: 2020-11-05

## 2020-11-05 LAB
ALBUMIN SERPL-MCNC: 3.15 G/DL (ref 3.5–5.2)
ALBUMIN/GLOB SERPL: 1.7 G/DL
ALP SERPL-CCNC: 87 U/L (ref 39–117)
ALT SERPL W P-5'-P-CCNC: 5 U/L (ref 1–33)
ANION GAP SERPL CALCULATED.3IONS-SCNC: 6.7 MMOL/L (ref 5–15)
AST SERPL-CCNC: 12 U/L (ref 1–32)
BASOPHILS # BLD AUTO: 0.06 10*3/MM3 (ref 0–0.2)
BASOPHILS NFR BLD AUTO: 1.8 % (ref 0–1.5)
BILIRUB SERPL-MCNC: 0.4 MG/DL (ref 0–1.2)
BILIRUB UR QL STRIP: NEGATIVE
BUN SERPL-MCNC: 12 MG/DL (ref 8–23)
BUN/CREAT SERPL: 15 (ref 7–25)
CALCIUM SPEC-SCNC: 8.8 MG/DL (ref 8.6–10.5)
CHLORIDE SERPL-SCNC: 104 MMOL/L (ref 98–107)
CLARITY UR: CLEAR
CO2 SERPL-SCNC: 26.3 MMOL/L (ref 22–29)
COLOR UR: YELLOW
CREAT SERPL-MCNC: 0.8 MG/DL (ref 0.57–1)
DEPRECATED RDW RBC AUTO: 65 FL (ref 37–54)
EOSINOPHIL # BLD AUTO: 0.08 10*3/MM3 (ref 0–0.4)
EOSINOPHIL NFR BLD AUTO: 2.4 % (ref 0.3–6.2)
ERYTHROCYTE [DISTWIDTH] IN BLOOD BY AUTOMATED COUNT: 20.3 % (ref 12.3–15.4)
GFR SERPL CREATININE-BSD FRML MDRD: 71 ML/MIN/1.73
GLOBULIN UR ELPH-MCNC: 1.9 GM/DL
GLUCOSE SERPL-MCNC: 93 MG/DL (ref 65–99)
GLUCOSE UR STRIP-MCNC: NEGATIVE MG/DL
HCT VFR BLD AUTO: 30.5 % (ref 34–46.6)
HGB BLD-MCNC: 8.8 G/DL (ref 12–15.9)
HGB UR QL STRIP.AUTO: NEGATIVE
IMM GRANULOCYTES # BLD AUTO: 0.03 10*3/MM3 (ref 0–0.05)
IMM GRANULOCYTES NFR BLD AUTO: 0.9 % (ref 0–0.5)
KETONES UR QL STRIP: NEGATIVE
LEUKOCYTE ESTERASE UR QL STRIP.AUTO: NEGATIVE
LYMPHOCYTES # BLD AUTO: 1.09 10*3/MM3 (ref 0.7–3.1)
LYMPHOCYTES NFR BLD AUTO: 32.3 % (ref 19.6–45.3)
MAGNESIUM SERPL-MCNC: 2.1 MG/DL (ref 1.6–2.4)
MCH RBC QN AUTO: 25.4 PG (ref 26.6–33)
MCHC RBC AUTO-ENTMCNC: 29.1 G/DL (ref 31.5–35.7)
MCV RBC AUTO: 88.2 FL (ref 79–97)
MONOCYTES # BLD AUTO: 0.25 10*3/MM3 (ref 0.1–0.9)
MONOCYTES NFR BLD AUTO: 7.4 % (ref 5–12)
NEUTROPHILS NFR BLD AUTO: 1.86 10*3/MM3 (ref 1.7–7)
NEUTROPHILS NFR BLD AUTO: 55.2 % (ref 42.7–76)
NITRITE UR QL STRIP: POSITIVE
NRBC BLD AUTO-RTO: 0 /100 WBC (ref 0–0.2)
PH UR STRIP.AUTO: 7 [PH] (ref 5–8)
PHOSPHATE SERPL-MCNC: 2.8 MG/DL (ref 2.5–4.5)
PLATELET # BLD AUTO: 135 10*3/MM3 (ref 140–450)
PMV BLD AUTO: 12.7 FL (ref 6–12)
POTASSIUM SERPL-SCNC: 3.3 MMOL/L (ref 3.5–5.2)
PROT SERPL-MCNC: 5 G/DL (ref 6–8.5)
PROT UR QL STRIP: NEGATIVE
QT INTERVAL: 408 MS
QT INTERVAL: 442 MS
QT INTERVAL: 460 MS
QT INTERVAL: 462 MS
QTC INTERVAL: 452 MS
QTC INTERVAL: 452 MS
QTC INTERVAL: 466 MS
QTC INTERVAL: 484 MS
RBC # BLD AUTO: 3.46 10*6/MM3 (ref 3.77–5.28)
SODIUM SERPL-SCNC: 137 MMOL/L (ref 136–145)
SP GR UR STRIP: >1.03 (ref 1–1.03)
TROPONIN T SERPL-MCNC: <0.01 NG/ML (ref 0–0.03)
UROBILINOGEN UR QL STRIP: ABNORMAL
WBC # BLD AUTO: 3.37 10*3/MM3 (ref 3.4–10.8)

## 2020-11-05 PROCEDURE — 71250 CT THORAX DX C-: CPT | Performed by: RADIOLOGY

## 2020-11-05 PROCEDURE — 70543 MRI ORBT/FAC/NCK W/O &W/DYE: CPT | Performed by: RADIOLOGY

## 2020-11-05 PROCEDURE — 84484 ASSAY OF TROPONIN QUANT: CPT | Performed by: PHYSICIAN ASSISTANT

## 2020-11-05 PROCEDURE — 70553 MRI BRAIN STEM W/O & W/DYE: CPT | Performed by: RADIOLOGY

## 2020-11-05 PROCEDURE — 70553 MRI BRAIN STEM W/O & W/DYE: CPT

## 2020-11-05 PROCEDURE — A9577 INJ MULTIHANCE: HCPCS | Performed by: STUDENT IN AN ORGANIZED HEALTH CARE EDUCATION/TRAINING PROGRAM

## 2020-11-05 PROCEDURE — 85025 COMPLETE CBC W/AUTO DIFF WBC: CPT | Performed by: PHYSICIAN ASSISTANT

## 2020-11-05 PROCEDURE — 0 GADOBENATE DIMEGLUMINE 529 MG/ML SOLUTION: Performed by: STUDENT IN AN ORGANIZED HEALTH CARE EDUCATION/TRAINING PROGRAM

## 2020-11-05 PROCEDURE — 80053 COMPREHEN METABOLIC PANEL: CPT | Performed by: PHYSICIAN ASSISTANT

## 2020-11-05 PROCEDURE — 84100 ASSAY OF PHOSPHORUS: CPT | Performed by: PHYSICIAN ASSISTANT

## 2020-11-05 PROCEDURE — 71250 CT THORAX DX C-: CPT

## 2020-11-05 PROCEDURE — 99223 1ST HOSP IP/OBS HIGH 75: CPT | Performed by: PHYSICIAN ASSISTANT

## 2020-11-05 PROCEDURE — 93005 ELECTROCARDIOGRAM TRACING: CPT | Performed by: PHYSICIAN ASSISTANT

## 2020-11-05 PROCEDURE — 25010000002 ENOXAPARIN PER 10 MG: Performed by: INTERNAL MEDICINE

## 2020-11-05 PROCEDURE — 83735 ASSAY OF MAGNESIUM: CPT | Performed by: PHYSICIAN ASSISTANT

## 2020-11-05 PROCEDURE — 93010 ELECTROCARDIOGRAM REPORT: CPT | Performed by: INTERNAL MEDICINE

## 2020-11-05 PROCEDURE — 70543 MRI ORBT/FAC/NCK W/O &W/DYE: CPT

## 2020-11-05 RX ORDER — PANTOPRAZOLE SODIUM 40 MG/1
40 TABLET, DELAYED RELEASE ORAL
Status: DISCONTINUED | OUTPATIENT
Start: 2020-11-05 | End: 2020-11-06 | Stop reason: HOSPADM

## 2020-11-05 RX ORDER — BUTALBITAL, ACETAMINOPHEN AND CAFFEINE 50; 325; 40 MG/1; MG/1; MG/1
1 TABLET ORAL ONCE
Status: COMPLETED | OUTPATIENT
Start: 2020-11-05 | End: 2020-11-05

## 2020-11-05 RX ORDER — CLOPIDOGREL BISULFATE 75 MG/1
75 TABLET ORAL DAILY
Status: DISCONTINUED | OUTPATIENT
Start: 2020-11-05 | End: 2020-11-06 | Stop reason: HOSPADM

## 2020-11-05 RX ORDER — SODIUM CHLORIDE 0.9 % (FLUSH) 0.9 %
10 SYRINGE (ML) INJECTION AS NEEDED
Status: DISCONTINUED | OUTPATIENT
Start: 2020-11-05 | End: 2020-11-06 | Stop reason: HOSPADM

## 2020-11-05 RX ORDER — CETIRIZINE HYDROCHLORIDE 10 MG/1
10 TABLET ORAL DAILY
Status: DISCONTINUED | OUTPATIENT
Start: 2020-11-05 | End: 2020-11-06 | Stop reason: HOSPADM

## 2020-11-05 RX ORDER — ATORVASTATIN CALCIUM 20 MG/1
20 TABLET, FILM COATED ORAL DAILY
Status: DISCONTINUED | OUTPATIENT
Start: 2020-11-05 | End: 2020-11-06 | Stop reason: HOSPADM

## 2020-11-05 RX ORDER — NITROGLYCERIN 0.4 MG/1
0.4 TABLET SUBLINGUAL
Status: DISCONTINUED | OUTPATIENT
Start: 2020-11-05 | End: 2020-11-06 | Stop reason: HOSPADM

## 2020-11-05 RX ORDER — POTASSIUM CHLORIDE 20 MEQ/1
40 TABLET, EXTENDED RELEASE ORAL ONCE
Status: COMPLETED | OUTPATIENT
Start: 2020-11-05 | End: 2020-11-05

## 2020-11-05 RX ORDER — ASPIRIN 81 MG/1
81 TABLET ORAL DAILY
Status: DISCONTINUED | OUTPATIENT
Start: 2020-11-05 | End: 2020-11-06 | Stop reason: HOSPADM

## 2020-11-05 RX ORDER — ACETAMINOPHEN 325 MG/1
650 TABLET ORAL EVERY 6 HOURS PRN
Status: DISCONTINUED | OUTPATIENT
Start: 2020-11-05 | End: 2020-11-06 | Stop reason: HOSPADM

## 2020-11-05 RX ORDER — SODIUM CHLORIDE 0.9 % (FLUSH) 0.9 %
10 SYRINGE (ML) INJECTION EVERY 12 HOURS SCHEDULED
Status: DISCONTINUED | OUTPATIENT
Start: 2020-11-05 | End: 2020-11-06 | Stop reason: HOSPADM

## 2020-11-05 RX ADMIN — SODIUM CHLORIDE, PRESERVATIVE FREE 10 ML: 5 INJECTION INTRAVENOUS at 20:12

## 2020-11-05 RX ADMIN — POTASSIUM CHLORIDE 40 MEQ: 20 TABLET, EXTENDED RELEASE ORAL at 20:12

## 2020-11-05 RX ADMIN — ENOXAPARIN SODIUM 40 MG: 40 INJECTION SUBCUTANEOUS at 10:27

## 2020-11-05 RX ADMIN — CETIRIZINE HYDROCHLORIDE 10 MG: 10 TABLET, FILM COATED ORAL at 20:12

## 2020-11-05 RX ADMIN — GADOBENATE DIMEGLUMINE 18 ML: 529 INJECTION, SOLUTION INTRAVENOUS at 17:15

## 2020-11-05 RX ADMIN — BUTALBITAL, ACETAMINOPHEN, AND CAFFEINE 1 TABLET: 50; 325; 40 TABLET ORAL at 16:03

## 2020-11-05 RX ADMIN — SODIUM CHLORIDE, PRESERVATIVE FREE 10 ML: 5 INJECTION INTRAVENOUS at 14:08

## 2020-11-05 RX ADMIN — ATORVASTATIN CALCIUM 20 MG: 20 TABLET, FILM COATED ORAL at 20:12

## 2020-11-05 RX ADMIN — CLOPIDOGREL 75 MG: 75 TABLET, FILM COATED ORAL at 14:07

## 2020-11-05 RX ADMIN — ASPIRIN 81 MG: 81 TABLET, COATED ORAL at 14:07

## 2020-11-05 NOTE — H&P
"     [facility] Medicine Services  HISTORY & PHYSICAL    Patient Identification:  Name:  Lyla Vazquez  Age:  70 y.o.  Sex:  female  :  1950  MRN:  [id number]   Visit Number:  [id number]  Admit Date: 2020   Primary Care Physician:  Joyce Braden MD     Subjective     Chief complaint:   Chief Complaint   Patient presents with   • Chest Pain   • Facial Swelling     History of presenting illness:   Patient is a 70 y.o. female with past medical history significant for history of TIA, chronic normocytic anemia, chronic diastolic CHF, essential hypertension, GERD, history of gastric ulcer, hyperlipidemia, nonobstructive coronary artery disease, that presented to the [facility] emergency department for evaluation of chest pain and angioedema.     The patient states that she initially began experiencing a substernal chest tightness/heaviness around 2 PM yesterday evening while she was driving to work.  She reports that the pain originated in her left armpit and then radiated to her chest substernally.  She states that she also began noticing some angioedema of her lips and left eye.  As result, she decided to go to her primary care provider, Dr. Braden, to be evaluated.  She states that her chest pain was persistent and nothing seemed to make it better or worse.  She denies noticing any diaphoresis, palpitations, shortness of breath, or nausea associated with the chest pain.  She reports that when she arrived to her PCPs office he noticed some mild left-sided facial drooping and weakness in her left eye so he recommended she come to the emergency department to be further evaluated.  She denies any possibility of an allergic reaction and denies any new medications, different foods, or new cosmetics.  She denies noticing any slurring of speech, lightheadedness, numbness, or tremors.  She states that her left arm did feel slightly more weak but states that simply felt \"tired.\"  " She further denies any recent illness, fever, chills, congestion, sore throat, coughing, wheezing, leg edema, abdominal pain, nausea, vomiting, diarrhea, dysuria, malodorous urine, difficulty with ambulation, confusion.  She states that she has been experiencing severe headaches for around 3-1/2 to 4 months.  She states that these headaches typically occur on the left side of her head and feel like a heavy pressure.  She states that when she is experiencing these headaches she does become dizzy.  She states that lights and/or sound do not seem to make the headache any worse.  She denies taking any Tylenol or ibuprofen at home for the pain.  She further denies any neck stiffness, erythema, or edema.  The patient does report a right-sided CVA around 15 years ago and states that she does have some chronic left-sided residual weakness.    Upon arrival to the ED, vitals were temperature 98.9 °F, pulse 80, respirations 16, /70, SPO2 98% on room air.  Troponin T negative x2.  proBNP 1297.0.  CMP with total protein 5.9, otherwise unremarkable.  CBC with hemoglobin 10.5, MCH 26.3, MCHC 30.4, MPV 12.9.  UA with specific gravity greater than 1.030, positive nitrites, 4+ bacteria.  Blood cultures pending x2.  UDS positive for opiates.  Chest x-ray shows no acute cardiopulmonary findings.  CT angiogram of the carotid shows no significant stenosis or occlusion.  CT angiogram of the head shows no large vessel occlusion, moderate short segment stenosis left supraclinoid ICA with characterization limited by extensive dense calcified plaque.  CT of the head without contrast shows no acute intracranial abnormality.  CT cerebral perfusion with and without contrast shows a normal CT perfusion.  Initial EKG with normal sinus rhythm, poor R wave progression, heart rate 74 bpm, QTc 462 MS.  COVID-19 negative.    In the emergency department the patient received p.o. aspirin 324 mg, p.o. Plavix 75 mg, IV Benadryl 25 mg, IV magnesium  "sulfate 1 g.    Patient has been admitted to the telemetry floor for further evaluation and treatment  ---------------------------------------------------------------------------------------------------------------------   Review of Systems   Constitutional: Negative for chills, diaphoresis and fever.   HENT: Positive for facial swelling (mouth and left eye). Negative for congestion and sore throat.    Eyes: Negative for discharge and visual disturbance.        Left eye edema   Respiratory: Negative for cough, shortness of breath and wheezing.    Cardiovascular: Positive for chest pain (substernal pressure). Negative for palpitations and leg swelling.   Gastrointestinal: Negative for abdominal pain, constipation, diarrhea, nausea and vomiting.   Endocrine: Negative for polydipsia and polyuria.   Genitourinary: Negative for dysuria and frequency.   Musculoskeletal: Negative for back pain and gait problem.   Skin: Negative for rash and wound.   Neurological: Positive for dizziness, facial asymmetry (left mouth ), weakness (left arm) and headaches (3.5-4 months; left sided \"pressure\"). Negative for tremors, syncope, speech difficulty, light-headedness and numbness.   Hematological: Does not bruise/bleed easily.   Psychiatric/Behavioral: Negative for confusion. The patient is not nervous/anxious.       ---------------------------------------------------------------------------------------------------------------------   Past Medical History:   Diagnosis Date   • Arthritis    • Chronic bronchitis (CMS/HCC)    • Coronary artery disease    • Diastolic CHF, chronic (CMS/HCC) 11/5/2020   • GERD (gastroesophageal reflux disease)    • History of gastric ulcer    • History of TIA (transient ischemic attack)    • Hyperlipidemia    • Hypertension    • Myocardial infarction (CMS/HCC)    • Normocytic anemia    • Numbness      Past Surgical History:   Procedure Laterality Date   • ABDOMINAL SURGERY     • BRAVO PROCEDURE N/A " 9/16/2019    Procedure: ESOPHAGOGASTRODUODENOSCOPY AND FRIEDMAN;  Surgeon: Neil Balderrama MD;  Location:  [facility];  Service: Gastroenterology   • CARDIAC CATHETERIZATION     • CARDIAC CATHETERIZATION N/A 12/17/2018    Procedure: Left Heart Cath;  Surgeon: Sandip Zamora IV, MD;  Location:  [facility];  Service: Cardiovascular   • CHOLECYSTECTOMY      laparoscopic   • COLONOSCOPY  2014   • ENDOSCOPY     • ENDOSCOPY N/A 11/11/2019    Procedure: ESOPHAGOGASTRODUODENOSCOPY WITH  DILATATION WITH FLUOROSCOPY;  Surgeon: Neil Balderrama MD;  Location:  [facility];  Service: Gastroenterology   • FRACTURE SURGERY     • HYSTERECTOMY      benign   • OTHER SURGICAL HISTORY      leg repair   • STOMACH SURGERY       Family History   Problem Relation Age of Onset   • Diabetes Other    • Hypertension Other    • Ovarian cancer Other    • Cancer Mother         bladder   • Cancer Sister         liver   • Diabetes Sister    • Diabetes Brother    • No Known Problems Father    • Breast cancer Neg Hx      Social History     Socioeconomic History   • Marital status:      Spouse name: Not on file   • Number of children: Not on file   • Years of education: Not on file   • Highest education level: Not on file   Tobacco Use   • Smoking status: Never Smoker   • Smokeless tobacco: Never Used   Substance and Sexual Activity   • Alcohol use: No   • Drug use: No   • Sexual activity: Defer     ---------------------------------------------------------------------------------------------------------------------   Allergies:  Sulfa antibiotics  ---------------------------------------------------------------------------------------------------------------------   Medications below are reported home medications pulling from within the system; at this time, these medications have not been reconciled unless otherwise specified and are in the verification process for further verifcation as current home  medications.    Prior to Admission Medications     Prescriptions Last Dose Informant Patient Reported? Taking?    aspirin (aspirin) 81 MG EC tablet 11/4/2020 Self No Yes    Take 1 tablet by mouth Daily.    atorvastatin (LIPITOR) 20 MG tablet 11/4/2020 Self Yes Yes    Take 20 mg by mouth Daily.    azelastine (ASTELIN) 0.1 % nasal spray 11/4/2020 Self Yes Yes    2 sprays into the nostril(s) as directed by provider As Needed for Rhinitis or Allergies. Use in each nostril as directed     cetirizine (zyrTEC) 10 MG tablet 11/4/2020 Self Yes Yes    Take 10 mg by mouth Daily.    cyclobenzaprine (FLEXERIL) 10 MG tablet Past Month Self Yes Yes    Take 10 mg by mouth Every Night.    estrogens, conjugated, (PREMARIN) 0.625 MG tablet 11/4/2020 Self Yes Yes    Take 1 tablet by mouth daily.    gabapentin (NEURONTIN) 600 MG tablet 11/4/2020 Self Yes Yes    Take 600 mg by mouth 3 (Three) Times a Day As Needed (pain).    HYDROcodone-acetaminophen (NORCO)  MG per tablet 11/4/2020 Self Yes Yes    Take 1 tablet by mouth 2 (Two) Times a Day As Needed for Moderate Pain .    lisinopril-hydrochlorothiazide (PRINZIDE,ZESTORETIC) 20-25 MG per tablet 11/4/2020 Self No Yes    Take 1 tablet by mouth Daily.    pantoprazole (PROTONIX) 40 MG EC tablet 11/4/2020 Self Yes Yes    Take 40 mg by mouth Daily.        ---------------------------------------------------------------------------------------------------------------------    Objective     Hospital Scheduled Meds:  enoxaparin, 40 mg, Subcutaneous, Daily  sodium chloride, 10 mL, Intravenous, Q12H      Pharmacy to Dose enoxaparin (LOVENOX),         Current listed hospital scheduled medications may not yet reflect those currently placed in orders that are signed and held, awaiting patient's arrival to floor/unit.    ---------------------------------------------------------------------------------------------------------------------   Vital Signs:  Temp:  [96.1 °F (35.6 °C)-98.9 °F (37.2 °C)]  96.1 °F (35.6 °C)  Heart Rate:  [59-84] 65  Resp:  [16-18] 18  BP: (112-152)/(62-79) 152/76  Mean Arterial Pressure (Non-Invasive) for the past 24 hrs (Last 3 readings):   Noninvasive MAP (mmHg)   11/04/20 2329 103   11/04/20 2303 94   11/04/20 2248 99     SpO2 Percentage    11/04/20 2248 11/04/20 2303 11/04/20 2329   SpO2: 99% 100% 99%     SpO2:  [98 %-100 %] 99 %  on   ;   Device (Oxygen Therapy): room air    Body mass index is 29.82 kg/m².  Wt Readings from Last 3 Encounters:   11/04/20 89 kg (196 lb 1.6 oz)   10/30/20 88.6 kg (195 lb 6.4 oz)   02/28/20 84.8 kg (187 lb)     ---------------------------------------------------------------------------------------------------------------------   Physical Exam:  Physical Exam  Constitutional:       General: She is awake.      Appearance: Normal appearance. She is well-developed.   HENT:      Head: Normocephalic and atraumatic.      Mouth/Throat:      Lips: Pink.      Mouth: Mucous membranes are moist. No angioedema.   Eyes:      General: Lids are normal.         Right eye: No discharge.         Left eye: No discharge.      Conjunctiva/sclera: Conjunctivae normal.      Right eye: Right conjunctiva is not injected.      Left eye: Left conjunctiva is not injected.   Neck:      Musculoskeletal: Full passive range of motion without pain. No edema or erythema.   Cardiovascular:      Rate and Rhythm: Normal rate and regular rhythm.      Pulses: Normal pulses. No decreased pulses.           Dorsalis pedis pulses are 2+ on the right side and 2+ on the left side.        Posterior tibial pulses are 2+ on the right side and 2+ on the left side.      Heart sounds: Normal heart sounds. No murmur. No friction rub. No gallop.    Pulmonary:      Effort: Pulmonary effort is normal. No tachypnea, bradypnea or respiratory distress.      Breath sounds: Normal breath sounds. No decreased breath sounds, wheezing, rhonchi or rales.   Abdominal:      General: Bowel sounds are normal.       Palpations: Abdomen is soft.      Tenderness: There is no abdominal tenderness.   Musculoskeletal:      Right lower leg: No edema.      Left lower leg: No edema.   Feet:      Right foot:      Skin integrity: Skin integrity normal.      Left foot:      Skin integrity: Skin integrity normal.   Skin:     Findings: No abrasion, ecchymosis or erythema.   Neurological:      General: No focal deficit present.      Mental Status: She is alert and oriented to person, place, and time.      Cranial Nerves: Cranial nerves are intact. No cranial nerve deficit, dysarthria or facial asymmetry.      Sensory: Sensation is intact. No sensory deficit.      Motor: Motor function is intact. No weakness or tremor.      Comments: No focal neurological deficits on exam.  Patient able to move bilateral upper and lower extremities without difficulty.  Speech clear.  No facial droop.   strength equal bilaterally.    Psychiatric:         Mood and Affect: Mood normal.         Behavior: Behavior is cooperative.         Cognition and Memory: Cognition normal.       ---------------------------------------------------------------------------------------------------------------------  EKG:    Pending cardiology read.  Per my evaluation, initial EKG with normal sinus rhythm, poor R wave progression, heart rate 74 bpm, QTc 452 MS.  Repeat EKG with normal sinus rhythm, poor R wave progression, heart rate 62 bpm, QTc 466 MS.    Telemetry:    Normal sinus rhythm, heart rate 79 bpm, SPO2 96% on room air.    I have personally reviewed the EKG/Telemetry strip  ---------------------------------------------------------------------------------------------------------------------   Results from last 7 days   Lab Units 11/04/20  2244 11/04/20 2000   TROPONIN T ng/mL <0.010 <0.010     Results from last 7 days   Lab Units 11/04/20 2000   PROBNP pg/mL 1,297.0*     Results from last 7 days   Lab Units 10/30/20  1551   TRIGLYCERIDES mg/dL 43   HDL CHOL mg/dL  84*   LDL CHOL mg/dL 29       Results from last 7 days   Lab Units 11/04/20 2000 11/04/20  1935 10/30/20  1551   CRP mg/dL 0.11  --   --    LACTATE mmol/L 1.3  --   --    WBC 10*3/mm3  --  4.67 2.89*   HEMOGLOBIN g/dL  --  10.5* 10.0*   HEMATOCRIT %  --  34.5 31.7*   MCV fL  --  86.3 80.7   MCHC g/dL  --  30.4* 31.5   PLATELETS 10*3/mm3  --  163 144   INR   --  0.99  --      Results from last 7 days   Lab Units 11/04/20  2000 10/30/20  1551   SODIUM mmol/L 137 142   POTASSIUM mmol/L 3.6 3.9   MAGNESIUM mg/dL 2.0  --    CHLORIDE mmol/L 103 104   TOTAL CO2 mmol/L  --  29.9*   CO2 mmol/L 27.4  --    BUN mg/dL 14 18   CREATININE mg/dL 0.83 0.87   EGFR IF NONAFRICN AM mL/min/1.73 68 65   EGFR IF AFRICN AM mL/min/1.73  --  78   CALCIUM mg/dL 9.2 8.8   GLUCOSE mg/dL 84  --    ALBUMIN g/dL 3.78 3.80   BILIRUBIN mg/dL 0.4 0.4   ALK PHOS U/L 99 113   AST (SGOT) U/L 12 14   ALT (SGPT) U/L 5 8   Estimated Creatinine Clearance: 75.7 mL/min (by C-G formula based on SCr of 0.83 mg/dL).  No results found for: AMMONIA    Glucose   Date/Time Value Ref Range Status   11/04/2020 1909 86 70 - 130 mg/dL Final     Lab Results   Component Value Date    HGBA1C 5.30 12/16/2018     Lab Results   Component Value Date    TSH 1.770 11/04/2020     Pain Management Panel     Pain Management Panel Latest Ref Rng & Units 11/4/2020    AMPHETAMINES SCREEN, URINE Negative Negative    BARBITURATES SCREEN Negative Negative    BENZODIAZEPINE SCREEN, URINE Negative Negative    BUPRENORPHINEUR Negative Negative    COCAINE SCREEN, URINE Negative Negative    METHADONE SCREEN, URINE Negative Negative        I have personally reviewed the above laboratory results.   ---------------------------------------------------------------------------------------------------------------------  Imaging Results (Last 7 Days)     Procedure Component Value Units Date/Time    XR Chest 1 View [[id number]] Collected: 11/04/20 2044     Updated: 11/04/20 2046    Narrative:       EXAMINATION: XR CHEST 1 VW-      CLINICAL INDICATION:     chest pain     TECHNIQUE:  XR CHEST 1 VW-      COMPARISON: 07/07/2014      FINDINGS:      Lungs are aerated.   Heart size, mediastinum, and pulmonary vascularity are unremarkable.   No pneumothorax.   No effusions.   No acute osseous findings.          Impression:      No radiographic evidence of acute cardiac or pulmonary  disease.     This report was finalized on 11/4/2020 8:44 PM by Dr. Enrike Womack MD.       CT Cerebral Perfusion With & Without Contrast [[id number]] Collected: 11/04/20 1949     Updated: 11/04/20 1954    Narrative:      EXAMINATION: CT CEREBRAL PERFUSION W WO CONTRAST-      CLINICAL INDICATION: Neuro deficit, acute, stroke suspected     COMPARISON: CT same day      DOSE: 164.26     TECHNIQUE: Axial CT images of the brain were obtained after IV  administration of 100 mL Isovue-300 contrast given at a rate of 5 mL/s.  Calculations were performed using [email or web address] perfusion software.     Radiation dose reduction techniques were utilized per ALARA protocol.  Automated exposure control was initiated through either or [employer] or  [email or web address] software packages by  protocol.           FINDINGS:     Ischemic tissue volume (Tmax > 6 seconds, includes core and penumbra): 0  cc  Ischemic core volume (rCBF < 30%): 0 cc  Mismatch (penumbra) volume :0 cc  Mismatch ratio: N/A       Impression:      Normal CT perfusion.     This report was finalized on 11/4/2020 7:52 PM by Dr. Enrike Womack MD.       CT Angiogram Head [[id number]] Collected: 11/04/20 1937     Updated: 11/04/20 1942    Narrative:      CLINICAL INDICATION: Stroke      TECHNIQUE: Multiple axial CT images obtained of the brain following  administration of IV contrast per CTA head protocol. Reformatted CTA  images in the coronal and sagittal planes were generated from the axial  data set to facilitate diagnostic accuracy and/or surgical planning.  Volume Rendered 3D or MIP images  performed.     Radiation dose reduction techniques were utilized per ALARA protocol.  Automated exposure control was initiated through either or [employer] or  [employer] software packages by  protocol.                   COMPARISON: CT same day      FINDINGS:      VASCULAR:     Right ICA:  Moderate calcifications involving the cavernous, ophthalmic,  and clinoid segments. This limits assessment but no high-grade stenosis  or occlusion is identified.  Right MANDI:  Patent. No significant stenosis or occlusion.  Right MCA: Patent. No significant stenosis or occlusion.  Right PCA:  No significant stenosis or occlusion.  Right P-Comm: Patent.  ------  A-comm: Patent.  ------  Left ICA:  Dense calcifications mainly in the left supraclinoid segments  which limits assessment and there is probable moderate stenosis noted.  No occlusion identified.  Left MANDI:  Patent. No significant stenosis or occlusion.  Left MCA:  Patent. No sniffing stenosis or occlusion.  Left PCA:  Patent. No significant stenosis or occlusion.  Left P-Comm: Hypoplastic.     Vertebrobasilar system:  Unremarkable. No segments of significant stenosis or occlusion involving  basilar artery. Basilar apex is within normal limits.     NONVASCULAR:     No enhancing brain lesions. No midline shift or hydrocephalus.       Impression:      1. No large vessel occlusion.  2. Moderate short segment stenosis left supraclinoid ICA with  characterization limited by extensive dense calcified plaque.  3. Otherwise unremarkable exam.           This report was finalized on 11/4/2020 7:40 PM by Dr. Enrike Womack MD.       CT Angiogram Carotids [[id number]] Collected: 11/04/20 1934     Updated: 11/04/20 1940    Narrative:      EXAMINATION: CT ANGIOGRAM CAROTIDS-      CLINICAL INDICATION:  Stroke, follow up     TECHNIQUE: Multiple axial CT images obtained of the brain following  administration of IV contrast per CTA carotid protocol. Reformatted CTA  images in the  coronal and sagittal planes were generated from the axial  data set to facilitate diagnostic accuracy and/or surgical planning.  Volume Rendered 3D or MIP images performed.      Radiation dose reduction techniques were utilized per ALARA protocol.  Automated exposure control was initiated through either or [employer] or  [employer] software packages by  protocol.                Stenosis measurements if obtained, were performed by the NASCET or  similar method.     COMPARISON: None.     FINDINGS:      Aortic arch/arch vessel origins: Three-vessel arch configuration. Mild  plaque origin of left subclavian artery with low-grade stenosis. No  hemodynamically significant stenosis. No occlusion.  ------  Right CCA: Without significant stenosis or evidence of dissection.  Right carotid bulb: No significant stenosis.  Right Proximal Internal Carotid Artery: Without significant stenosis or  evidence of dissection.  ------  Left CCA: Without significant stenosis or evidence of dissection.   Left carotid bulb:  No significant stenosis.  Left Proximal Internal Carotid Artery: Without significant stenosis or  evidence of dissection.  ------  Very mild right vertebral dominance. Vertebral arteries demonstrate no  segments of occlusion or significant stenosis.     NONVASCULAR:     Emphysema of the upper lobes. Degenerative changes cervical spine. No  enhancing masses. No fluid collections.       Impression:      1. No segments of significant stenosis or occlusion.  2. Other findings above.     This report was finalized on 11/4/2020 7:37 PM by Dr. Enrike Womack MD.       CT Head Without Contrast Stroke Protocol [[id number]] Collected: 11/04/20 1917     Updated: 11/04/20 1934    Narrative:      EXAMINATION: CT HEAD WO CONTRAST STROKE PROTOCOL-      CLINICAL INDICATION:     Neuro deficit, acute, stroke suspected     COMPARISON:    09/17/2020     Technique: Multiple CT axial images were obtained through the level of  the  brain without IV contrast administration. Reformatted images in the  coronal and/or sagittal plane(s) were generated from the axial data set  to facilitate diagnostic accuracy and/or surgical planning.     Radiation dose reduction techniques were utilized per ALARA protocol.  Automated exposure control was initiated through either or [employer] or  [employer] software packages by  protocol.       DOSE (DLP mGy-cm):     FINDINGS:       Extra axial spaces: Normal in size and morphology for the patient's age.  Ventricular system: No evidence of hydrocephalus..  Basal cisterns: Unremarkable. No effacement..  Cerebral parenchyma: No focal areas of mass effect. No white matter  abnormalities. No parenchymal hemorrhage..  Midline shift: None.  Cerebellum: Normal.  Visualized brainstem: Normal.  Vascular system: Normal.  Visualized Paranasal sinuses: Clear.  Visualized Orbits: Normal.  Calvarium: Normal.  Sella/skull base/mastoids: Right mastoid effusion is noted..  Visualized soft tissues/scalp: Normal.          Impression:      No CT evidence of acute intracranial abnormality.  Right mastoid effusion.     This report was finalized on 11/4/2020 7:19 PM by Dr. Enrike Womack MD.           I have personally reviewed the above radiology results.     Duplex carotid ultrasound CAR (3/20/2020):        Last Echocardiogram:  Results for orders placed during the hospital encounter of 11/26/18   Adult Transthoracic Echo Complete W/ Cont if Necessary Per Protocol    Narrative · Mild to moderate tricuspid valve regurgitation is present.  · Left ventricular systolic function is normal. Estimated EF = 65%.  · Left atrial cavity size is borderline dilated.  · There is no evidence of pericardial effusion.  · Normal right ventricular cavity size, wall thickness, systolic function   and septal motion noted.  · Left ventricular diastolic function not assessed on this study.  · The aortic valve exhibits sclerosis.  · Mild pulmonary  hypertension is present.  · There is no evidence of pericardial effusion.        ---------------------------------------------------------------------------------------------------------------------    Assessment & Plan      Active Hospital Problems    Diagnosis POA   • **Left arm weakness [R29.898] Yes   • Diastolic CHF, chronic (CMS/[facility]) [I50.32] Yes   • History of TIA (transient ischemic attack) [Z86.73] Not Applicable   • History of gastric ulcer [Z87.19] Not Applicable   • Hyperlipidemia LDL goal <70 [E78.5] Yes     · High intensity statin therapy indicated given the presence of coronary artery disease, albeit mild     • Coronary artery disease involving native coronary artery of native heart with angina pectoris (CMS/[facility]) [I25.119] Yes     · Cardiac catheterization (10/14/13): Nonobstructive CAD. Normal EF.  · Exercise nuclear stress (11/26/18): Moderate exercise intolerance with development of chest pain and nonsustained VT. Normal perfusion images with no ischemia. LVEF 50%  · Echo (11/26/18):  Normal LVEF.  Mild pulmonary hypertension  · Cardiac catheterization (12/17/18): Mild nonobstructive CAD. Normal LVEF.     • Essential hypertension [I10] Yes     • Target blood pressure <130/80 mmHg       #Left-sided weakness  #Headaches  #Patient reported history of right sided CVA in the past (2005)  #Hyperlipidemia  #Essential hypertension  #Nonobstructive coronary artery disease  -Telemetry neurology was consulted in the emergency department (Dr. Rodrigues) who evaluated the patient  -Noncontrast CT of the head, CT angiogram of the carotids, CT angiogram of the head, and CT cerebral perfusion showed no acute findings  -MRI requested per neurology to evaluate for stroke and other structural pathology; MRI with and without contrast ordered  -Lipid panel from 10/30/20 reviewed, unremarkable   -Received loading dose aspirin in ED; continue with aspirin and statin; Plavix added per neurology  -In regards to the  patient's headaches, neurology felt they were atypical for migraines; treat with migraine cocktail for now  -Obtain neuro checks every 4 hours  -Fall precautions in place; patient is to be up with assistance  -BP is currently controlled with last /57  -Currently awaiting home medication list; review once available    #Chest pain with mixes features present upon admission   #Non-obstructive CAD  #Hyperlipdiemia   -Currently chest pain-free  -Troponin T negative x2; EKG without acute ischemic changes  -Continue to trend troponin and obtain serial EKGs  -Continue aspirin, Plavix, statin as stated above  -Left heart cath from December 2018 reviewed, shows non-obstructive CAD  -Monitor closely    #Chronic diastolic CHF  -No evidence of acute exacerbation at this time  -Echo from November 2018 reviewed, EF 65%  -Monitor for signs of volume overload Daily weights, strict I's and O's    #Suspect CKD stage II (baseline Cr 0.8-0.9)  -Renal function currently at baseline  -Repeat AM CMP     #Prolonged QTC  -QTc 466 MS  -Obtain magnesium and phosphorus level, replace as necessary  -Avoid QTC prolonging agents as much as possible    #Asymptomatic bacteriuria  -UA with 4+ bacteria  -Patient denies any urinary symptoms  -Urine culture ordered     #GERD  #History of gastric ulcer  -Protonix    F/E/N: No IV fluids.  Replace electrolytes as necessary.  NPO.  ---------------------------------------------------  DVT Prophylaxis: Subcutaneous Lovenox  GI Prophylaxis: Protonix  Activity: Up with assistance, fall precautions    The patient is considered to be a high risk patient due to: Left-sided weakness, headache,     INPATIENT status due to the need for care which can only be reasonably provided in an hospital setting such as aggressive/expedited ancillary services and/or consultation services, the necessity for IV medications, close physician monitoring and/or the possible need for procedures.  In such, I feel patient’s risk  for adverse outcomes and need for care warrant INPATIENT evaluation and predict the patient’s care encounter to likely last beyond 2 midnights.    Code Status: FULL CODE     I have discussed the patient's assessment and plan with the patient, nursing staff, and attending physician      Raegan Ayala PA-C  Hospitalist Service -- [facility]       11/05/20  02:19 EST    Attending Physician: Deidra Gore MD

## 2020-11-05 NOTE — CONSULTS
"Stroke Consult Note    Patient Name: Lyla Vazquez   MRN: [id number]  Age: 70 y.o.  Sex: female  : 1950    Primary Care Physician: Joyce Braden MD  Referring Physician:  No ref. provider found    TIME STROKE TEAM CALLED:  EST     TIME PATIENT SEEN:  EST    Handedness: R  Race: W    Chief Complaint/Reason for Consultation: left sided weakness     Subjective .  HPI:     This is 70-year-old with history of stroke 14 years ago, with residual left-sided weakness, coronary artery disease, hypertension, takes aspirin 81 daily presents today with worsening left-sided weakness.  She claims that yesterday her chest was hurting, followed by swelling of the lips, drooling of the left side of the lip.  She subjectively feels that her left arm is tired and weak than her baseline.    Denies atrial fibrillation.    Headaches-has been having for the past 4 months.  Throbbing, continuous, left-sided, also from occiput, radiates to the left frontal region.  She cannot think anything which makes it better.  She also complains of some left eye blurry vision.  Currently she has an intense headache which she claims as \"fist  behind her left ear\".       Last Known Normal Date/Time:  EST     Review of Systems   Constitutional: No fatigue  HENT: Negative for nosebleeds and rhinorrhea.    Eyes: Negative for redness.   Respiratory: Negative for cough.    Gastrointestinal: Negative for anal bleeding.   Endocrine: Negative for polydipsia.   Genitourinary: Negative for enuresis and urgency.   Musculoskeletal: Negative for joint swelling.   Neurological: Negative for tremors.   Psychiatric/Behavioral: Negative for hallucinations.     Temp:  [98.8 °F (37.1 °C)-98.9 °F (37.2 °C)] 98.8 °F (37.1 °C)  Heart Rate:  [80-84] 84  Resp:  [16] 16  BP: (139-142)/(70-74) 142/74      Acute Stroke Data    Alteplase (tPA) Inclusion / Exclusion Criteria    Time: 20:00 EST  Person Administering Scale: Arthur Rodrigues MD    Inclusion " Criteria  [x]   18 years of age or greater   []   Onset of symptoms < 4.5 hours before beginning treatment (stroke onset = time patient was last seen well or without symptoms).   []   Diagnosis of acute ischemic stroke causing measurable disabling deficit (Complete Hemianopia, Any Aphasia, Visual or Sensory Extinction, Any weakness limiting sustained effort against gravity)   []   Any remaining deficit considered potentially disabling in view of patient and practitioner   Exclusion criteria (Do not proceed with Alteplase if any are checked under exclusion criteria)  [x]   Onset unknown or GREATER than 4.5 hours   []   ICH on CT/MRI   []   CT demonstrates hypodensity representing acute or subacute infarct   []   Significant head trauma or prior stroke in the previous 3 months   []   Symptoms suggestive of subarachnoid hemorrhage   []   History of un-ruptured intracranial aneurysm GREATER than 10 mm   []   Recent intracranial or intraspinal surgery within the last 3 months   []   Arterial puncture at a non-compressible site in the previous 7 days   []   Active internal bleeding   []   Acute bleeding tendency   []   Platelet count LESS than 100,000 for known hematological diseases such as leukemia, thrombocytopenia or chronic cirrhosis   []   Current use of anticoagulant with INR GREATER than 1.7 or PT GREATER than 15 seconds, aPTT GREATER than 40 seconds   []   Heparin received within 48 hours, resulting in abnormally elevated aPTT GREATER than upper limit of normal   []   Current use of direct thrombin inhibitors or direct factor Xa inhibitors in the past 48 hours   []   Elevated blood pressure refractory to treatment (systolic GREATER than 185 mm/Hg or diastolic  GREATER than 110 mm/Hg   []   Suspected infective endocarditis and aortic arch dissection   []   Current use of therapeutic treatment dose of low-molecular-weight heparin (LMWH) within the previous 24 hours   []   Structural GI malignancy or bleed    Relative exclusion for all patients  []   Only minor non-disabling symptoms   []   Pregnancy   []   Seizure at onset with postictal residual neurological impairments   []   Major surgery or previous trauma within past 14 days   []   History of previous spontaneous ICH, intracranial neoplasm, or AV malformation   []   Postpartum (within previous 14 days)   []   Recent GI or urinary tract hemorrhage (within previous 21 days)   []   Recent acute MI (within previous 3 months)   []   History of un-ruptured intracranial aneurysm LESS than 10 mm   []   History of ruptured intracranial aneurysm   []   Blood glucose LESS than 50 mg/dL (2.7 mmol/L)   []   Dural puncture within the last 7 days   []   Known GREATER than 10 cerebral microbleeds   Additional exclusions for patients with symptoms onset between 3 and 4.5 hours.  []   Age > 80.   []   On any anticoagulants regardless of INR  >>> Warfarin (Coumadin), Heparin, Enoxaparin (Lovenox), fondaparinux (Arixtra), bivalirudin (Angiomax), Argatroban, dabigatran (Pradaxa), rivaroxaban (Xarelto), or apixaban (Eliquis)   []   Severe stroke (NIHSS > 25).   []   History of BOTH diabetes and previous ischemic stroke.   []   The risks and benefits have been discussed with the patient or family related to the administration of IV Alteplase for stroke symptoms.   []   I have discussed and reviewed the patient's case and imaging with the attending prior to IV Alteplase.    Time Alteplase administered       Past Medical History:   Diagnosis Date   • Anemia    • Anesthesia complication     HARD TO WAKE   • Arthritis    • Arthritis    • Chronic bronchitis (CMS/HCC)    • Coronary artery disease    • Elevated cholesterol    • Epigastric pain 8/30/2019    Added automatically from request for surgery [id number]   • Gastric ulcer    • GERD (gastroesophageal reflux disease)    • H/O CT scan of brain    • Hematoma of left lower extremity 10/13/2016   • History of transfusion    • Hypertension    •  Myocardial infarction (CMS/HCC)    • Numbness    • Stroke (CMS/HCC)     TIA   • Varicose veins    • Wears dentures    • Wears glasses      Past Surgical History:   Procedure Laterality Date   • ABDOMINAL SURGERY     • BRAVO PROCEDURE N/A 9/16/2019    Procedure: ESOPHAGOGASTRODUODENOSCOPY AND FRIEDMAN;  Surgeon: Neil Balderrama MD;  Location: [facility] OR;  Service: Gastroenterology   • CARDIAC CATHETERIZATION     • CARDIAC CATHETERIZATION N/A 12/17/2018    Procedure: Left Heart Cath;  Surgeon: Sandip Zamora IV, MD;  Location: [facility] CATH INVASIVE LOCATION;  Service: Cardiovascular   • CHOLECYSTECTOMY      laparoscopic   • COLONOSCOPY  2014   • ENDOSCOPY     • ENDOSCOPY N/A 11/11/2019    Procedure: ESOPHAGOGASTRODUODENOSCOPY WITH  DILATATION WITH FLUOROSCOPY;  Surgeon: Neil Balderrama MD;  Location: [facility] OR;  Service: Gastroenterology   • FRACTURE SURGERY     • HYSTERECTOMY      benign   • OTHER SURGICAL HISTORY      leg repair   • STOMACH SURGERY       Family History   Problem Relation Age of Onset   • Diabetes Other    • Hypertension Other    • Ovarian cancer Other    • Cancer Mother         bladder   • Cancer Sister         liver   • Diabetes Sister    • Diabetes Brother    • No Known Problems Father    • Breast cancer Neg Hx      Social History     Socioeconomic History   • Marital status:      Spouse name: Not on file   • Number of children: Not on file   • Years of education: Not on file   • Highest education level: Not on file   Tobacco Use   • Smoking status: Never Smoker   • Smokeless tobacco: Never Used   Substance and Sexual Activity   • Alcohol use: No   • Drug use: No   • Sexual activity: Defer     Allergies   Allergen Reactions   • Sulfa Antibiotics Anaphylaxis     Prior to Admission medications    Medication Sig Start Date End Date Taking? Authorizing Provider   aspirin (aspirin) 81 MG EC tablet Take 1 tablet by mouth Daily. 10/30/20   Sandip Zamora IV, MD   atorvastatin  (LIPITOR) 20 MG tablet Take 1 tablet by mouth Every Night. 10/30/20   Sandip Zamora IV, MD   azelastine (ASTELIN) 0.1 % nasal spray 2 sprays into the nostril(s) as directed by provider As Needed for Rhinitis. Use in each nostril as directed    Compa Lerma MD   cetirizine (zyrTEC) 10 MG tablet Take 10 mg by mouth Daily.    Compa Lerma MD   cyclobenzaprine (FLEXERIL) 10 MG tablet Take 10 mg by mouth Every Night.    Compa eLrma MD   estrogens, conjugated, (PREMARIN) 0.625 MG tablet Take 1 tablet by mouth daily. 14   Compa Lerma MD   gabapentin (NEURONTIN) 300 MG capsule Take 600 mg by mouth 3 (Three) Times a Day. 14   Compa Lerma MD   HYDROcodone-acetaminophen (NORCO)  MG per tablet Take 1 tablet by mouth 2 (Two) Times a Day. Take 1 tablet every 4 to 6 hours as needed for pain 14   Compa Lerma MD   lisinopril-hydrochlorothiazide (PRINZIDE,ZESTORETIC) 20-25 MG per tablet Take 1 tablet by mouth Daily. 10/30/20   Sandip Zamora IV, MD   pantoprazole (PROTONIX) 40 MG EC tablet Take 40 mg by mouth Daily.    Compa Lerma MD       [facility] Meds:  Scheduled-    Infusions-     PRNs- •  Insert peripheral IV **AND** sodium chloride    Functional Status Prior to Current Stroke/[place] Score: 0    .  1a  Level of consciousness: 0=alert; keenly responsive   1b. LOC questions:  0=Performs both tasks correctly   1c. LOC commands: 0=Performs both tasks correctly   2.  Best Gaze: 0=normal   3.  Visual: 0=No visual loss   4. Facial Palsy: 0=Normal symmetric movement   5a.  Motor left arm: 0=No drift, limb holds 90 (or 45) degrees for full 10 seconds   5b.  Motor right arm: 0=No drift, limb holds 90 (or 45) degrees for full 10 seconds   6a. motor left le=No drift, limb holds 90 (or 45) degrees for full 10 seconds   6b  Motor right le=No drift, limb holds 90 (or 45) degrees for full 10 seconds   7. Limb Ataxia: 0=Absent    8.  Sensory: 0=Normal; no sensory loss   9. Best Language:  0=No aphasia, normal   10. Dysarthria: 0=Normal   11. Extinction and Inattention: 0=No abnormality    Total:   0     NIH Stroke Scale  Time: 20:00 EST  Person Administering Scale: Arthur Rodrigues MD    Results Reviewed:  I have personally reviewed current lab, radiology, and data and agree with results.    Results for orders placed during the hospital encounter of 11/26/18   Adult Transthoracic Echo Complete W/ Cont if Necessary Per Protocol    Narrative · Mild to moderate tricuspid valve regurgitation is present.  · Left ventricular systolic function is normal. Estimated EF = 65%.  · Left atrial cavity size is borderline dilated.  · There is no evidence of pericardial effusion.  · Normal right ventricular cavity size, wall thickness, systolic function   and septal motion noted.  · Left ventricular diastolic function not assessed on this study.  · The aortic valve exhibits sclerosis.  · Mild pulmonary hypertension is present.  · There is no evidence of pericardial effusion.                Assessment/Plan:  This is 70-year-old right-handed white female with history of hyperlipidemia, GERD, hypertension, coronary artery disease presents with left-sided symptoms.  Patient not a candidate for any acute stroke intervention therapies [out of the window for TPA,  No LVO]          Diagnostics- I personally reviewed all the imaging and labs  -CT head-no acute abnormality  -CT head and neck.-Significant intracranial respiratory disease, left supraclinoid calcified atherosclerosis  -MRI brain-  pending  -Transthoracic echo-   -LDL , A1c        Left-sided weakness- CTA head showed diffuse intracranial atherosclerosis, will obtain MRI brain with and without to evaluate for stroke and other structural pathology.  Please initiate TIA/stroke order set. add Plavix to her current regimen.  We will continue to follow her tomorrow.    Headache disorder, unspecified-patient  has been having headache for the past 4 months.  atypical for migrainous.  Not on any prophylaxis or abortive therapy.  Treat her headache with migraine cocktail during hospital stay.  We will further evaluate tomorrow to determine if she needs to be on prophylaxis.    1b. Stroke secondary prevention-continue aspirin Plavix  1c. Stroke recovery- Activity, PT/OT/Speech-we will be transferred today  1d. Stroke education- Educated on modifiable stroke risk factors, adherence to medications                       #2 Hypertension-allow autoregulation, treat only if systolic blood pressure is greater than 200.  #3 Type 2 diabetes-strict glycemic control, goal less than 7  #4 Hyperlipidemia- LDL goal less than < 70. Start high intensity statin        This was a high complex patient and nature of presentation was acute necessitating evaluation for life/limb saving condition. The test results were discussed with the patient/ family and with admitting/primary physician taking care in the hospital and recommended further diagnostics and management plans in a collaborative fashion.           Arthur Rodrigues MD  November 4, 2020  20:00 EST    Verbal consent taken.  Patient agreeable to be seen via telemedicine.    This was an audio and video enabled telemedicine encounter.

## 2020-11-05 NOTE — PLAN OF CARE
Goal Outcome Evaluation:           Pt resting with no complaints. Denies dizziness, blurred vision, weakness, and chest pain. Will continue to monitor and follow plan of care.

## 2020-11-05 NOTE — PAYOR COMM NOTE
"[facility]  NPI:[id number]    Utilization Review  Contact: Destinee Caballero RN  Phone: [phone]  Fax:[phone]    INITIATE INPATIENT AUTHORIZATION    Lyla Vazquez (70 y.o. Female)     Date of Birth Social Security Number Address Home Phone MRN    1950  [address] [address] [phone] [id number]    Jew Marital Status          Sikhism        Admission Date Admission Type Admitting Provider Attending Provider Department, Room/Bed    11/4/20 Emergency Deidra Gore MD Cagle, William, DO [address], 3316/1S    Discharge Date Discharge Disposition Discharge Destination                       Attending Provider: Garry Brennan DO    Allergies: Sulfa Antibiotics    Isolation: None   Infection: COVID (rule out) (11/04/20)   Code Status: CPR    Ht: 172.7 cm (68\")   Wt: 89 kg (196 lb 1.6 oz)    Admission Cmt: None   Principal Problem: Left arm weakness [R29.898]                 Active Insurance as of 11/4/2020     Primary Coverage     Payor Plan Insurance Group Employer/Plan Group    ANTHEM BLUE CROSS ANTHEM BLUE CROSS BLUE SHIELD PPO [id number]     Payor Plan Address Payor Plan Phone Number Payor Plan Fax Number Effective Dates    [address] [phone]  8/1/2018 - None Entered    [facility] [id number]       Subscriber Name Subscriber Birth Date Member ID       LYLA VAZQUEZ 1950 [id number]           Secondary Coverage     Payor Plan Insurance Group Employer/Plan Group    MEDICARE MEDICARE A & B      Payor Plan Address Payor Plan Phone Number Payor Plan Fax Number Effective Dates    [address] [phone]  8/1/2017 - None Entered    [facility] [id number]       Subscriber Name Subscriber Birth Date Member ID       LYLA VAZQUEZ 1950 [id number]                 Emergency Contacts      (Rel.) Home Phone Work Phone Mobile Phone    Alex Vazquez (Son) [phone] -- [phone]    Juan Vazquez (Son) [phone] -- " [phone]               History & Physical      Raeagn Ayala PA-C at 20 0219               [facility]  HISTORY & PHYSICAL    Patient Identification:  Name:  Lyla Vazquez  Age:  70 y.o.  Sex:  female  :  1950  MRN:  [id number]   Visit Number:  [id number]  Admit Date: 2020   Primary Care Physician:  Joyce Braden MD     Subjective     Chief complaint:   Chief Complaint   Patient presents with   • Chest Pain   • Facial Swelling     History of presenting illness:   Patient is a 70 y.o. female with past medical history significant for history of TIA, chronic normocytic anemia, chronic diastolic CHF, essential hypertension, GERD, history of gastric ulcer, hyperlipidemia, nonobstructive coronary artery disease, that presented to the [facility] emergency department for evaluation of chest pain and angioedema.     The patient states that she initially began experiencing a substernal chest tightness/heaviness around 2 PM yesterday evening while she was driving to work.  She reports that the pain originated in her left armpit and then radiated to her chest substernally.  She states that she also began noticing some angioedema of her lips and left eye.  As result, she decided to go to her primary care provider, Dr. Braden, to be evaluated.  She states that her chest pain was persistent and nothing seemed to make it better or worse.  She denies noticing any diaphoresis, palpitations, shortness of breath, or nausea associated with the chest pain.  She reports that when she arrived to her PCPs office he noticed some mild left-sided facial drooping and weakness in her left eye so he recommended she come to the emergency department to be further evaluated.  She denies any possibility of an allergic reaction and denies any new medications, different foods, or new cosmetics.  She denies noticing any slurring of speech, lightheadedness, numbness, or  "tremors.  She states that her left arm did feel slightly more weak but states that simply felt \"tired.\"  She further denies any recent illness, fever, chills, congestion, sore throat, coughing, wheezing, leg edema, abdominal pain, nausea, vomiting, diarrhea, dysuria, malodorous urine, difficulty with ambulation, confusion.  She states that she has been experiencing severe headaches for around 3-1/2 to 4 months.  She states that these headaches typically occur on the left side of her head and feel like a heavy pressure.  She states that when she is experiencing these headaches she does become dizzy.  She states that lights and/or sound do not seem to make the headache any worse.  She denies taking any Tylenol or ibuprofen at home for the pain.  She further denies any neck stiffness, erythema, or edema.  The patient does report a right-sided CVA around 15 years ago and states that she does have some chronic left-sided residual weakness.    Upon arrival to the ED, vitals were temperature 98.9 °F, pulse 80, respirations 16, /70, SPO2 98% on room air.  Troponin T negative x2.  proBNP 1297.0.  CMP with total protein 5.9, otherwise unremarkable.  CBC with hemoglobin 10.5, MCH 26.3, MCHC 30.4, MPV 12.9.  UA with specific gravity greater than 1.030, positive nitrites, 4+ bacteria.  Blood cultures pending x2.  UDS positive for opiates.  Chest x-ray shows no acute cardiopulmonary findings.  CT angiogram of the carotid shows no significant stenosis or occlusion.  CT angiogram of the head shows no large vessel occlusion, moderate short segment stenosis left supraclinoid ICA with characterization limited by extensive dense calcified plaque.  CT of the head without contrast shows no acute intracranial abnormality.  CT cerebral perfusion with and without contrast shows a normal CT perfusion.  Initial EKG with normal sinus rhythm, poor R wave progression, heart rate 74 bpm, QTc 462 MS.  COVID-19 negative.    In the emergency " "department the patient received p.o. aspirin 324 mg, p.o. Plavix 75 mg, IV Benadryl 25 mg, IV magnesium sulfate 1 g.    Patient has been admitted to the telemetry floor for further evaluation and treatment  ---------------------------------------------------------------------------------------------------------------------   Review of Systems   Constitutional: Negative for chills, diaphoresis and fever.   HENT: Positive for facial swelling (mouth and left eye). Negative for congestion and sore throat.    Eyes: Negative for discharge and visual disturbance.        Left eye edema   Respiratory: Negative for cough, shortness of breath and wheezing.    Cardiovascular: Positive for chest pain (substernal pressure). Negative for palpitations and leg swelling.   Gastrointestinal: Negative for abdominal pain, constipation, diarrhea, nausea and vomiting.   Endocrine: Negative for polydipsia and polyuria.   Genitourinary: Negative for dysuria and frequency.   Musculoskeletal: Negative for back pain and gait problem.   Skin: Negative for rash and wound.   Neurological: Positive for dizziness, facial asymmetry (left mouth ), weakness (left arm) and headaches (3.5-4 months; left sided \"pressure\"). Negative for tremors, syncope, speech difficulty, light-headedness and numbness.   Hematological: Does not bruise/bleed easily.   Psychiatric/Behavioral: Negative for confusion. The patient is not nervous/anxious.       ---------------------------------------------------------------------------------------------------------------------   Past Medical History:   Diagnosis Date   • Arthritis    • Chronic bronchitis (CMS/HCC)    • Coronary artery disease    • Diastolic CHF, chronic (CMS/HCC) 11/5/2020   • GERD (gastroesophageal reflux disease)    • History of gastric ulcer    • History of TIA (transient ischemic attack)    • Hyperlipidemia    • Hypertension    • Myocardial infarction (CMS/HCC)    • Normocytic anemia    • Numbness  "     Past Surgical History:   Procedure Laterality Date   • ABDOMINAL SURGERY     • BRAVO PROCEDURE N/A 9/16/2019    Procedure: ESOPHAGOGASTRODUODENOSCOPY AND FRIEDMAN;  Surgeon: Neil Baledrrama MD;  Location:  [facility] OR;  Service: Gastroenterology   • CARDIAC CATHETERIZATION     • CARDIAC CATHETERIZATION N/A 12/17/2018    Procedure: Left Heart Cath;  Surgeon: Sandip Zamora IV, MD;  Location:  [facility];  Service: Cardiovascular   • CHOLECYSTECTOMY      laparoscopic   • COLONOSCOPY  2014   • ENDOSCOPY     • ENDOSCOPY N/A 11/11/2019    Procedure: ESOPHAGOGASTRODUODENOSCOPY WITH  DILATATION WITH FLUOROSCOPY;  Surgeon: Neil Balderrama MD;  Location:  [facility];  Service: Gastroenterology   • FRACTURE SURGERY     • HYSTERECTOMY      benign   • OTHER SURGICAL HISTORY      leg repair   • STOMACH SURGERY       Family History   Problem Relation Age of Onset   • Diabetes Other    • Hypertension Other    • Ovarian cancer Other    • Cancer Mother         bladder   • Cancer Sister         liver   • Diabetes Sister    • Diabetes Brother    • No Known Problems Father    • Breast cancer Neg Hx      Social History     Socioeconomic History   • Marital status:      Spouse name: Not on file   • Number of children: Not on file   • Years of education: Not on file   • Highest education level: Not on file   Tobacco Use   • Smoking status: Never Smoker   • Smokeless tobacco: Never Used   Substance and Sexual Activity   • Alcohol use: No   • Drug use: No   • Sexual activity: Defer     ---------------------------------------------------------------------------------------------------------------------   Allergies:  Sulfa antibiotics  ---------------------------------------------------------------------------------------------------------------------   Medications below are reported home medications pulling from within the system; at this time, these medications have not been reconciled unless otherwise  specified and are in the verification process for further verifcation as current home medications.    Prior to Admission Medications     Prescriptions Last Dose Informant Patient Reported? Taking?    aspirin (aspirin) 81 MG EC tablet 11/4/2020 Self No Yes    Take 1 tablet by mouth Daily.    atorvastatin (LIPITOR) 20 MG tablet 11/4/2020 Self Yes Yes    Take 20 mg by mouth Daily.    azelastine (ASTELIN) 0.1 % nasal spray 11/4/2020 Self Yes Yes    2 sprays into the nostril(s) as directed by provider As Needed for Rhinitis or Allergies. Use in each nostril as directed     cetirizine (zyrTEC) 10 MG tablet 11/4/2020 Self Yes Yes    Take 10 mg by mouth Daily.    cyclobenzaprine (FLEXERIL) 10 MG tablet Past Month Self Yes Yes    Take 10 mg by mouth Every Night.    estrogens, conjugated, (PREMARIN) 0.625 MG tablet 11/4/2020 Self Yes Yes    Take 1 tablet by mouth daily.    gabapentin (NEURONTIN) 600 MG tablet 11/4/2020 Self Yes Yes    Take 600 mg by mouth 3 (Three) Times a Day As Needed (pain).    HYDROcodone-acetaminophen (NORCO)  MG per tablet 11/4/2020 Self Yes Yes    Take 1 tablet by mouth 2 (Two) Times a Day As Needed for Moderate Pain .    lisinopril-hydrochlorothiazide (PRINZIDE,ZESTORETIC) 20-25 MG per tablet 11/4/2020 Self No Yes    Take 1 tablet by mouth Daily.    pantoprazole (PROTONIX) 40 MG EC tablet 11/4/2020 Self Yes Yes    Take 40 mg by mouth Daily.        ---------------------------------------------------------------------------------------------------------------------    Objective     Hospital Scheduled Meds:  enoxaparin, 40 mg, Subcutaneous, Daily  sodium chloride, 10 mL, Intravenous, Q12H      Pharmacy to Dose enoxaparin (LOVENOX),         Current listed hospital scheduled medications may not yet reflect those currently placed in orders that are signed and held, awaiting patient's arrival to floor/unit.     ---------------------------------------------------------------------------------------------------------------------   Vital Signs:  Temp:  [96.1 °F (35.6 °C)-98.9 °F (37.2 °C)] 96.1 °F (35.6 °C)  Heart Rate:  [59-84] 65  Resp:  [16-18] 18  BP: (112-152)/(62-79) 152/76  Mean Arterial Pressure (Non-Invasive) for the past 24 hrs (Last 3 readings):   Noninvasive MAP (mmHg)   11/04/20 2329 103   11/04/20 2303 94   11/04/20 2248 99     SpO2 Percentage    11/04/20 2248 11/04/20 2303 11/04/20 2329   SpO2: 99% 100% 99%     SpO2:  [98 %-100 %] 99 %  on   ;   Device (Oxygen Therapy): room air    Body mass index is 29.82 kg/m².  Wt Readings from Last 3 Encounters:   11/04/20 89 kg (196 lb 1.6 oz)   10/30/20 88.6 kg (195 lb 6.4 oz)   02/28/20 84.8 kg (187 lb)     ---------------------------------------------------------------------------------------------------------------------   Physical Exam:  Physical Exam  Constitutional:       General: She is awake.      Appearance: Normal appearance. She is well-developed.   HENT:      Head: Normocephalic and atraumatic.      Mouth/Throat:      Lips: Pink.      Mouth: Mucous membranes are moist. No angioedema.   Eyes:      General: Lids are normal.         Right eye: No discharge.         Left eye: No discharge.      Conjunctiva/sclera: Conjunctivae normal.      Right eye: Right conjunctiva is not injected.      Left eye: Left conjunctiva is not injected.   Neck:      Musculoskeletal: Full passive range of motion without pain. No edema or erythema.   Cardiovascular:      Rate and Rhythm: Normal rate and regular rhythm.      Pulses: Normal pulses. No decreased pulses.           Dorsalis pedis pulses are 2+ on the right side and 2+ on the left side.        Posterior tibial pulses are 2+ on the right side and 2+ on the left side.      Heart sounds: Normal heart sounds. No murmur. No friction rub. No gallop.    Pulmonary:      Effort: Pulmonary effort is normal. No tachypnea, bradypnea or  respiratory distress.      Breath sounds: Normal breath sounds. No decreased breath sounds, wheezing, rhonchi or rales.   Abdominal:      General: Bowel sounds are normal.      Palpations: Abdomen is soft.      Tenderness: There is no abdominal tenderness.   Musculoskeletal:      Right lower leg: No edema.      Left lower leg: No edema.   Feet:      Right foot:      Skin integrity: Skin integrity normal.      Left foot:      Skin integrity: Skin integrity normal.   Skin:     Findings: No abrasion, ecchymosis or erythema.   Neurological:      General: No focal deficit present.      Mental Status: She is alert and oriented to person, place, and time.      Cranial Nerves: Cranial nerves are intact. No cranial nerve deficit, dysarthria or facial asymmetry.      Sensory: Sensation is intact. No sensory deficit.      Motor: Motor function is intact. No weakness or tremor.      Comments: No focal neurological deficits on exam.  Patient able to move bilateral upper and lower extremities without difficulty.  Speech clear.  No facial droop.   strength equal bilaterally.    Psychiatric:         Mood and Affect: Mood normal.         Behavior: Behavior is cooperative.         Cognition and Memory: Cognition normal.       ---------------------------------------------------------------------------------------------------------------------  EKG:    Pending cardiology read.  Per my evaluation, initial EKG with normal sinus rhythm, poor R wave progression, heart rate 74 bpm, QTc 452 MS.  Repeat EKG with normal sinus rhythm, poor R wave progression, heart rate 62 bpm, QTc 466 MS.    Telemetry:    Normal sinus rhythm, heart rate 79 bpm, SPO2 96% on room air.    I have personally reviewed the EKG/Telemetry strip  ---------------------------------------------------------------------------------------------------------------------   Results from last 7 days   Lab Units 11/04/20  2244 11/04/20 2000   TROPONIN T ng/mL <0.010 <0.010      Results from last 7 days   Lab Units 11/04/20 2000   PROBNP pg/mL 1,297.0*     Results from last 7 days   Lab Units 10/30/20  1551   TRIGLYCERIDES mg/dL 43   HDL CHOL mg/dL 84*   LDL CHOL mg/dL 29       Results from last 7 days   Lab Units 11/04/20 2000 11/04/20  1935 10/30/20  1551   CRP mg/dL 0.11  --   --    LACTATE mmol/L 1.3  --   --    WBC 10*3/mm3  --  4.67 2.89*   HEMOGLOBIN g/dL  --  10.5* 10.0*   HEMATOCRIT %  --  34.5 31.7*   MCV fL  --  86.3 80.7   MCHC g/dL  --  30.4* 31.5   PLATELETS 10*3/mm3  --  163 144   INR   --  0.99  --      Results from last 7 days   Lab Units 11/04/20  2000 10/30/20  1551   SODIUM mmol/L 137 142   POTASSIUM mmol/L 3.6 3.9   MAGNESIUM mg/dL 2.0  --    CHLORIDE mmol/L 103 104   TOTAL CO2 mmol/L  --  29.9*   CO2 mmol/L 27.4  --    BUN mg/dL 14 18   CREATININE mg/dL 0.83 0.87   EGFR IF NONAFRICN AM mL/min/1.73 68 65   EGFR IF AFRICN AM mL/min/1.73  --  78   CALCIUM mg/dL 9.2 8.8   GLUCOSE mg/dL 84  --    ALBUMIN g/dL 3.78 3.80   BILIRUBIN mg/dL 0.4 0.4   ALK PHOS U/L 99 113   AST (SGOT) U/L 12 14   ALT (SGPT) U/L 5 8   Estimated Creatinine Clearance: 75.7 mL/min (by C-G formula based on SCr of 0.83 mg/dL).  No results found for: AMMONIA    Glucose   Date/Time Value Ref Range Status   11/04/2020 1909 86 70 - 130 mg/dL Final     Lab Results   Component Value Date    HGBA1C 5.30 12/16/2018     Lab Results   Component Value Date    TSH 1.770 11/04/2020     Pain Management Panel     Pain Management Panel Latest Ref Rng & Units 11/4/2020    AMPHETAMINES SCREEN, URINE Negative Negative    BARBITURATES SCREEN Negative Negative    BENZODIAZEPINE SCREEN, URINE Negative Negative    BUPRENORPHINEUR Negative Negative    COCAINE SCREEN, URINE Negative Negative    METHADONE SCREEN, URINE Negative Negative        I have personally reviewed the above laboratory results.    ---------------------------------------------------------------------------------------------------------------------  Imaging Results (Last 7 Days)     Procedure Component Value Units Date/Time    XR Chest 1 View [[id number]] Collected: 11/04/20 2044     Updated: 11/04/20 2046    Narrative:      EXAMINATION: XR CHEST 1 VW-      CLINICAL INDICATION:     chest pain     TECHNIQUE:  XR CHEST 1 VW-      COMPARISON: 07/07/2014      FINDINGS:      Lungs are aerated.   Heart size, mediastinum, and pulmonary vascularity are unremarkable.   No pneumothorax.   No effusions.   No acute osseous findings.          Impression:      No radiographic evidence of acute cardiac or pulmonary  disease.     This report was finalized on 11/4/2020 8:44 PM by Dr. Enrike Womack MD.       CT Cerebral Perfusion With & Without Contrast [[id number]] Collected: 11/04/20 1949     Updated: 11/04/20 1954    Narrative:      EXAMINATION: CT CEREBRAL PERFUSION W WO CONTRAST-      CLINICAL INDICATION: Neuro deficit, acute, stroke suspected     COMPARISON: CT same day      DOSE: 164.26     TECHNIQUE: Axial CT images of the brain were obtained after IV  administration of 100 mL Isovue-300 contrast given at a rate of 5 mL/s.  Calculations were performed using Vyu.Indexing perfusion software.     Radiation dose reduction techniques were utilized per ALARA protocol.  Automated exposure control was initiated through either or Yebhi or  DoseRight software packages by  protocol.           FINDINGS:     Ischemic tissue volume (Tmax > 6 seconds, includes core and penumbra): 0  cc  Ischemic core volume (rCBF < 30%): 0 cc  Mismatch (penumbra) volume :0 cc  Mismatch ratio: N/A       Impression:      Normal CT perfusion.     This report was finalized on 11/4/2020 7:52 PM by Dr. Enrike Womack MD.       CT Angiogram Head [[id number]] Collected: 11/04/20 1937     Updated: 11/04/20 1942    Narrative:      CLINICAL INDICATION: Stroke      TECHNIQUE: Multiple  axial CT images obtained of the brain following  administration of IV contrast per CTA head protocol. Reformatted CTA  images in the coronal and sagittal planes were generated from the axial  data set to facilitate diagnostic accuracy and/or surgical planning.  Volume Rendered 3D or MIP images performed.     Radiation dose reduction techniques were utilized per ALARA protocol.  Automated exposure control was initiated through either or Tinybeans or  G2Link software packages by  protocol.                   COMPARISON: CT same day      FINDINGS:      VASCULAR:     Right ICA:  Moderate calcifications involving the cavernous, ophthalmic,  and clinoid segments. This limits assessment but no high-grade stenosis  or occlusion is identified.  Right MANDI:  Patent. No significant stenosis or occlusion.  Right MCA: Patent. No significant stenosis or occlusion.  Right PCA:  No significant stenosis or occlusion.  Right P-Comm: Patent.  ------  A-comm: Patent.  ------  Left ICA:  Dense calcifications mainly in the left supraclinoid segments  which limits assessment and there is probable moderate stenosis noted.  No occlusion identified.  Left MANDI:  Patent. No significant stenosis or occlusion.  Left MCA:  Patent. No sniffing stenosis or occlusion.  Left PCA:  Patent. No significant stenosis or occlusion.  Left P-Comm: Hypoplastic.     Vertebrobasilar system:  Unremarkable. No segments of significant stenosis or occlusion involving  basilar artery. Basilar apex is within normal limits.     NONVASCULAR:     No enhancing brain lesions. No midline shift or hydrocephalus.       Impression:      1. No large vessel occlusion.  2. Moderate short segment stenosis left supraclinoid ICA with  characterization limited by extensive dense calcified plaque.  3. Otherwise unremarkable exam.           This report was finalized on 11/4/2020 7:40 PM by Dr. Enrike Womack MD.       CT Angiogram Carotids [[id number]] Collected: 11/04/20  1934     Updated: 11/04/20 1940    Narrative:      EXAMINATION: CT ANGIOGRAM CAROTIDS-      CLINICAL INDICATION:  Stroke, follow up     TECHNIQUE: Multiple axial CT images obtained of the brain following  administration of IV contrast per CTA carotid protocol. Reformatted CTA  images in the coronal and sagittal planes were generated from the axial  data set to facilitate diagnostic accuracy and/or surgical planning.  Volume Rendered 3D or MIP images performed.      Radiation dose reduction techniques were utilized per ALARA protocol.  Automated exposure control was initiated through either or Stonewedge or  Youtopia software packages by  protocol.                Stenosis measurements if obtained, were performed by the NASCET or  similar method.     COMPARISON: None.     FINDINGS:      Aortic arch/arch vessel origins: Three-vessel arch configuration. Mild  plaque origin of left subclavian artery with low-grade stenosis. No  hemodynamically significant stenosis. No occlusion.  ------  Right CCA: Without significant stenosis or evidence of dissection.  Right carotid bulb: No significant stenosis.  Right Proximal Internal Carotid Artery: Without significant stenosis or  evidence of dissection.  ------  Left CCA: Without significant stenosis or evidence of dissection.   Left carotid bulb:  No significant stenosis.  Left Proximal Internal Carotid Artery: Without significant stenosis or  evidence of dissection.  ------  Very mild right vertebral dominance. Vertebral arteries demonstrate no  segments of occlusion or significant stenosis.     NONVASCULAR:     Emphysema of the upper lobes. Degenerative changes cervical spine. No  enhancing masses. No fluid collections.       Impression:      1. No segments of significant stenosis or occlusion.  2. Other findings above.     This report was finalized on 11/4/2020 7:37 PM by Dr. Enrike Womack MD.       CT Head Without Contrast Stroke Protocol [[id number]] Collected:  11/04/20 1917     Updated: 11/04/20 1934    Narrative:      EXAMINATION: CT HEAD WO CONTRAST STROKE PROTOCOL-      CLINICAL INDICATION:     Neuro deficit, acute, stroke suspected     COMPARISON:    09/17/2020     Technique: Multiple CT axial images were obtained through the level of  the brain without IV contrast administration. Reformatted images in the  coronal and/or sagittal plane(s) were generated from the axial data set  to facilitate diagnostic accuracy and/or surgical planning.     Radiation dose reduction techniques were utilized per ALARA protocol.  Automated exposure control was initiated through either or y prime or  DoseRight software packages by  protocol.       DOSE (DLP mGy-cm):     FINDINGS:       Extra axial spaces: Normal in size and morphology for the patient's age.  Ventricular system: No evidence of hydrocephalus..  Basal cisterns: Unremarkable. No effacement..  Cerebral parenchyma: No focal areas of [place]. No white matter  abnormalities. No parenchymal hemorrhage..  Midline shift: None.  Cerebellum: Normal.  Visualized brainstem: Normal.  Vascular system: Normal.  Visualized Paranasal sinuses: Clear.  Visualized Orbits: Normal.  Calvarium: Normal.  Sella/skull base/mastoids: Right mastoid effusion is noted..  Visualized soft tissues/scalp: Normal.          Impression:      No CT evidence of acute intracranial abnormality.  Right mastoid effusion.     This report was finalized on 11/4/2020 7:19 PM by Dr. Enrike Womack MD.           I have personally reviewed the above radiology results.     Duplex carotid ultrasound CAR (3/20/2020):        Last Echocardiogram:  Results for orders placed during the hospital encounter of 11/26/18   Adult Transthoracic Echo Complete W/ Cont if Necessary Per Protocol    Narrative · Mild to moderate tricuspid valve regurgitation is present.  · Left ventricular systolic function is normal. Estimated EF = 65%.  · Left atrial cavity size is  borderline dilated.  · There is no evidence of pericardial effusion.  · Normal right ventricular cavity size, wall thickness, systolic function   and septal motion noted.  · Left ventricular diastolic function not assessed on this study.  · The aortic valve exhibits sclerosis.  · Mild pulmonary hypertension is present.  · There is no evidence of pericardial effusion.        ---------------------------------------------------------------------------------------------------------------------    Assessment & Plan      Active Hospital Problems    Diagnosis POA   • **Left arm weakness [R29.898] Yes   • Diastolic CHF, chronic (CMS/HCC) [I50.32] Yes   • History of TIA (transient ischemic attack) [Z86.73] Not Applicable   • History of gastric ulcer [Z87.19] Not Applicable   • Hyperlipidemia LDL goal <70 [E78.5] Yes     · High intensity statin therapy indicated given the presence of coronary artery disease, albeit mild     • Coronary artery disease involving native coronary artery of native heart with angina pectoris (CMS/HCC) [I25.119] Yes     · Cardiac catheterization (10/14/13): Nonobstructive CAD. Normal EF.  · Exercise nuclear stress (11/26/18): Moderate exercise intolerance with development of chest pain and nonsustained VT. Normal perfusion images with no ischemia. LVEF 50%  · Echo (11/26/18):  Normal LVEF.  Mild pulmonary hypertension  · Cardiac catheterization (12/17/18): Mild nonobstructive CAD. Normal LVEF.     • Essential hypertension [I10] Yes     • Target blood pressure <130/80 mmHg       #Left-sided weakness  #Headaches  #Patient reported history of right sided CVA in the past (2005)  #Hyperlipidemia  #Essential hypertension  #Nonobstructive coronary artery disease  -Telemetry neurology was consulted in the emergency department (Dr. Rodrigues) who evaluated the patient  -Noncontrast CT of the head, CT angiogram of the carotids, CT angiogram of the head, and CT cerebral perfusion showed no acute findings  -MRI  requested per neurology to evaluate for stroke and other structural pathology; MRI with and without contrast ordered  -Lipid panel from 10/30/20 reviewed, unremarkable   -Received loading dose aspirin in ED; continue with aspirin and statin; Plavix added per neurology  -In regards to the patient's headaches, neurology felt they were atypical for migraines; treat with migraine cocktail for now  -Obtain neuro checks every 4 hours  -Fall precautions in place; patient is to be up with assistance  -BP is currently controlled with last /57  -Currently awaiting home medication list; review once available    #Chest pain with mixes features present upon admission   #Non-obstructive CAD  #Hyperlipdiemia   -Currently chest pain-free  -Troponin T negative x2; EKG without acute ischemic changes  -Continue to trend troponin and obtain serial EKGs  -Continue aspirin, Plavix, statin as stated above  -Left heart cath from December 2018 reviewed, shows non-obstructive CAD  -Monitor closely    #Chronic diastolic CHF  -No evidence of acute exacerbation at this time  -Echo from November 2018 reviewed, EF 65%  -Monitor for signs of volume overload Daily weights, strict I's and O's    #Suspect CKD stage II (baseline Cr 0.8-0.9)  -Renal function currently at baseline  -Repeat AM CMP     #Prolonged QTC  -QTc 466 MS  -Obtain magnesium and phosphorus level, replace as necessary  -Avoid QTC prolonging agents as much as possible    #Asymptomatic bacteriuria  -UA with 4+ bacteria  -Patient denies any urinary symptoms  -Urine culture ordered     #GERD  #History of gastric ulcer  -Protonix    F/E/N: No IV fluids.  Replace electrolytes as necessary.  NPO.  ---------------------------------------------------  DVT Prophylaxis: Subcutaneous Lovenox  GI Prophylaxis: Protonix  Activity: Up with assistance, fall precautions    The patient is considered to be a high risk patient due to: Left-sided weakness, headache,     INPATIENT status due to the  need for care which can only be reasonably provided in an hospital setting such as aggressive/expedited ancillary services and/or consultation services, the necessity for IV medications, close physician monitoring and/or the possible need for procedures.  In such, I feel patient’s risk for adverse outcomes and need for care warrant INPATIENT evaluation and predict the patient’s care encounter to likely last beyond 2 midnights.    Code Status: FULL CODE     I have discussed the patient's assessment and plan with the patient, nursing staff, and attending physician      Raegan Ayala PA-C  Hospitalist Service -- [facility]       11/05/20  02:19 EST    Attending Physician: Deidra Gore MD        Electronically signed by Raegan Ayala PA-C at 11/05/20 0334          Emergency Department Notes      Silver Smith at 11/04/20 1602        EKG performed @ 1559, Given to Dr. Delgado @ 1601     Silver Smith  11/04/20 1603      Electronically signed by Silver Smith at 11/04/20 1603     Patricia Friedman, RN at 11/04/20 2306        Report called to Kasandra ALLAN on 3S at this time.      Patricia Friedman, RN  11/04/20 2308      Electronically signed by Patricia Friedman RN at 11/04/20 2308     Patricia Friedman, RN at 11/04/20 2313        Pt transferred to inpatient floor at this time. NADN, skin PWD, no resp distress noted. IV intact with no signs of infiltration; magnesium infusing well. All pt belongings transferred with pt. Pt transferred via wheelchair with ER tech.        Patricia Friedman, RN  11/05/20 0448      Electronically signed by Patricia Friedman, RN at 11/05/20 0448       Vital Signs (last day)     Date/Time   Temp   Temp src   Pulse   Resp   BP   Patient Position   SpO2    11/05/20 0630   97.6 (36.4)   Axillary   65   18   116/63   Lying   98    11/05/20 0220   96.6 (35.9)   Oral   71   18   101/57   Lying   98    11/04/20 2329   96.1 (35.6)   Oral   65   18   152/76    Sitting   99    11/04/20 2303   --   --   --   --   127/69   --   100    11/04/20 2248   --   --   --   --   134/70   --   99    11/04/20 2232   --   --   --   --   112/63   --   100    11/04/20 2217   --   --   --   --   127/71   --   98    11/04/20 2203   --   --   --   --   130/73   --   100    11/04/20 2156   --   --   --   --   132/62   --   100    11/04/20 2132   --   --   62   --   138/71   --   100    11/04/20 2117   --   --   59   --   145/73   --   100    11/04/20 2103   --   --   62   --   128/74   --   100    11/04/20 2048   --   --   63   --   143/76   --   99    11/04/20 2033   --   --   66   --   151/79   --   100    11/04/20 2017   --   --   69   --   150/74   --   99    11/04/20 2002   --   --   69   --   141/70   --   99    11/04/20 1947   --   --   73   --   144/73   --   99    11/04/20 1937   --   --   78   --   141/79   --   100    11/04/20 1800   98.8 (37.1)   Tympanic   84   16   142/74   Sitting   98    11/04/20 1559   98.9 (37.2)   Tympanic   80   16   139/70   Sitting   98                Facility-Administered Medications as of 11/5/2020   Medication Dose Route Frequency Provider Last Rate Last Dose   • acetaminophen (TYLENOL) tablet 650 mg  650 mg Oral Q6H PRN Raegan Ayala PA-C       • [COMPLETED] aspirin chewable tablet 324 mg  324 mg Oral Once Denny Fine DO   324 mg at 11/04/20 1949   • [COMPLETED] clopidogrel (PLAVIX) tablet 75 mg  75 mg Oral Once Denny Fine DO   75 mg at 11/04/20 2100   • [COMPLETED] diphenhydrAMINE (BENADRYL) injection 25 mg  25 mg Intravenous Once Denny Fine DO   25 mg at 11/04/20 2101   • enoxaparin (LOVENOX) syringe 40 mg  40 mg Subcutaneous Daily Deidra Gore MD       • [COMPLETED] iopamidol (ISOVUE-300) 61 % injection 100 mL  100 mL Intravenous Once in imaging Denny Fine DO   100 mL at 11/04/20 1937   • [COMPLETED] magnesium sulfate in D5W 1g/100mL (PREMIX)  1 g Intravenous Once Denny Fine DO   1 g at 11/04/20  2227   • nitroglycerin (NITROSTAT) SL tablet 0.4 mg  0.4 mg Sublingual Q5 Min PRN Deidra Gore MD       • pantoprazole (PROTONIX) EC tablet 40 mg  40 mg Oral Q AM Raegan Ayala PA-C       • Pharmacy to Dose enoxaparin (LOVENOX)   Does not apply Continuous PRN Deidra Gore MD       • prochlorperazine (COMPAZINE) injection 10 mg  10 mg Intravenous Q6H PRN Deidra Gore MD   10 mg at 20 2150   • sodium chloride 0.9 % flush 10 mL  10 mL Intravenous PRN Deidra Gore MD       • sodium chloride 0.9 % flush 10 mL  10 mL Intravenous Q12H Deidra Gore MD       • sodium chloride 0.9 % flush 10 mL  10 mL Intravenous PRN Deidra Gore MD         Physician Progress Notes (all)    No notes of this type exist for this encounter.            Consult Notes (all)      Arthur Rodrigues MD at 20      Consult Orders    1. Inpatient Neurology Consult Stroke [[id number]] ordered by Denny Fine DO at 20 1909               Stroke Consult Note    Patient Name: Lyla Vazquez   MRN: [id number]  Age: 70 y.o.  Sex: female  : 1950    Primary Care Physician: Joyce Braden MD  Referring Physician:  No ref. provider found    TIME STROKE TEAM CALLED:  EST     TIME PATIENT SEEN:  EST    Handedness: R  Race: W    Chief Complaint/Reason for Consultation: left sided weakness     Subjective .  HPI:     This is 70-year-old with history of stroke 14 years ago, with residual left-sided weakness, coronary artery disease, hypertension, takes aspirin 81 daily presents today with worsening left-sided weakness.  She claims that yesterday her chest was hurting, followed by swelling of the lips, drooling of the left side of the lip.  She subjectively feels that her left arm is tired and weak than her baseline.    Denies atrial fibrillation.    Headaches-has been having for the past 4 months.  Throbbing, continuous, left-sided, also from occiput, radiates to the left  "frontal region.  She cannot think anything which makes it better.  She also complains of some left eye blurry vision.  Currently she has an intense headache which she claims as \"fist  behind her left ear\".       Last Known Normal Date/Time:  EST     Review of Systems   Constitutional: No fatigue  HENT: Negative for nosebleeds and rhinorrhea.    Eyes: Negative for redness.   Respiratory: Negative for cough.    Gastrointestinal: Negative for anal bleeding.   Endocrine: Negative for polydipsia.   Genitourinary: Negative for enuresis and urgency.   Musculoskeletal: Negative for joint swelling.   Neurological: Negative for tremors.   Psychiatric/Behavioral: Negative for hallucinations.     Temp:  [98.8 °F (37.1 °C)-98.9 °F (37.2 °C)] 98.8 °F (37.1 °C)  Heart Rate:  [80-84] 84  Resp:  [16] 16  BP: (139-142)/(70-74) 142/74      Acute Stroke Data    Alteplase (tPA) Inclusion / Exclusion Criteria    Time: 20:00 EST  Person Administering Scale: Arthur Rodrigues MD    Inclusion Criteria  [x]   18 years of age or greater   []   Onset of symptoms < 4.5 hours before beginning treatment (stroke onset = time patient was last seen well or without symptoms).   []   Diagnosis of acute ischemic stroke causing measurable disabling deficit (Complete Hemianopia, Any Aphasia, Visual or Sensory Extinction, Any weakness limiting sustained effort against gravity)   []   Any remaining deficit considered potentially disabling in view of patient and practitioner   Exclusion criteria (Do not proceed with Alteplase if any are checked under exclusion criteria)  [x]   Onset unknown or GREATER than 4.5 hours   []   ICH on CT/MRI   []   CT demonstrates hypodensity representing acute or subacute infarct   []   Significant head trauma or prior stroke in the previous 3 months   []   Symptoms suggestive of subarachnoid hemorrhage   []   History of un-ruptured intracranial aneurysm GREATER than 10 mm   []   Recent intracranial or intraspinal surgery " within the last 3 months   []   Arterial puncture at a non-compressible site in the previous 7 days   []   Active internal bleeding   []   Acute bleeding tendency   []   Platelet count LESS than 100,000 for known hematological diseases such as leukemia, thrombocytopenia or chronic cirrhosis   []   Current use of anticoagulant with INR GREATER than 1.7 or PT GREATER than 15 seconds, aPTT GREATER than 40 seconds   []   Heparin received within 48 hours, resulting in abnormally elevated aPTT GREATER than upper limit of normal   []   Current use of direct thrombin inhibitors or direct factor Xa inhibitors in the past 48 hours   []   Elevated blood pressure refractory to treatment (systolic GREATER than 185 mm/Hg or diastolic  GREATER than 110 mm/Hg   []   Suspected infective endocarditis and aortic arch dissection   []   Current use of therapeutic treatment dose of low-molecular-weight heparin (LMWH) within the previous 24 hours   []   Structural GI malignancy or bleed   Relative exclusion for all patients  []   Only minor non-disabling symptoms   []   Pregnancy   []   Seizure at onset with postictal residual neurological impairments   []   Major surgery or previous trauma within past 14 days   []   History of previous spontaneous ICH, intracranial neoplasm, or AV malformation   []   Postpartum (within previous 14 days)   []   Recent GI or urinary tract hemorrhage (within previous 21 days)   []   Recent acute MI (within previous 3 months)   []   History of un-ruptured intracranial aneurysm LESS than 10 mm   []   History of ruptured intracranial aneurysm   []   Blood glucose LESS than 50 mg/dL (2.7 mmol/L)   []   Dural puncture within the last 7 days   []   Known GREATER than 10 cerebral microbleeds   Additional exclusions for patients with symptoms onset between 3 and 4.5 hours.  []   Age > 80.   []   On any anticoagulants regardless of INR  >>> Warfarin (Coumadin), Heparin, Enoxaparin (Lovenox), fondaparinux (Arixtra),  bivalirudin (Angiomax), Argatroban, dabigatran (Pradaxa), rivaroxaban (Xarelto), or apixaban (Eliquis)   []   Severe stroke (NIHSS > 25).   []   History of BOTH diabetes and previous ischemic stroke.   []   The risks and benefits have been discussed with the patient or family related to the administration of IV Alteplase for stroke symptoms.   []   I have discussed and reviewed the patient's case and imaging with the attending prior to IV Alteplase.    Time Alteplase administered       Past Medical History:   Diagnosis Date   • Anemia    • Anesthesia complication     HARD TO WAKE   • Arthritis    • Arthritis    • Chronic bronchitis (CMS/HCC)    • Coronary artery disease    • Elevated cholesterol    • Epigastric pain 8/30/2019    Added automatically from request for surgery [id number]   • Gastric ulcer    • GERD (gastroesophageal reflux disease)    • H/O CT scan of brain    • Hematoma of left lower extremity 10/13/2016   • History of transfusion    • Hypertension    • Myocardial infarction (CMS/HCC)    • Numbness    • Stroke (CMS/HCC)     TIA   • Varicose veins    • Wears dentures    • Wears glasses      Past Surgical History:   Procedure Laterality Date   • ABDOMINAL SURGERY     • BRAVO PROCEDURE N/A 9/16/2019    Procedure: ESOPHAGOGASTRODUODENOSCOPY AND FRIEDMAN;  Surgeon: Neil Balderrama MD;  Location: [facility];  Service: Gastroenterology   • CARDIAC CATHETERIZATION     • CARDIAC CATHETERIZATION N/A 12/17/2018    Procedure: Left Heart Cath;  Surgeon: Sandip Zamora IV, MD;  Location:  [facility];  Service: Cardiovascular   • CHOLECYSTECTOMY      laparoscopic   • COLONOSCOPY  2014   • ENDOSCOPY     • ENDOSCOPY N/A 11/11/2019    Procedure: ESOPHAGOGASTRODUODENOSCOPY WITH  DILATATION WITH FLUOROSCOPY;  Surgeon: Neil Balderrama MD;  Location: [facility] OR;  Service: Gastroenterology   • FRACTURE SURGERY     • HYSTERECTOMY      benign   • OTHER SURGICAL HISTORY      leg repair   • STOMACH SURGERY        Family History   Problem Relation Age of Onset   • Diabetes Other    • Hypertension Other    • Ovarian cancer Other    • Cancer Mother         bladder   • Cancer Sister         liver   • Diabetes Sister    • Diabetes Brother    • No Known Problems Father    • Breast cancer Neg Hx      Social History     Socioeconomic History   • Marital status:      Spouse name: Not on file   • Number of children: Not on file   • Years of education: Not on file   • Highest education level: Not on file   Tobacco Use   • Smoking status: Never Smoker   • Smokeless tobacco: Never Used   Substance and Sexual Activity   • Alcohol use: No   • Drug use: No   • Sexual activity: Defer     Allergies   Allergen Reactions   • Sulfa Antibiotics Anaphylaxis     Prior to Admission medications    Medication Sig Start Date End Date Taking? Authorizing Provider   aspirin (aspirin) 81 MG EC tablet Take 1 tablet by mouth Daily. 10/30/20   Sandip Zamora IV, MD   atorvastatin (LIPITOR) 20 MG tablet Take 1 tablet by mouth Every Night. 10/30/20   Sandip Zamora IV, MD   azelastine (ASTELIN) 0.1 % nasal spray 2 sprays into the nostril(s) as directed by provider As Needed for Rhinitis. Use in each nostril as directed    Compa Lerma MD   cetirizine (zyrTEC) 10 MG tablet Take 10 mg by mouth Daily.    Compa Lerma MD   cyclobenzaprine (FLEXERIL) 10 MG tablet Take 10 mg by mouth Every Night.    Compa Lerma MD   estrogens, conjugated, (PREMARIN) 0.625 MG tablet Take 1 tablet by mouth daily. 5/13/14   Compa Lerma MD   gabapentin (NEURONTIN) 300 MG capsule Take 600 mg by mouth 3 (Three) Times a Day. 5/13/14   Compa Lerma MD   HYDROcodone-acetaminophen (NORCO)  MG per tablet Take 1 tablet by mouth 2 (Two) Times a Day. Take 1 tablet every 4 to 6 hours as needed for pain 5/13/14   Compa Lerma MD   lisinopril-hydrochlorothiazide (PRINZIDE,ZESTORETIC) 20-25 MG per tablet  Take 1 tablet by mouth Daily. 10/30/20   Sandip Zamora IV, MD   pantoprazole (PROTONIX) 40 MG EC tablet Take 40 mg by mouth Daily.    Provider, MD Compa       [facility] Meds:  Scheduled-    Infusions-     PRNs- •  Insert peripheral IV **AND** sodium chloride    Functional Status Prior to Current Stroke/Gile Score: 0    .  1a  Level of consciousness: 0=alert; keenly responsive   1b. LOC questions:  0=Performs both tasks correctly   1c. LOC commands: 0=Performs both tasks correctly   2.  Best Gaze: 0=normal   3.  Visual: 0=No visual loss   4. Facial Palsy: 0=Normal symmetric movement   5a.  Motor left arm: 0=No drift, limb holds 90 (or 45) degrees for full 10 seconds   5b.  Motor right arm: 0=No drift, limb holds 90 (or 45) degrees for full 10 seconds   6a. motor left le=No drift, limb holds 90 (or 45) degrees for full 10 seconds   6b  Motor right le=No drift, limb holds 90 (or 45) degrees for full 10 seconds   7. Limb Ataxia: 0=Absent   8.  Sensory: 0=Normal; no sensory loss   9. Best Language:  0=No aphasia, normal   10. Dysarthria: 0=Normal   11. Extinction and Inattention: 0=No abnormality    Total:   0     NIH Stroke Scale  Time: 20:00 EST  Person Administering Scale: Arthur Rodrigues MD    Results Reviewed:  I have personally reviewed current lab, radiology, and data and agree with results.    Results for orders placed during the hospital encounter of 18   Adult Transthoracic Echo Complete W/ Cont if Necessary Per Protocol    Narrative · Mild to moderate tricuspid valve regurgitation is present.  · Left ventricular systolic function is normal. Estimated EF = 65%.  · Left atrial cavity size is borderline dilated.  · There is no evidence of pericardial effusion.  · Normal right ventricular cavity size, wall thickness, systolic function   and septal motion noted.  · Left ventricular diastolic function not assessed on this study.  · The aortic valve exhibits sclerosis.  · Mild  pulmonary hypertension is present.  · There is no evidence of pericardial effusion.               Assessment/Plan:  This is 70-year-old right-handed white female with history of hyperlipidemia, GERD, hypertension, coronary artery disease presents with left-sided symptoms.  Patient not a candidate for any acute stroke intervention therapies [out of the window for TPA,  No LVO]          Diagnostics- I personally reviewed all the imaging and labs  -CT head-no acute abnormality  -CT head and neck.-Significant intracranial respiratory disease, left supraclinoid calcified atherosclerosis  -MRI brain-  pending  -Transthoracic echo-   -LDL , A1c        Left-sided weakness- CTA head showed diffuse intracranial atherosclerosis, will obtain MRI brain with and without to evaluate for stroke and other structural pathology.  Please initiate TIA/stroke order set. add Plavix to her current regimen.  We will continue to follow her tomorrow.    Headache disorder, unspecified-patient has been having headache for the past 4 months.  atypical for migrainous.  Not on any prophylaxis or abortive therapy.  Treat her headache with migraine cocktail during hospital stay.  We will further evaluate tomorrow to determine if she needs to be on prophylaxis.    1b. Stroke secondary prevention-continue aspirin Plavix  1c. Stroke recovery- Activity, PT/OT/Speech-we will be transferred today  1d. Stroke education- Educated on modifiable stroke risk factors, adherence to medications                       #2 Hypertension-allow autoregulation, treat only if systolic blood pressure is greater than 200.  #3 Type 2 diabetes-strict glycemic control, goal less than 7  #4 Hyperlipidemia- LDL goal less than < 70. Start high intensity statin        This was a high complex patient and nature of presentation was acute necessitating evaluation for life/limb saving condition. The test results were discussed with the patient/ family and with admitting/primary  physician taking care in the hospital and recommended further diagnostics and management plans in a collaborative fashion.           Arthur Rodrigues MD  November 4, 2020  20:00 EST    Verbal consent taken.  Patient agreeable to be seen via telemedicine.    This was an audio and video enabled telemedicine encounter.            Electronically signed by Arthur Rodrigues MD at 11/04/20 2040

## 2020-11-05 NOTE — PROGRESS NOTES
Discharge Planning Assessment   [facility]     Patient Name: Lyla Vazquez  MRN: [id number]  Today's Date: 11/5/2020    Admit Date: 11/4/2020    Discharge Needs Assessment     Row Name 11/05/20 1242       Living Environment    Lives With  child(lisa), adult    Name(s) of Who Lives With Patient  Lives with son    Current Living Arrangements  home/apartment/condo    Primary Care Provided by  self    Provides Primary Care For  no one    Family Caregiver if Needed  child(lisa), adult    Quality of Family Relationships  helpful;involved;supportive    Able to Return to Prior Arrangements  yes       Transition Planning    Patient/Family Anticipates Transition to  home with family    Patient/Family Anticipated Services at Transition  none    Transportation Anticipated  car, drives self       Discharge Needs Assessment    Equipment Currently Used at Home  none    Concerns to be Addressed  no discharge needs identified    Anticipated Changes Related to Illness  none    Equipment Needed After Discharge  none        Discharge Plan     Row Name 11/05/20 1243       Plan    Plan  Pt admitted on 11/4/20.  SS [place] [facility] [place].  SS spoke with pt on this date.  Pt lives at home with son and plans to return home at discharge.  Pt currently does not utilize home health or DME.  Pt's PCP is Joyce Braden.  Pt stated she continues to work full time and stated no needs.        Continued Care and Services - Admitted Since 11/4/2020    Coordination has not been started for this encounter.         Demographic Summary     Row Name 11/05/20 1241       General Information    Admission Type  inpatient    Referral Source  nursing    Reason for Consult  discharge planning    Preferred Language  English          Dionne James, BSW

## 2020-11-05 NOTE — PROGRESS NOTES
[facility] HOSPITALIST PROGRESS NOTE     Patient Identification:  Name:  Lyla Vazquez  Age:  70 y.o.  Sex:  female  :  1950  MRN:  [id number]  Visit Number:  [id number]  ROOM: [address] [place] [address]     Primary Care Provider:  Joyce Braden MD    Length of stay in inpatient status:  1    Subjective     Chief Compliant:    Chief Complaint   Patient presents with   • Chest Pain   • Facial Swelling       History of Presenting Illness:  Patient seen in follow up for headache, left sided weakness, and vision changes.  Patient states sight back to normal today, no left sided weakness noted, but still with persistent positional headache.     Objective     Current Hospital Meds:aspirin, 81 mg, Oral, Daily  clopidogrel, 75 mg, Oral, Daily  enoxaparin, 40 mg, Subcutaneous, Daily  pantoprazole, 40 mg, Oral, Q AM  sodium chloride, 10 mL, Intravenous, Q12H    Pharmacy to Dose enoxaparin (LOVENOX),         Current Antimicrobial Therapy:  Anti-Infectives (From admission, onward)    None        Current Diuretic Therapy:  Diuretics (From admission, onward)    None        ----------------------------------------------------------------------------------------------------------------------  Vital Signs:  Temp:  [96.1 °F (35.6 °C)-97.6 °F (36.4 °C)] 97.6 °F (36.4 °C)  Heart Rate:  [59-78] 64  Resp:  [18] 18  BP: (101-152)/(57-79) 125/61  SpO2:  [97 %-100 %] 97 %  on   ;   Device (Oxygen Therapy): room air  Body mass index is 29.82 kg/m².    Wt Readings from Last 3 Encounters:   20 89 kg (196 lb 1.6 oz)   10/30/20 88.6 kg (195 lb 6.4 oz)   20 84.8 kg (187 lb)     Intake & Output (last 3 days)       701 -  -  -  - 700            Urine Unmeasured Occurrence   1 x 3 x        Diet Regular; Cardiac  ----------------------------------------------------------------------------------------------------------------------  Physical  exam:  Constitutional:  Well-developed and well-nourished.  No acute distress.      HENT:  Head:  Normocephalic and atraumatic.  Mouth:  Moist mucous membranes.    Eyes:  Conjunctivae and EOM are normal. No scleral icterus.    Neck:  Neck supple.  No JVD present.    Cardiovascular:  Normal rate, regular rhythm and normal heart sounds with no murmur.  Pulmonary/Chest:  No respiratory distress, no wheezes, no crackles, with normal breath sounds and good air movement.  Abdominal:  Soft.  Bowel sounds are normal.  No distension and no tenderness.   Musculoskeletal:  No edema, no tenderness, and no deformity.  No red or swollen joints anywhere.  Functional ROM intact.   Neurological:  Alert and oriented to person, place, and time.  No cranial nerve deficit.  No tongue deviation.  No facial droop.  No slurred speech. Intact Sensation throughout  Skin:  Skin is warm and dry. No rash or lesion noted. No pallor.   Peripheral vascular:  Pulses in all 4 extremities with no clubbing, no cyanosis, no edema.  Psychiatric: Appropriate mood and affect, pleasant.   ----------------------------------------------------------------------------------------------------------------------  Tele:    ----------------------------------------------------------------------------------------------------------------------  Results from last 7 days   Lab Units 11/05/20  0525 11/04/20  2000 11/04/20  1935 10/30/20  1551   CRP mg/dL  --  0.11  --   --    LACTATE mmol/L  --  1.3  --   --    WBC 10*3/mm3 3.37*  --  4.67 2.89*   HEMOGLOBIN g/dL 8.8*  --  10.5* 10.0*   HEMATOCRIT % 30.5*  --  34.5 31.7*   MCV fL 88.2  --  86.3 80.7   MCHC g/dL 29.1*  --  30.4* 31.5   PLATELETS 10*3/mm3 135*  --  163 144   INR   --   --  0.99  --          Results from last 7 days   Lab Units 11/05/20  0525 11/04/20  2000 10/30/20  1551   SODIUM mmol/L 137 137 142   POTASSIUM mmol/L 3.3* 3.6 3.9   MAGNESIUM mg/dL 2.1 2.0  --    CHLORIDE mmol/L 104 103 104   TOTAL CO2  mmol/L  --   --  29.9*   CO2 mmol/L 26.3 27.4  --    BUN mg/dL 12 14 18   CREATININE mg/dL 0.80 0.83 0.87   EGFR IF NONAFRICN AM mL/min/1.73 71 68 65   EGFR IF AFRICN AM mL/min/1.73  --   --  78   CALCIUM mg/dL 8.8 9.2 8.8   PHOSPHORUS mg/dL 2.8 2.6  --    GLUCOSE mg/dL 93 84  --    ALBUMIN g/dL 3.15* 3.78 3.80   BILIRUBIN mg/dL 0.4 0.4 0.4   ALK PHOS U/L 87 99 113   AST (SGOT) U/L 12 12 14   ALT (SGPT) U/L 5 5 8   Estimated Creatinine Clearance: 78.5 mL/min (by C-G formula based on SCr of 0.8 mg/dL).  No results found for: AMMONIA  Results from last 7 days   Lab Units 11/05/20  0525 11/04/20  2244 11/04/20 2000   TROPONIN T ng/mL <0.010 <0.010 <0.010     Results from last 7 days   Lab Units 11/04/20 2000   PROBNP pg/mL 1,297.0*     Results from last 7 days   Lab Units 10/30/20  1551   TRIGLYCERIDES mg/dL 43   HDL CHOL mg/dL 84*   LDL CHOL mg/dL 29     Glucose   Date/Time Value Ref Range Status   11/04/2020 1909 86 70 - 130 mg/dL Final     Lab Results   Component Value Date    TSH 1.770 11/04/2020     No results found for: PREGTESTUR, PREGSERUM, HCG, HCGQUANT  Pain Management Panel     Pain Management Panel Latest Ref Rng & Units 11/4/2020    AMPHETAMINES SCREEN, URINE Negative Negative    BARBITURATES SCREEN Negative Negative    BENZODIAZEPINE SCREEN, URINE Negative Negative    BUPRENORPHINEUR Negative Negative    COCAINE SCREEN, URINE Negative Negative    METHADONE SCREEN, URINE Negative Negative        Brief Urine Lab Results  (Last result in the past 365 days)      Color   Clarity   Blood   Leuk Est   Nitrite   Protein   CREAT   Urine HCG        11/04/20 2154 Yellow Clear Negative Negative Positive Negative             No results found for: BLOODCX  Urine Culture   Date Value Ref Range Status   11/04/2020 >100,000 CFU/mL Escherichia coli (A)  Preliminary     No results found for: WOUNDCX  No results found for: STOOLCX  No results found for: RESPCX  No results found for: AFBCX  Results from last 7 days   Lab  Units 11/04/20 2000 11/04/20  1935   LACTATE mmol/L 1.3  --    SED RATE mm/hr  --  13   CRP mg/dL 0.11  --        I have personally looked at the labs and they are summarized above.  ----------------------------------------------------------------------------------------------------------------------  Detailed radiology reports for the last 24 hours:    Imaging Results (Last 24 Hours)     Procedure Component Value Units Date/Time    MRI Brain With & Without Contrast [[id number]] Collected: 11/05/20 1808     Updated: 11/05/20 1812    Narrative:      EXAMINATION: MRI BRAIN W WO CONTRAST-      CLINICAL INDICATION:  Neuro deficit, acute, stroke suspected;  I63.50-Cerebral infarction due to unspecified occlusion or stenosis of  unspecified cerebral artery; R51.9-Headache, unspecified       TECHNIQUE: The study was undertaken in the sagittal, axial, and coronal  planes utilizing spin echo, FLAIR, and diffusion weighted sequences both  with and without 18 mL IV MultiHance contrast administration.      COMPARISON: NONE      FINDINGS:     No acute intracranial abnormality.  No midline shift or mass effect.  No hydrocephalus or extra-axial fluid collection.  No restricted diffusion to indicate changes of acute infarct.  Flow related signal in the major intracranial vessels is preserved.  Sella and suprasellar aspects are unremarkable.  Visualized portions of posterior fossa are within normal limits.  The visualized orbits are unremarkable.  Advanced left frontal sinusitis.     Post contrast sequences demonstrate no enhancing brain parenchymal  lesions. No enhancing extra-axial fluid collections. No abnormal  leptomeningeal thickening with enhancement identified.       Impression:      1. No acute intracranial abnormality.  2. No pathologic contrast enhancement.  3. Advanced left frontal sinusitis. Otherwise unremarkable exam.     This report was finalized on 11/5/2020 6:10 PM by Dr. Enrike Womack MD.       MRI Orbit Face  Neck With & Without Contrast [[id number]] Resulted: 11/05/20 1804     Updated: 11/05/20 1732    CT Chest Without Contrast [[id number]] Collected: 11/05/20 1457     Updated: 11/05/20 1502    Narrative:      EXAM: CT CHEST WO CONTRAST-            CLINICAL INDICATION:Check for previously seen recording/monitoring  device in the esophagus; I63.50-Cerebral infarction due to unspecified  occlusion or stenosis of unspecified cerebral artery; R51.9-Headache,  unspecified      COMPARISON: NONE.     TECHNIQUE: Multiple axial CT images were obtained from lung apex through  upper abdomen WITHOUT administration of IV contrast. Reformatted images  in the coronal and/or sagittal plane(s) were generated from the axial  data set to facilitate diagnostic accuracy and/or surgical planning.     Radiation dose reduction techniques were utilized per ALARA protocol.  Automated exposure control was initiated through either or [employer] or  DoseRight software packages by  protocol.    DOSE (DLP mGy-cm):        FINDINGS:     Lungs: Chronic interstitial lung changes.  Heart: Mild coronary artery calcifications.  Pericardium: No effusion.  Mediastinum: No masses. No enlarged lymph nodes.  No fluid collections.  Pleura: No pleural effusion. No pleural mass or abnormal calcification.  No pneumothorax.  Major airways: Clear. No intrinsic mass.  Vasculature: No evidence of aneurysm.  Visualized upper abdomen:Mildly dilated esophagus with gastroesophageal  reflux noted. Surgical clips at the GE junction from previous surgery.  No radiopaque foreign body or implant device seen in the esophagus. FAT  only containing abdominal wall hernia superior abdomen.  Other: None.  Bones: Areas of sclerosis involving the thoracic spine vertebral bodies  are predominantly centered at the disc spaces and more favorable for  Modic type changes but there is a focal sclerotic lesion involving the  left T7 vertebral body but is less determinant and should be  further  evaluated with bone scan.       Impression:      1. No implant device seen or radiopaque foreign body within the  esophagus.  2. Post surgical changes at the GE junction.  3. Dilated esophagus with gastroesophageal reflux noted.  4. Degenerative endplate Modic changes thoracic spine. Lytic lesion left  aspect of T7 less determinant and should be further evaluated with bone  scan.  5. Other nonacute findings as above.     This report was finalized on 11/5/2020 3:00 PM by Dr. Enrike Womack MD.       XR Chest 1 View [[id number]] Collected: 11/04/20 2044     Updated: 11/04/20 2046    Narrative:      EXAMINATION: XR CHEST 1 VW-      CLINICAL INDICATION:     chest pain     TECHNIQUE:  XR CHEST 1 VW-      COMPARISON: 07/07/2014      FINDINGS:      Lungs are aerated.   Heart size, mediastinum, and pulmonary vascularity are unremarkable.   No pneumothorax.   No effusions.   No acute osseous findings.          Impression:      No radiographic evidence of acute cardiac or pulmonary  disease.     This report was finalized on 11/4/2020 8:44 PM by Dr. Enrike Womack MD.       CT Cerebral Perfusion With & Without Contrast [[id number]] Collected: 11/04/20 1949     Updated: 11/04/20 1954    Narrative:      EXAMINATION: CT CEREBRAL PERFUSION W WO CONTRAST-      CLINICAL INDICATION: Neuro deficit, acute, stroke suspected     COMPARISON: CT same day      DOSE: 164.26     TECHNIQUE: Axial CT images of the brain were obtained after IV  administration of 100 mL Isovue-300 contrast given at a rate of 5 mL/s.  Calculations were performed using [employer] perfusion software.     Radiation dose reduction techniques were utilized per ALARA protocol.  Automated exposure control was initiated through either or [email or web address] or  DoseRight software packages by  protocol.           FINDINGS:     Ischemic tissue volume (Tmax > 6 seconds, includes core and penumbra): 0  cc  Ischemic core volume (rCBF < 30%): 0 cc  Mismatch (penumbra)  volume :0 cc  Mismatch ratio: N/A       Impression:      Normal CT perfusion.     This report was finalized on 11/4/2020 7:52 PM by Dr. Enrike Womack MD.       CT Angiogram Head [[id number]] Collected: 11/04/20 1937     Updated: 11/04/20 1942    Narrative:      CLINICAL INDICATION: Stroke      TECHNIQUE: Multiple axial CT images obtained of the brain following  administration of IV contrast per CTA head protocol. Reformatted CTA  images in the coronal and sagittal planes were generated from the axial  data set to facilitate diagnostic accuracy and/or surgical planning.  Volume Rendered 3D or MIP images performed.     Radiation dose reduction techniques were utilized per ALARA protocol.  Automated exposure control was initiated through either or [employer] or  [employer] software packages by  protocol.                   COMPARISON: CT same day      FINDINGS:      VASCULAR:     Right ICA:  Moderate calcifications involving the cavernous, ophthalmic,  and clinoid segments. This limits assessment but no high-grade stenosis  or occlusion is identified.  Right MANDI:  Patent. No significant stenosis or occlusion.  Right MCA: Patent. No significant stenosis or occlusion.  Right PCA:  No significant stenosis or occlusion.  Right P-Comm: Patent.  ------  A-comm: Patent.  ------  Left ICA:  Dense calcifications mainly in the left supraclinoid segments  which limits assessment and there is probable moderate stenosis noted.  No occlusion identified.  Left MANDI:  Patent. No significant stenosis or occlusion.  Left MCA:  Patent. No sniffing stenosis or occlusion.  Left PCA:  Patent. No significant stenosis or occlusion.  Left P-Comm: Hypoplastic.     Vertebrobasilar system:  Unremarkable. No segments of significant stenosis or occlusion involving  basilar artery. Basilar apex is within normal limits.     NONVASCULAR:     No enhancing brain lesions. No midline shift or hydrocephalus.       Impression:      1. No large vessel  occlusion.  2. Moderate short segment stenosis left supraclinoid ICA with  characterization limited by extensive dense calcified plaque.  3. Otherwise unremarkable exam.           This report was finalized on 11/4/2020 7:40 PM by Dr. Enrike Womack MD.       CT Angiogram Carotids [[id number]] Collected: 11/04/20 1934     Updated: 11/04/20 1940    Narrative:      EXAMINATION: CT ANGIOGRAM CAROTIDS-      CLINICAL INDICATION:  Stroke, follow up     TECHNIQUE: Multiple axial CT images obtained of the brain following  administration of IV contrast per CTA carotid protocol. Reformatted CTA  images in the coronal and sagittal planes were generated from the axial  data set to facilitate diagnostic accuracy and/or surgical planning.  Volume Rendered 3D or MIP images performed.      Radiation dose reduction techniques were utilized per ALARA protocol.  Automated exposure control was initiated through either or [employer] or  DoseRight software packages by  protocol.                Stenosis measurements if obtained, were performed by the NASCET or  similar method.     COMPARISON: None.     FINDINGS:      Aortic arch/arch vessel origins: Three-vessel arch configuration. Mild  plaque origin of left subclavian artery with low-grade stenosis. No  hemodynamically significant stenosis. No occlusion.  ------  Right CCA: Without significant stenosis or evidence of dissection.  Right carotid bulb: No significant stenosis.  Right Proximal Internal Carotid Artery: Without significant stenosis or  evidence of dissection.  ------  Left CCA: Without significant stenosis or evidence of dissection.   Left carotid bulb:  No significant stenosis.  Left Proximal Internal Carotid Artery: Without significant stenosis or  evidence of dissection.  ------  Very mild right vertebral dominance. Vertebral arteries demonstrate no  segments of occlusion or significant stenosis.     NONVASCULAR:     Emphysema of the upper lobes. Degenerative  changes cervical spine. No  enhancing masses. No fluid collections.       Impression:      1. No segments of significant stenosis or occlusion.  2. Other findings above.     This report was finalized on 11/4/2020 7:37 PM by Dr. Enrike Womack MD.       CT Head Without Contrast Stroke Protocol [[id number]] Collected: 11/04/20 1917     Updated: 11/04/20 1934    Narrative:      EXAMINATION: CT HEAD WO CONTRAST STROKE PROTOCOL-      CLINICAL INDICATION:     Neuro deficit, acute, stroke suspected     COMPARISON:    09/17/2020     Technique: Multiple CT axial images were obtained through the level of  the brain without IV contrast administration. Reformatted images in the  coronal and/or sagittal plane(s) were generated from the axial data set  to facilitate diagnostic accuracy and/or surgical planning.     Radiation dose reduction techniques were utilized per ALARA protocol.  Automated exposure control was initiated through either or [employer] or  DoseRight software packages by  protocol.       DOSE (DLP mGy-cm):     FINDINGS:       Extra axial spaces: Normal in size and morphology for the patient's age.  Ventricular system: No evidence of hydrocephalus..  Basal cisterns: Unremarkable. No effacement..  Cerebral parenchyma: No focal areas of mass effect. No white matter  abnormalities. No parenchymal hemorrhage..  Midline shift: None.  Cerebellum: Normal.  Visualized brainstem: Normal.  Vascular system: Normal.  Visualized Paranasal sinuses: Clear.  Visualized Orbits: Normal.  Calvarium: Normal.  Sella/skull base/mastoids: Right mastoid effusion is noted..  Visualized soft tissues/scalp: Normal.          Impression:      No CT evidence of acute intracranial abnormality.  Right mastoid effusion.     This report was finalized on 11/4/2020 7:19 PM by Dr. Enrike Womack MD.           Assessment & Plan      Left Sided Weakness, resolved  Persistent Headache   - weakness has resolved   - CTA of head and neck shows  diffuse atherosclerosis but no acute processes, MRI brain with and without negative for any acute abnormalities as well.     - continue ASA and Plavix per Neuro recommendations given degree of atherscelerosis   - given 4 months of headache do worry about other processes such as mass, optical neuritis given intial vision change, as well as autoimmune processes such as giant cell arteritis   - MRI of orbits pending at this time   - will check ESR and CRP level, patient without any tenderness at the temples with palpation   - stroke essentially ruled out at this pint   - Teleneurology consulted and following alone, appreciate their help    Non-obstructive CAD  Hyperlipidemia   - ASA and Plavix as above, continue statin    Chronic Heart Failure with Preserved EF   - clinically euvolemic, continue to monitor for now    CKD Stage II  Asymptomatic Bacteriuria .   - no treatment for now, urine culture pending     GERD   - home protonix      VTE Prophylaxis:   Mechanical Order History:     None      Pharmalogical Order History:      Ordered     Dose Route Frequency Stop    11/05/20 0021  enoxaparin (LOVENOX) syringe 40 mg      40 mg SC Daily --    11/05/20 0020  Pharmacy to Dose enoxaparin (LOVENOX)     Question:  Indication of use  Answer:  Prophylaxis    -- XX Continuous PRN --                Garry Brennan DO  [facility] Hospitalist  11/05/20  18:16 EST

## 2020-11-05 NOTE — ED NOTES
Pt transferred to inpatient floor at this time. NADN, skin PWD, no resp distress noted. IV intact with no signs of infiltration; magnesium infusing well. All pt belongings transferred with pt. Pt transferred via wheelchair with MICHEL winters.        Patricia Friedman RN  11/05/20 2986

## 2020-11-06 VITALS
HEART RATE: 67 BPM | OXYGEN SATURATION: 97 % | TEMPERATURE: 97 F | SYSTOLIC BLOOD PRESSURE: 122 MMHG | DIASTOLIC BLOOD PRESSURE: 60 MMHG | BODY MASS INDEX: 29.72 KG/M2 | RESPIRATION RATE: 18 BRPM | WEIGHT: 196.1 LBS | HEIGHT: 68 IN

## 2020-11-06 LAB
BACTERIA SPEC AEROBE CULT: ABNORMAL
BASOPHILS # BLD AUTO: 0.04 10*3/MM3 (ref 0–0.2)
BASOPHILS NFR BLD AUTO: 1.4 % (ref 0–1.5)
CRP SERPL-MCNC: 0.08 MG/DL (ref 0–0.5)
DEPRECATED RDW RBC AUTO: 63.9 FL (ref 37–54)
EOSINOPHIL # BLD AUTO: 0.03 10*3/MM3 (ref 0–0.4)
EOSINOPHIL NFR BLD AUTO: 1 % (ref 0.3–6.2)
ERYTHROCYTE [DISTWIDTH] IN BLOOD BY AUTOMATED COUNT: 19.9 % (ref 12.3–15.4)
ERYTHROCYTE [SEDIMENTATION RATE] IN BLOOD: 13 MM/HR (ref 0–30)
HCT VFR BLD AUTO: 31.2 % (ref 34–46.6)
HGB BLD-MCNC: 9.2 G/DL (ref 12–15.9)
IMM GRANULOCYTES # BLD AUTO: 0.01 10*3/MM3 (ref 0–0.05)
IMM GRANULOCYTES NFR BLD AUTO: 0.3 % (ref 0–0.5)
LYMPHOCYTES # BLD AUTO: 0.91 10*3/MM3 (ref 0.7–3.1)
LYMPHOCYTES NFR BLD AUTO: 31.2 % (ref 19.6–45.3)
MCH RBC QN AUTO: 25.7 PG (ref 26.6–33)
MCHC RBC AUTO-ENTMCNC: 29.5 G/DL (ref 31.5–35.7)
MCV RBC AUTO: 87.2 FL (ref 79–97)
MONOCYTES # BLD AUTO: 0.22 10*3/MM3 (ref 0.1–0.9)
MONOCYTES NFR BLD AUTO: 7.5 % (ref 5–12)
NEUTROPHILS NFR BLD AUTO: 1.71 10*3/MM3 (ref 1.7–7)
NEUTROPHILS NFR BLD AUTO: 58.6 % (ref 42.7–76)
NRBC BLD AUTO-RTO: 0 /100 WBC (ref 0–0.2)
PLATELET # BLD AUTO: 138 10*3/MM3 (ref 140–450)
PMV BLD AUTO: 12.6 FL (ref 6–12)
RBC # BLD AUTO: 3.58 10*6/MM3 (ref 3.77–5.28)
WBC # BLD AUTO: 2.92 10*3/MM3 (ref 3.4–10.8)

## 2020-11-06 PROCEDURE — 99233 SBSQ HOSP IP/OBS HIGH 50: CPT | Performed by: STUDENT IN AN ORGANIZED HEALTH CARE EDUCATION/TRAINING PROGRAM

## 2020-11-06 PROCEDURE — 85025 COMPLETE CBC W/AUTO DIFF WBC: CPT | Performed by: STUDENT IN AN ORGANIZED HEALTH CARE EDUCATION/TRAINING PROGRAM

## 2020-11-06 PROCEDURE — 85652 RBC SED RATE AUTOMATED: CPT | Performed by: STUDENT IN AN ORGANIZED HEALTH CARE EDUCATION/TRAINING PROGRAM

## 2020-11-06 PROCEDURE — 25010000002 ENOXAPARIN PER 10 MG: Performed by: INTERNAL MEDICINE

## 2020-11-06 PROCEDURE — 86140 C-REACTIVE PROTEIN: CPT | Performed by: STUDENT IN AN ORGANIZED HEALTH CARE EDUCATION/TRAINING PROGRAM

## 2020-11-06 PROCEDURE — 99239 HOSP IP/OBS DSCHRG MGMT >30: CPT | Performed by: STUDENT IN AN ORGANIZED HEALTH CARE EDUCATION/TRAINING PROGRAM

## 2020-11-06 PROCEDURE — 94799 UNLISTED PULMONARY SVC/PX: CPT

## 2020-11-06 RX ORDER — DIVALPROEX SODIUM 250 MG/1
250 TABLET, DELAYED RELEASE ORAL EVERY 12 HOURS SCHEDULED
Status: DISCONTINUED | OUTPATIENT
Start: 2020-11-06 | End: 2020-11-06 | Stop reason: HOSPADM

## 2020-11-06 RX ORDER — RIBOFLAVIN (VITAMIN B2) 400 MG
400 TABLET ORAL DAILY
Qty: 30 TABLET | Refills: 0 | Status: SHIPPED | OUTPATIENT
Start: 2020-11-06 | End: 2021-01-11

## 2020-11-06 RX ORDER — DIVALPROEX SODIUM 250 MG/1
250 TABLET, DELAYED RELEASE ORAL EVERY 12 HOURS SCHEDULED
Qty: 60 TABLET | Refills: 0 | Status: SHIPPED | OUTPATIENT
Start: 2020-11-06 | End: 2021-01-11

## 2020-11-06 RX ORDER — BUTALBITAL, ACETAMINOPHEN AND CAFFEINE 50; 325; 40 MG/1; MG/1; MG/1
1 TABLET ORAL ONCE
Status: COMPLETED | OUTPATIENT
Start: 2020-11-06 | End: 2020-11-06

## 2020-11-06 RX ORDER — DIVALPROEX SODIUM 250 MG/1
250 TABLET, DELAYED RELEASE ORAL 2 TIMES DAILY
Qty: 60 TABLET | Refills: 0 | Status: CANCELLED | OUTPATIENT
Start: 2020-11-06 | End: 2020-12-06

## 2020-11-06 RX ORDER — CLOPIDOGREL BISULFATE 75 MG/1
75 TABLET ORAL DAILY
Qty: 30 TABLET | Refills: 1 | Status: SHIPPED | OUTPATIENT
Start: 2020-11-07 | End: 2021-01-11

## 2020-11-06 RX ORDER — MAGNESIUM OXIDE 400 MG/1
400 TABLET ORAL DAILY
Qty: 30 TABLET | Refills: 0 | Status: SHIPPED | OUTPATIENT
Start: 2020-11-06 | End: 2021-01-11

## 2020-11-06 RX ADMIN — SODIUM CHLORIDE, PRESERVATIVE FREE 10 ML: 5 INJECTION INTRAVENOUS at 09:08

## 2020-11-06 RX ADMIN — ENOXAPARIN SODIUM 40 MG: 40 INJECTION SUBCUTANEOUS at 09:08

## 2020-11-06 RX ADMIN — CETIRIZINE HYDROCHLORIDE 10 MG: 10 TABLET, FILM COATED ORAL at 09:08

## 2020-11-06 RX ADMIN — ATORVASTATIN CALCIUM 20 MG: 20 TABLET, FILM COATED ORAL at 09:08

## 2020-11-06 RX ADMIN — ASPIRIN 81 MG: 81 TABLET, COATED ORAL at 09:08

## 2020-11-06 RX ADMIN — MAGNESIUM GLUCONATE 500 MG ORAL TABLET 400 MG: 500 TABLET ORAL at 12:22

## 2020-11-06 RX ADMIN — PANTOPRAZOLE SODIUM 40 MG: 40 TABLET, DELAYED RELEASE ORAL at 05:26

## 2020-11-06 RX ADMIN — CLOPIDOGREL 75 MG: 75 TABLET, FILM COATED ORAL at 09:08

## 2020-11-06 RX ADMIN — BUTALBITAL, ACETAMINOPHEN, AND CAFFEINE 1 TABLET: 50; 325; 40 TABLET ORAL at 09:19

## 2020-11-06 RX ADMIN — DIVALPROEX SODIUM 250 MG: 250 TABLET, DELAYED RELEASE ORAL at 12:22

## 2020-11-06 NOTE — DISCHARGE INSTR - ACTIVITY
Activity as Tolerated   Instructions for Patients Awaiting COVID-19 Test Results    You will either be called with your test result or it will be released to the patient portal.  If you have any questions about your test, please visit [email or web address] or call our COVID-19 information line at [phone].    Prevention steps for patients with confirmed or suspected COVID-19     Stay home except to get medical care.   Separate yourself from other people and animals in your home   Call ahead before visiting your doctor.   Wear a facemask.   Cover your coughs and sneezes.   Clean your hands often.   Avoid sharing personal household items.   Clean all high-touch surfaces every day.   Monitor your symptoms. Seek prompt medical attention if your illness is worsening (e.g., difficulty breathing). Before seeking care, call your healthcare provider.   If you have a medical emergency and need to call 911, notify the dispatch personnel that you have, or are being evaluated for COVID-19. If possible, put on a facemask before emergency medical services arrive.   Discontinuing home isolation. Call your provider about guidance to discontinue home isolation.    Recommended precautions for household members, intimate partners, and caregivers in a nonhealthcare setting of a patient with symptomatic laboratory-confirmed COVID-19 or a patient under investigation.  Household members, intimate partners, and caregivers in a nonhealthcare setting may have close contact with a person with symptomatic, laboratory-confirmed COVID-19 or a person under investigation. Close contacts should monitor their health; they should call their healthcare provider right away if they develop symptoms suggestive of COVID-19 (e.g., fever, cough, shortness of breath).    Close contacts should also follow these recommendations:   Make sure that you understand and can help the patient follow their healthcare provider's instructions for medication(s) and care.  You should help the patient with basic needs in the home and provide support for getting groceries, prescriptions, and other personal needs.   Monitor the patient's symptoms. If the patient is getting sicker, call his or her healthcare provider and tell them that the patient has laboratory-confirmed COVID-19. This will help the healthcare provider's office take steps to keep other people in the office or waiting room from getting infected. Ask the healthcare provider to call the local or [facility] health department for additional guidance. If the patient has a medical emergency and you need to call 911, notify the dispatch personnel that the patient has, or is being evaluated for COVID-19.   Household members should stay in another room or be  from the patient as much as possible. Household members should use a separate bedroom and bathroom, if available.   Prohibit visitors who do not have an essential need to be in the home.   Household members should care for any pets in the home. Do not handle pets or other animals while sick.   Make sure that shared spaces in the home have good air flow, such as by an air conditioner or an opened window, weather permitting.   Perform hand hygiene frequently. Wash your hands often with soap and water for at least 20 seconds or use an alcohol-based hand  that contains 60 to 95% alcohol, covering all surfaces of your hands and rubbing them together until they feel dry. Soap and water should be used preferentially if hands are visibly dirty.   Avoid touching your eyes, nose, and mouth with unwashed hands.   The patient should wear a facemask when you are around other people. If the patient is not able to wear a facemask (for example, because it causes trouble breathing), you, as the caregiver should wear a mask when you are in the same room as the patient.   Wear a disposable facemask and gloves when you touch or have contact with the patient's blood, stool,  or body fluids, such as saliva, sputum, nasal mucus, vomit, urine.  o Throw out disposable facemasks and gloves after using them. Do not reuse.  o When removing personal protective equipment, first remove and dispose of gloves. Then, immediately clean your hands with soap and water or alcohol-based hand . Next, remove and dispose of facemask, and immediately clean your hands again with soap and water or alcohol-based hand .   Avoid sharing household items with the patient. You should not share dishes, drinking glasses, cups, eating utensils, towels, bedding, or other items. After the patient uses these items, you should wash them thoroughly (see below Wash laundry thoroughly).   Clean all high-touch surfaces, such as counters, tabletops, doorknobs, bathroom fixtures, toilets, phones, keyboards, tablets, and bedside tables, every day. Also, clean any surfaces that may have blood, stool, or body fluids on them.   Use a household cleaning spray or wipe, according to the label instructions. Labels contain instructions for safe and effective use of the cleaning product including precautions you should take when applying the product, such as wearing gloves and making sure you have good ventilation during use of the product.   Wash laundry thoroughly.  o Immediately remove and wash clothes or bedding that have blood, stool, or body fluids on them.  o Wear disposable gloves while handling soiled items and keep soiled items away from your body. Clean your hands (with soap and water or an alcohol-based hand ) immediately after removing your gloves.  o Read and follow directions on labels of laundry or clothing items and detergent. In general, using a normal laundry detergent according to washing machine instructions and dry thoroughly using the warmest temperatures recommended on the clothing label.   Place all used disposable gloves, facemasks, and other contaminated items in a lined  container before disposing of them with other household waste. Clean your hands (with soap and water or an alcohol-based hand ) immediately after handling these items. Soap and water should be used preferentially if hands are visibly dirty.   Discuss any additional questions with your state or local health department or healthcare provider. Check available hours when contacting your local health department.    For more information see CDC link below.      https://www.cdc.gov/coronavirus/2019-ncov/hcp/guidance-prevent-spread.html#precautions        Sources:  CDC, [place] [facility] and [facility]        If not allergic,take tylenol (acetominophen) for fever control, chills, or body aches every 4 hours. Do not exceed 4000 mg/ day.If not allergic, take Motrin (Ibuprofen) every 4 hours for fever, chills, pain or inflammation. Do not exceed 2400 mg/day. You can alternate taking tylenol and motrin.    If you were prescribed a narcotic medication, do not drive or operate heavy equipment or machinery while taking these medications.  You must understand that you've received an Urgent Care treatment only and that you may be released before all your medical problems are known or treated. You, the patient, will arrange for follow up care as instructed.  Follow up with your PCP or specialty clinic as directed in the next 1-2 weeks if not improved or as needed.  You can call [phone] to schedule an appointment with the appropriate provider.  If your condition worsens we recommend that you receive another evaluation at the emergency room immediately or contact your primary medical clinics after hours call service to discuss your concerns.    Please return here or go to the Emergency Department for any concerns or worsening of condition.    If you have been referred to another provider and wish to call to check on the status of your referral, please call [facility] at [phone]

## 2020-11-06 NOTE — PLAN OF CARE
Goal Outcome Evaluation:   Pt is resting well in bed at this time. A&Ox4. VS stable. No acute distress or complaints noted. Will continue to monitor and follow plan of care.

## 2020-11-06 NOTE — PROGRESS NOTES
Discharge Planning Assessment   [facility]     Patient Name: Lyla Vazquez  MRN: [id number]  Today's Date: 11/6/2020    Admit Date: 11/4/2020        Discharge Plan     Row Name 11/06/20 1605       Plan    Final Discharge Disposition Code  01 - home or self-care    Final Note  Pt to be discharged home on this date.    Row Name 11/06/20 1408       Plan    Plan  Pt admitted on 11/4/20.  Pt lives at home with family and plans to return home at discharge.  Pt currently does not utilize home health or DME.  Pt continues to work full time.  Pt stated no needs. SS will follow.              Dionne James, GAUTAMW

## 2020-11-06 NOTE — PROGRESS NOTES
Stroke Progress Note       Chief Complaint:  Headache     Subjective    Subjective     Subjective:  Blurry vision resolved,  Had some relief with Fioricet   But complains of left sided headache    Review of Systems   Constitutional: No fatigue  HENT: Negative for nosebleeds and rhinorrhea.    Eyes: Negative for redness.   Respiratory: Negative for cough.    Gastrointestinal: Negative for anal bleeding.   Endocrine: Negative for polydipsia.   Genitourinary: Negative for enuresis and urgency.   Musculoskeletal: Negative for joint swelling.   Neurological: Negative for tremors.   Psychiatric/Behavioral: Negative for hallucinations.     Objective      Temp:  [97.6 °F (36.4 °C)-98.2 °F (36.8 °C)] 97.7 °F (36.5 °C)  Heart Rate:  [64-72] 67  Resp:  [18] 18  BP: (106-125)/(58-68) 106/59    GEN: left sided headache   Eyes-show anicteric sclera, moist conjunctiva with no lid lag, no redness  Neck-trachea midline.  There is no thyromegaly.  ENMT-oropharynx clear with moist mucous membranes and good dentition.  Skin-no rash, lesions or ulcers.  Cardiovascular exam-no pedal edema, regular rate and rhythm.  CHEST: No signs of resp distress, on room air  Abdomen-no abdominal distention, nontender.  Psychiatric exam-alert oriented x3 with intact judgment and insight      NEURO    MENTAL STATUS: AAOx3, memory intact, fund of knowledge appropriate    LANG/SPEECH: Naming and repetition intact, fluent, follows 3-step commands    CRANIAL NERVES:      II: Pupils equal and reactive, no RAPD, no VF deficits, fundus(not done)      III, IV, VI: EOM intact, no gaze preference or deviation, no nystagmus.      V: normal sensation in V1, V2, and V3 segments bilaterally      VII: no asymmetry, no nasolabial fold flattening      VIII: normal hearing to speech      IX, X: normal palatal elevation, no uvular deviation      XI: 5/5 head turn and 5/5 shoulder shrug bilaterally      XII: midline tongue protrusion    MOTOR:  Normal tone throughout  5/5  muscle power in Rt shoulder abductors/adductors, elbow flexors/extensors, wrist flexors/extensors, finger abductors/adductors.  5/5 in Rt hip flexors/extensors, knee flexors/extensors, ankle dorsiflexors and plantar flexors.    5/5 muscle power in Lt shoulder abductors/adductors, elbow flexors/extensors, wrist flexors/extensors, finger abductors/adductors.  5/5 in Lt hip flexors/extensors, knee flexors/extensors, ankle dorsiflexors and plantea flexors.    REFLEXES:  no Harrison's, no clonus    SENSORY:    Normal to light touch all throughout     COORDINATION: Normal finger to nose and heel to shin, no tremor, no dysmetria    STATION: Not assessed due to patient condition    GAIT: Not assessed due to patient condition    Results Review:    I reviewed the patient's new clinical results.    Lab Results (last 24 hours)     Procedure Component Value Units Date/Time    Urine Culture - Urine, Urine, Clean Catch [[id number]]  (Abnormal)  (Susceptibility) Collected: 11/04/20 2154    Specimen: Urine, Clean Catch Updated: 11/06/20 0357     Urine Culture >100,000 CFU/mL Escherichia coli    Susceptibility      Escherichia coli     SHELIA     Ampicillin Susceptible     Ampicillin + Sulbactam Susceptible     Cefazolin Susceptible     Cefepime Susceptible     Ceftazidime Susceptible     Ceftriaxone Susceptible     Gentamicin Susceptible     Levofloxacin Susceptible     Nitrofurantoin Susceptible     Piperacillin + Tazobactam Susceptible     Tetracycline Susceptible     Trimethoprim + Sulfamethoxazole Susceptible                    C-reactive Protein [[id number]]  (Normal) Collected: 11/06/20 0057    Specimen: Blood Updated: 11/06/20 0159     C-Reactive Protein 0.08 mg/dL     Sedimentation Rate [[id number]]  (Normal) Collected: 11/06/20 0056    Specimen: Blood Updated: 11/06/20 0153     Sed Rate 13 mm/hr     CBC & Differential [[id number]]  (Abnormal) Collected: 11/06/20 0056    Specimen: Blood Updated: 11/06/20 0149    Narrative:      The  following orders were created for panel order CBC & Differential.  Procedure                               Abnormality         Status                     ---------                               -----------         ------                     CBC Auto Differential[[id number]]        Abnormal            Final result                 Please view results for these tests on the individual orders.    CBC Auto Differential [[id number]]  (Abnormal) Collected: 11/06/20 0056    Specimen: Blood Updated: 11/06/20 0149     WBC 2.92 10*3/mm3      RBC 3.58 10*6/mm3      Hemoglobin 9.2 g/dL      Hematocrit 31.2 %      MCV 87.2 fL      MCH 25.7 pg      MCHC 29.5 g/dL      RDW 19.9 %      RDW-SD 63.9 fl      MPV 12.6 fL      Platelets 138 10*3/mm3      Neutrophil % 58.6 %      Lymphocyte % 31.2 %      Monocyte % 7.5 %      Eosinophil % 1.0 %      Basophil % 1.4 %      Immature Grans % 0.3 %      Neutrophils, Absolute 1.71 10*3/mm3      Lymphocytes, Absolute 0.91 10*3/mm3      Monocytes, Absolute 0.22 10*3/mm3      Eosinophils, Absolute 0.03 10*3/mm3      Basophils, Absolute 0.04 10*3/mm3      Immature Grans, Absolute 0.01 10*3/mm3      nRBC 0.0 /100 WBC     Blood Culture - Blood, Arm, Right [[id number]] Collected: 11/04/20 2000    Specimen: Blood from Arm, Right Updated: 11/05/20 2030     Blood Culture No growth at 24 hours    Blood Culture - Blood, Arm, Left [[id number]] Collected: 11/04/20 1935    Specimen: Blood from Arm, Left Updated: 11/05/20 1945     Blood Culture No growth at 24 hours        Ct Chest Without Contrast    Result Date: 11/5/2020  1. No implant device seen or radiopaque foreign body within the esophagus. 2. Post surgical changes at the GE junction. 3. Dilated esophagus with gastroesophageal reflux noted. 4. Degenerative endplate Modic changes thoracic spine. Lytic lesion left aspect of T7 less determinant and should be further evaluated with bone scan. 5. Other nonacute findings as above.  This report was finalized on  11/5/2020 3:00 PM by Dr. Enrike Womack MD.      Mri Orbit Face Neck With & Without Contrast    Result Date: 11/5/2020  1. No orbital pathology identified. 2. Left frontal sinusitis, chronic in nature.  This report was finalized on 11/5/2020 6:17 PM by Dr. Enrike Womack MD.      Mri Brain With & Without Contrast    Result Date: 11/5/2020  1. No acute intracranial abnormality. 2. No pathologic contrast enhancement. 3. Advanced left frontal sinusitis. Otherwise unremarkable exam.  This report was finalized on 11/5/2020 6:10 PM by Dr. Enrike Womack MD.      Xr Chest 1 View    Result Date: 11/4/2020  No radiographic evidence of acute cardiac or pulmonary disease.  This report was finalized on 11/4/2020 8:44 PM by Dr. Enrike Womack MD.      Ct Angiogram Carotids    Result Date: 11/4/2020  1. No segments of significant stenosis or occlusion. 2. Other findings above.  This report was finalized on 11/4/2020 7:37 PM by Dr. Enrike Womack MD.      Ct Angiogram Head    Result Date: 11/4/2020  1. No large vessel occlusion. 2. Moderate short segment stenosis left supraclinoid ICA with characterization limited by extensive dense calcified plaque. 3. Otherwise unremarkable exam.    This report was finalized on 11/4/2020 7:40 PM by Dr. Enrkie Womack MD.      Ct Head Without Contrast Stroke Protocol    Result Date: 11/4/2020  No CT evidence of acute intracranial abnormality. Right mastoid effusion.  This report was finalized on 11/4/2020 7:19 PM by Dr. Enrike Womack MD.      Ct Cerebral Perfusion With & Without Contrast    Result Date: 11/4/2020  Normal CT perfusion.  This report was finalized on 11/4/2020 7:52 PM by Dr. Enrike Womack MD.      Results for orders placed during the hospital encounter of 11/26/18   Adult Transthoracic Echo Complete W/ Cont if Necessary Per Protocol    Narrative · Mild to moderate tricuspid valve regurgitation is present.  · Left ventricular systolic function is normal. Estimated EF = 65%.  · Left  atrial cavity size is borderline dilated.  · There is no evidence of pericardial effusion.  · Normal right ventricular cavity size, wall thickness, systolic function   and septal motion noted.  · Left ventricular diastolic function not assessed on this study.  · The aortic valve exhibits sclerosis.  · Mild pulmonary hypertension is present.  · There is no evidence of pericardial effusion.                Assessment/Plan     Assessment/Plan:  This is 70-year-old right-handed white female with history of hyperlipidemia, GERD, hypertension, coronary artery disease presents with left-sided symptoms.  Patient not a candidate for any acute stroke intervention therapies [out of the window for TPA,  No LVO]           Diagnostics- I personally reviewed all the imaging and labs  -CT head-no acute abnormality  -CT head and neck.-Significant intracranial atherosclerotic disease, left supraclinoid calcified atherosclerosis  -MRI brain and orbits- no acute infarct, no enchancement of the optic nerve   -LDL 29 , A1c 5.3  -TTE- will be scheduled as outpatient next week.         Left-sided weakness- She said heaviness of her left arm.which was complexed with chest pressure on presentation. MRI of brain shows no acute infarct. Could be a clinical stroke or TIA. CTA head showed diffuse intracranial atherosclerosis. Continue aspirin and Plavix for 21 days followed by aspirin monotherapy. Echo as outpatient.       Headache disorder, unspecified-patient has been having continous left sided headaches for the past 4 months. atypical for migrainous. No RAPID, ESR and CRP normal. 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  Not on any prophylaxis or abortive therapy.  We will initiate Depakote 250 mg BID. Magnesium 500, Riboflavin 400 mg daily. Follow up with Dr. Neil,  as outpatient in 2 weeks.     Hypertension-allow autoregulation, treat only if systolic blood pressure is greater than 200.  Type 2 diabetes-strict glycemic  control, goal less than Hyperlipidemia- Her LDL is 29, recommend to stop statin.   Chronic Anemia- low hb, may need outpatient work up               Arthur Rodrigues MD  11/06/20  08:07 EST    This was an audio and video enabled telemedicine encounter.

## 2020-11-06 NOTE — PLAN OF CARE
Goal Outcome Evaluation:   Pt resting in bed. No chest pain reported this shift. No s/s of distress. Will continue with plan of care. Will continue to monitor.

## 2020-11-06 NOTE — DISCHARGE INSTRUCTIONS
You are being discharged after being ruled out for stroke, however you did have significant amounts of plaque and plavix has been added to your aspirin to help with this.  In regards to your persistent headache we are making a referral to Dr. Neil in [place] who is a neurologist who focuses on headache that you will see in 2 weeks. You will be on 3 new medications for your headache, depakote, magnesium, and vitamin B2 (riboflavin).  The last 2 are over the counter supplements.  You will follow up with your PCP in one week and have a depakote level checked.

## 2020-11-06 NOTE — PROGRESS NOTES
Discharge Planning Assessment   [facility]     Patient Name: Lyla Vazquez  MRN: [id number]  Today's Date: 11/6/2020    Admit Date: 11/4/2020      Discharge Plan     Row Name 11/06/20 1408       Plan    Plan  Pt admitted on 11/4/20.  Pt lives at home with family and plans to return home at discharge.  Pt currently does not utilize home health or DME.  Pt continues to work full time.  Pt stated no needs. SS will follow.        GAUTAM MartinezW

## 2020-11-07 ENCOUNTER — READMISSION MANAGEMENT (OUTPATIENT)
Dept: CALL CENTER | Facility: HOSPITAL | Age: 70
End: 2020-11-07

## 2020-11-07 NOTE — OUTREACH NOTE
Prep Survey      Responses   [facility] patient discharged from?  [facility]   Is LACE score < 7 ?  No   Eligibility  Readm Mgmt   Discharge diagnosis  Left-sided weakness, headaches   Does the patient have one of the following disease processes/diagnoses(primary or secondary)?  Other   Does the patient have Home health ordered?  No   Is there a DME ordered?  No   Prep survey completed?  Yes          Daphne Busch RN

## 2020-11-08 NOTE — DISCHARGE SUMMARY
[facility] HOSPITALISTS DISCHARGE SUMMARY    Patient Identification:  Name:  Lyla Vazquez  Age:  70 y.o.  Sex:  female  :  1950  MRN:  [id number]  Visit Number:  [id number]    Date of Admission: 2020  Date of Discharge:  2020    PCP: Joyce Braden MD    DISCHARGE DIAGNOSIS    Left Sided Weakness, resolved  Headache Disorder  Non-obstructive CAD  Hyperlipidemia  CKD Stage II  Asymptomatic Bacteriuria .  GERD  CONSULTS       PROCEDURES PERFORMED      HOSPITAL COURSE  Patient is a 70 y.o. female presented to [facility] complaining of chest pain and facial swelling/drooping.  Please see the admitting history and physical for further details.      Patient was admitted and evaluated for stroke given the facial droop as well as some noted left sided weakness that quickly resolved. Patient underwent CT of the head with no acute abnormalities, CTA head and neck with significant intracranial atherosclerotic disease with left left supraclinoid calcified atherosclerosis.  MRI of the brain obtained showed no acute infarcts and no enhancemetn of the optic nerve as patient had complained of some blurry vision as well as headache.  In the course of her care is was discovered that her headache had been persistent and ongoing for nearly 4 months.  With the above imaging, CRP, ESR were also check and found to be normal lessening concern for vasculitis.  Neurology was concerned for possible hemicrania continua and arranged for patient to follow up with Dr. Neil as an outpatient in 2 weeks.  The patient was placed on Depakote 250mg bid, Magnesium, and Riboflavin for headache treatment. A Echocardiogram was not able to be obtained prior to discharge and patient will obtain this as an outpatient at follow up with her PCP as well as a depakote level in one week. Due to her advanced atherosclerosis she was placed on plavix in addition to her regular aspirin per Neurology recommendations.      VITAL SIGNS:        on   ;        Body mass index is 29.82 kg/m².  Wt Readings from Last 3 Encounters:   11/06/20 89 kg (196 lb 1.6 oz)   10/30/20 88.6 kg (195 lb 6.4 oz)   02/28/20 84.8 kg (187 lb)       PHYSICAL EXAM:  Constitutional:  Well-developed and well-nourished.  No acute distress.      HENT:  Head:  Normocephalic and atraumatic.  Mouth:  Moist mucous membranes.    Eyes:  Conjunctivae and EOM are normal. No scleral icterus.    Neck:  Neck supple.  No JVD present.    Cardiovascular:  Normal rate, regular rhythm and normal heart sounds with no murmur.  Pulmonary/Chest:  No respiratory distress, no wheezes, no crackles, with normal breath sounds and good air movement.  Abdominal:  Soft.  Bowel sounds are normal.  No distension and no tenderness.   Musculoskeletal:  No edema, no tenderness, and no deformity.  No red or swollen joints anywhere.  Functional ROM intact.   Neurological:  Alert and oriented to person, place, and time.  No cranial nerve deficit.  No tongue deviation.  No facial droop.  No slurred speech. Intact Sensation throughout  Skin:  Skin is warm and dry. No rash or lesion noted. No pallor.   Peripheral vascular:  Pulses in all 4 extremities with no clubbing, no cyanosis, no edema.  Psychiatric: Appropriate mood and affect, pleasant.     DISCHARGE DISPOSITION   Stable    DISCHARGE MEDICATIONS:     Discharge Medications      New Medications      Instructions Start Date   clopidogrel 75 MG tablet  Commonly known as: PLAVIX   75 mg, Oral, Daily      divalproex 250 MG DR tablet  Commonly known as: DEPAKOTE   250 mg, Oral, Every 12 Hours Scheduled      magnesium oxide 400 MG tablet  Commonly known as: MAG-OX   400 mg, Oral, Daily      Riboflavin 400 MG tablet   400 mg, Oral, Daily         Continue These Medications      Instructions Start Date   aspirin 81 MG EC tablet   81 mg, Oral, Daily      atorvastatin 20 MG tablet  Commonly known as: LIPITOR   20 mg, Oral, Daily      azelastine 0.1 %  nasal spray  Commonly known as: ASTELIN   2 sprays, Nasal, As Needed, Use in each nostril as directed      cetirizine 10 MG tablet  Commonly known as: zyrTEC   10 mg, Oral, Daily      cyclobenzaprine 10 MG tablet  Commonly known as: FLEXERIL   10 mg, Oral, Nightly      estrogens (conjugated) 0.625 MG tablet  Commonly known as: PREMARIN   1 tablet, Oral, Daily      gabapentin 600 MG tablet  Commonly known as: NEURONTIN   600 mg, Oral, 3 Times Daily PRN      HYDROcodone-acetaminophen  MG per tablet  Commonly known as: NORCO   1 tablet, Oral, 2 Times Daily PRN      lisinopril-hydrochlorothiazide 20-25 MG per tablet  Commonly known as: PRINZIDE,ZESTORETIC   1 tablet, Oral, Daily      pantoprazole 40 MG EC tablet  Commonly known as: PROTONIX   40 mg, Oral, Daily               Activity Instructions     Activity as Tolerated             Additional Instructions for the Follow-ups that You Need to Schedule     Discharge Follow-up with PCP   As directed       Currently Documented PCP:    Joyce Braden MD    PCP Phone Number:    [phone]     Follow Up Details: 1 week to for f/u and depakote level check         Discharge Follow-up with Specialty: Neurology - [facility]; 2 Weeks   As directed      Specialty: Neurology - [facility]    Follow Up: 2 Weeks    Follow Up Details: Dr. Nicola Neil           Follow-up Information     Joyce Braden MD .    Specialty: Family Medicine  Why: 1 week to for f/u and depakote level check  Contact information:  [address] [place], [place] [id number] [phone]             Joyce Braden MD .    Specialty: Family Medicine  Contact information:  [address] [place] [address]  [phone]                    TEST  RESULTS PENDING AT DISCHARGE  Pending Labs     Order Current Status    Blood Culture - Blood, Arm, Left Preliminary result    Blood Culture - Blood, Arm, Right Preliminary result           CODE STATUS  Code Status and Medical Interventions:    Ordered at: 11/04/20 2106     Level Of Support Discussed With:    Patient     Code Status:    CPR     Medical Interventions (Level of Support Prior to Arrest):    Full       Garry Brennan DO  [facility] Hospitalist  11/07/20  21:50 EST    Please note that this discharge summary required more than 30 minutes to complete.

## 2020-11-09 LAB
BACTERIA SPEC AEROBE CULT: NORMAL
BACTERIA SPEC AEROBE CULT: NORMAL

## 2020-11-10 ENCOUNTER — READMISSION MANAGEMENT (OUTPATIENT)
Dept: CALL CENTER | Facility: HOSPITAL | Age: 70
End: 2020-11-10

## 2020-11-10 NOTE — OUTREACH NOTE
Medical Week 1 Survey      Responses   [facility] patient discharged from?  [facility]   Does the patient have one of the following disease processes/diagnoses(primary or secondary)?  Other   Week 1 attempt successful?  Yes   Call start time  1216   Call end time  1220   Discharge diagnosis  Left-sided weakness, headaches   Is patient permission given to speak with other caregiver?  Yes   Meds reviewed with patient/caregiver?  Yes   Is the patient having any side effects they believe may be caused by any medication additions or changes?  No   Does the patient have all medications ordered at discharge?  Yes   Is the patient taking all medications as directed (includes completed medication regime)?  Yes   Comments regarding appointments  PCP on 11/13/2020.    Does the patient have a primary care provider?   Yes   Does the patient have an appointment with their PCP within 7 days of discharge?  Yes   Has the patient kept scheduled appointments due by today?  N/A   Has home health visited the patient within 72 hours of discharge?  N/A   Psychosocial issues?  No   Did the patient receive a copy of their discharge instructions?  Yes   Nursing interventions  Reviewed instructions with patient   What is the patient's perception of their health status since discharge?  Improving [She says she is very tired and getting her strength back. ]   Is the patient/caregiver able to teach back signs and symptoms related to disease process for when to call PCP?  Yes   Is the patient/caregiver able to teach back signs and symptoms related to disease process for when to call 911?  Yes   Is the patient/caregiver able to teach back the hierarchy of who to call/visit for symptoms/problems? PCP, Specialist, Home health nurse, Urgent Care, ED, 911  Yes   If the patient is a current smoker, are they able to teach back resources for cessation?  Not a smoker   Week 1 call completed?  Yes          Joanne Coppola RN

## 2020-11-17 ENCOUNTER — READMISSION MANAGEMENT (OUTPATIENT)
Dept: CALL CENTER | Facility: HOSPITAL | Age: 70
End: 2020-11-17

## 2020-11-17 NOTE — OUTREACH NOTE
Medical Week 2 Survey      Responses   [facility] patient discharged from?  [facility]   Does the patient have one of the following disease processes/diagnoses(primary or secondary)?  Other   Week 2 attempt successful?  No   Unsuccessful attempts  Attempt 1          Yahaira [facility] RN

## 2020-11-18 ENCOUNTER — READMISSION MANAGEMENT (OUTPATIENT)
Dept: CALL CENTER | Facility: HOSPITAL | Age: 70
End: 2020-11-18

## 2020-11-18 NOTE — OUTREACH NOTE
Medical Week 2 Survey      Responses   [facility] patient discharged from?  [facility]   Does the patient have one of the following disease processes/diagnoses(primary or secondary)?  Other   Week 2 attempt successful?  Yes   Call start time  1614   Discharge diagnosis  Left-sided weakness, headaches   Call end time  1618   Meds reviewed with patient/caregiver?  Yes   Is the patient having any side effects they believe may be caused by any medication additions or changes?  No   Does the patient have all medications ordered at discharge?  Yes   Is the patient taking all medications as directed (includes completed medication regime)?  Yes   Does the patient have a primary care provider?   Yes   Does the patient have an appointment with their PCP within 7 days of discharge?  Yes   Has the patient kept scheduled appointments due by today?  Yes   Comments  States she has followed up with Dr. Braden and he is waiting on hospital results.     Has home health visited the patient within 72 hours of discharge?  N/A   Psychosocial issues?  No   Did the patient receive a copy of their discharge instructions?  Yes   Nursing interventions  Reviewed instructions with patient   What is the patient's perception of their health status since discharge?  Same   Is the patient/caregiver able to teach back signs and symptoms related to disease process for when to call PCP?  Yes   Is the patient/caregiver able to teach back signs and symptoms related to disease process for when to call 911?  Yes   Is the patient/caregiver able to teach back the hierarchy of who to call/visit for symptoms/problems? PCP, Specialist, Home health nurse, Urgent Care, ED, 911  Yes   If the patient is a current smoker, are they able to teach back resources for cessation?  Not a smoker   Additional teach back comments  States she is still having the headaches and now leg swelling.  She has followed up with Dr. Braden and is going to call him tomorrow.  States he  told her he was waiting on results from her hosptial stay.     Week 2 Call Completed?  Yes   Wrap up additional comments  Pt to contact her PCP tomorrow regarding cont headache and leg swelling.          Saima Adorno LPN

## 2020-11-24 ENCOUNTER — READMISSION MANAGEMENT (OUTPATIENT)
Dept: CALL CENTER | Facility: HOSPITAL | Age: 70
End: 2020-11-24

## 2020-11-24 NOTE — OUTREACH NOTE
Medical Week 3 Survey      Responses   [facility] patient discharged from?  [facility]   Does the patient have one of the following disease processes/diagnoses(primary or secondary)?  Other   Week 3 attempt successful?  No   Unsuccessful attempts  Attempt 1          Maeve Montanez RN

## 2020-11-25 ENCOUNTER — READMISSION MANAGEMENT (OUTPATIENT)
Dept: CALL CENTER | Facility: HOSPITAL | Age: 70
End: 2020-11-25

## 2021-01-07 ENCOUNTER — PREP FOR SURGERY (OUTPATIENT)
Dept: OTHER | Facility: HOSPITAL | Age: 71
End: 2021-01-07

## 2021-01-07 ENCOUNTER — OFFICE VISIT (OUTPATIENT)
Dept: SURGERY | Facility: CLINIC | Age: 71
End: 2021-01-07

## 2021-01-07 ENCOUNTER — TELEPHONE (OUTPATIENT)
Dept: SURGERY | Facility: CLINIC | Age: 71
End: 2021-01-07

## 2021-01-07 VITALS
OXYGEN SATURATION: 99 % | WEIGHT: 206 LBS | TEMPERATURE: 97.3 F | HEIGHT: 68 IN | SYSTOLIC BLOOD PRESSURE: 112 MMHG | DIASTOLIC BLOOD PRESSURE: 67 MMHG | RESPIRATION RATE: 17 BRPM | BODY MASS INDEX: 31.22 KG/M2 | HEART RATE: 79 BPM

## 2021-01-07 DIAGNOSIS — C43.59 MALIGNANT MELANOMA OF TORSO EXCLUDING BREAST (HCC): Primary | ICD-10-CM

## 2021-01-07 DIAGNOSIS — C43.62 MALIGNANT MELANOMA OF LEFT UPPER EXTREMITY INCLUDING SHOULDER (HCC): Primary | ICD-10-CM

## 2021-01-07 PROCEDURE — 99213 OFFICE O/P EST LOW 20 MIN: CPT | Performed by: SURGERY

## 2021-01-07 NOTE — TELEPHONE ENCOUNTER
I SPOKE WITH PATIENT. SHE IS AWARE TO GO FOR PAT ON JAN 11 AND COVID TEST ALSO THAT DAY. SHE IS AWARE THAT SURGERY IS JAN 13 @ 8AM.

## 2021-01-07 NOTE — PROGRESS NOTES
1/7/2021    Patient Information  Lyla Vazquez  [address] [facility] [place] [address]  1950  [phone] (home) [phone] (work)      Chief Complaint   Patient presents with   • Skin Lesion     Melanoma R Shoulder       HPI  Patient 70-year-old white female who had a lesion removed from her left shoulder by Dr. Braden approximately a week ago.  She was seen here because pathology showed this to be a melanoma.  Pathology report shows if of invasion 1.75 mm  Past Medical History:   Diagnosis Date   • Arthritis    • Chronic bronchitis (CMS/HCC)    • Coronary artery disease    • Diastolic CHF, chronic (CMS/HCC) 11/5/2020   • GERD (gastroesophageal reflux disease)    • History of CVA (cerebrovascular accident)     right sided; 2005   • History of gastric ulcer    • Hyperlipidemia    • Hypertension    • Melanoma of skin (CMS/HCC)    • Myocardial infarction (CMS/HCC)    • Normocytic anemia        Past Surgical History:   Procedure Laterality Date   • ABDOMINAL SURGERY     • BRAVO PROCEDURE N/A 9/16/2019    Procedure: ESOPHAGOGASTRODUODENOSCOPY AND FRIEDMAN;  Surgeon: Neil Balderrama MD;  Location: [facility] OR;  Service: Gastroenterology   • CARDIAC CATHETERIZATION     • CARDIAC CATHETERIZATION N/A 12/17/2018    Procedure: Left Heart Cath;  Surgeon: Sandip Zamora IV, MD;  Location: [facility] CATH INVASIVE LOCATION;  Service: Cardiovascular   • CHOLECYSTECTOMY      laparoscopic   • COLONOSCOPY  2014   • ENDOSCOPY     • ENDOSCOPY N/A 11/11/2019    Procedure: ESOPHAGOGASTRODUODENOSCOPY WITH  DILATATION WITH FLUOROSCOPY;  Surgeon: Neil Balderrama MD;  Location: [facility] OR;  Service: Gastroenterology   • FRACTURE SURGERY     • HYSTERECTOMY      benign   • OTHER SURGICAL HISTORY      leg repair   • STOMACH SURGERY         Family History   Problem Relation Age of Onset   • Diabetes Other    • Hypertension Other    • Ovarian cancer Other    • Cancer Mother         bladder   • Cancer Sister         liver   • Diabetes Sister     • Diabetes Brother    • No Known Problems Father    • Breast cancer Neg Hx        Social History     Socioeconomic History   • Marital status:      Spouse name: Not on file   • Number of children: Not on file   • Years of education: Not on file   • Highest education level: Not on file   Tobacco Use   • Smoking status: Never Smoker   • Smokeless tobacco: Never Used   Substance and Sexual Activity   • Alcohol use: No   • Drug use: No   • Sexual activity: Defer       Current Medications  Current Outpatient Medications   Medication Sig Dispense Refill   • aspirin (aspirin) 81 MG EC tablet Take 1 tablet by mouth Daily. 90 tablet 3   • atorvastatin (LIPITOR) 20 MG tablet Take 20 mg by mouth Daily.     • azelastine (ASTELIN) 0.1 % nasal spray 2 sprays into the nostril(s) as directed by provider As Needed for Rhinitis or Allergies. Use in each nostril as directed      • cetirizine (zyrTEC) 10 MG tablet Take 10 mg by mouth Daily.     • clopidogrel (PLAVIX) 75 MG tablet Take 1 tablet by mouth Daily. 30 tablet 1   • cyclobenzaprine (FLEXERIL) 10 MG tablet Take 10 mg by mouth Every Night.     • divalproex (DEPAKOTE) 250 MG DR tablet Take 1 tablet by mouth Every 12 (Twelve) Hours for 30 days. 60 tablet 0   • estrogens, conjugated, (PREMARIN) 0.625 MG tablet Take 1 tablet by mouth daily.     • gabapentin (NEURONTIN) 600 MG tablet Take 600 mg by mouth 3 (Three) Times a Day As Needed (pain).     • HYDROcodone-acetaminophen (NORCO)  MG per tablet Take 1 tablet by mouth 2 (Two) Times a Day As Needed for Moderate Pain .     • lisinopril-hydrochlorothiazide (PRINZIDE,ZESTORETIC) 20-25 MG per tablet Take 1 tablet by mouth Daily. 90 tablet 3   • magnesium oxide (MAG-OX) 400 MG tablet Take 1 tablet by mouth Daily. 30 tablet 0   • pantoprazole (PROTONIX) 40 MG EC tablet Take 40 mg by mouth Daily.     • Riboflavin 400 MG tablet Take 400 mg by mouth Daily. 30 tablet 0     No current facility-administered medications for this  "visit.        Allergies  Sulfa antibiotics              Vitals:    01/07/21 0926   BP: 112/67   Pulse: 79   Resp: 17   Temp: 97.3 °F (36.3 °C)   SpO2: 99%     Body mass index is 31.32 kg/m².      01/07/21  0926   Weight: 93.4 kg (206 lb)     172.7 cm (68\")      Physical Exam  HENT:      Head: Normocephalic and atraumatic.      Right Ear: External ear normal.      Left Ear: External ear normal.      Nose: Nose normal.      Mouth/Throat:      Mouth: Mucous membranes are moist.   Eyes:      Extraocular Movements: Extraocular movements intact.      Conjunctiva/sclera: Conjunctivae normal.      Pupils: Pupils are equal, round, and reactive to light.   Neck:      Musculoskeletal: Neck supple.   Cardiovascular:      Rate and Rhythm: Normal rate and regular rhythm.   Pulmonary:      Effort: Pulmonary effort is normal.      Breath sounds: Normal breath sounds.   Abdominal:      Palpations: Abdomen is soft.   Musculoskeletal: Normal range of motion.   Skin:     General: Skin is warm and dry.   Neurological:      General: No focal deficit present.      Mental Status: She is alert and oriented to person, place, and time.   Psychiatric:         Mood and Affect: Mood normal.         Behavior: Behavior normal.       General a 70 white female no distress    Left shoulder there is a 0.75 cm scar.          Assessment   Melanoma left shoulder 1.75 mm in depth        Plan  Wide excision melanoma left shoulder and sentinel node biopsy              This document signed by Neil Balderrama MD January 7, 2021 09:50 EST       "

## 2021-01-08 ENCOUNTER — APPOINTMENT (OUTPATIENT)
Dept: PREADMISSION TESTING | Facility: HOSPITAL | Age: 71
End: 2021-01-08

## 2021-01-08 NOTE — DISCHARGE INSTRUCTIONS

## 2021-01-11 ENCOUNTER — APPOINTMENT (OUTPATIENT)
Dept: PREADMISSION TESTING | Facility: HOSPITAL | Age: 71
End: 2021-01-11

## 2021-01-11 ENCOUNTER — LAB (OUTPATIENT)
Dept: LAB | Facility: HOSPITAL | Age: 71
End: 2021-01-11

## 2021-01-11 DIAGNOSIS — Z01.818 PRE-OPERATIVE CLEARANCE: Primary | ICD-10-CM

## 2021-01-11 DIAGNOSIS — Z01.818 PRE-OPERATIVE CLEARANCE: ICD-10-CM

## 2021-01-11 LAB
ANION GAP SERPL CALCULATED.3IONS-SCNC: 7.1 MMOL/L (ref 5–15)
BUN SERPL-MCNC: 18 MG/DL (ref 8–23)
BUN/CREAT SERPL: 18.8 (ref 7–25)
CALCIUM SPEC-SCNC: 9.4 MG/DL (ref 8.6–10.5)
CHLORIDE SERPL-SCNC: 104 MMOL/L (ref 98–107)
CO2 SERPL-SCNC: 28.9 MMOL/L (ref 22–29)
CREAT SERPL-MCNC: 0.96 MG/DL (ref 0.57–1)
DEPRECATED RDW RBC AUTO: 53.3 FL (ref 37–54)
ERYTHROCYTE [DISTWIDTH] IN BLOOD BY AUTOMATED COUNT: 16.9 % (ref 12.3–15.4)
GFR SERPL CREATININE-BSD FRML MDRD: 58 ML/MIN/1.73
GLUCOSE SERPL-MCNC: 81 MG/DL (ref 65–99)
HCT VFR BLD AUTO: 32 % (ref 34–46.6)
HGB BLD-MCNC: 9.1 G/DL (ref 12–15.9)
MCH RBC QN AUTO: 24.7 PG (ref 26.6–33)
MCHC RBC AUTO-ENTMCNC: 28.4 G/DL (ref 31.5–35.7)
MCV RBC AUTO: 87 FL (ref 79–97)
PLATELET # BLD AUTO: 160 10*3/MM3 (ref 140–450)
PMV BLD AUTO: 12.8 FL (ref 6–12)
POTASSIUM SERPL-SCNC: 4.1 MMOL/L (ref 3.5–5.2)
RBC # BLD AUTO: 3.68 10*6/MM3 (ref 3.77–5.28)
SODIUM SERPL-SCNC: 140 MMOL/L (ref 136–145)
WBC # BLD AUTO: 3.14 10*3/MM3 (ref 3.4–10.8)

## 2021-01-11 PROCEDURE — 36415 COLL VENOUS BLD VENIPUNCTURE: CPT

## 2021-01-11 PROCEDURE — 80048 BASIC METABOLIC PNL TOTAL CA: CPT

## 2021-01-11 PROCEDURE — 85027 COMPLETE CBC AUTOMATED: CPT

## 2021-01-11 PROCEDURE — U0004 COV-19 TEST NON-CDC HGH THRU: HCPCS | Performed by: SURGERY

## 2021-01-11 PROCEDURE — C9803 HOPD COVID-19 SPEC COLLECT: HCPCS

## 2021-01-12 ENCOUNTER — TELEPHONE (OUTPATIENT)
Dept: SURGERY | Facility: CLINIC | Age: 71
End: 2021-01-12

## 2021-01-12 LAB — SARS-COV-2 RNA RESP QL NAA+PROBE: NOT DETECTED

## 2021-01-13 ENCOUNTER — ANESTHESIA EVENT (OUTPATIENT)
Dept: PERIOP | Facility: HOSPITAL | Age: 71
End: 2021-01-13

## 2021-01-13 ENCOUNTER — HOSPITAL ENCOUNTER (OUTPATIENT)
Dept: NUCLEAR MEDICINE | Facility: HOSPITAL | Age: 71
Discharge: HOME | End: 2021-01-13

## 2021-01-13 ENCOUNTER — ANESTHESIA (OUTPATIENT)
Dept: PERIOP | Facility: HOSPITAL | Age: 71
End: 2021-01-13

## 2021-01-13 ENCOUNTER — HOSPITAL ENCOUNTER (OUTPATIENT)
Facility: HOSPITAL | Age: 71
Setting detail: HOSPITAL OUTPATIENT SURGERY
Discharge: HOME | End: 2021-01-13
Attending: SURGERY | Admitting: SURGERY

## 2021-01-13 VITALS
BODY MASS INDEX: 30.77 KG/M2 | HEART RATE: 62 BPM | RESPIRATION RATE: 18 BRPM | DIASTOLIC BLOOD PRESSURE: 76 MMHG | HEIGHT: 68 IN | OXYGEN SATURATION: 99 % | SYSTOLIC BLOOD PRESSURE: 132 MMHG | TEMPERATURE: 97.8 F | WEIGHT: 203 LBS

## 2021-01-13 DIAGNOSIS — C43.59 MALIGNANT MELANOMA OF TORSO EXCLUDING BREAST (HCC): ICD-10-CM

## 2021-01-13 DIAGNOSIS — C43.62 MALIGNANT MELANOMA OF LEFT UPPER EXTREMITY INCLUDING SHOULDER (HCC): ICD-10-CM

## 2021-01-13 DIAGNOSIS — I25.119 CORONARY ARTERY DISEASE INVOLVING NATIVE CORONARY ARTERY OF NATIVE HEART WITH ANGINA PECTORIS (HCC): ICD-10-CM

## 2021-01-13 DIAGNOSIS — I50.32 DIASTOLIC CHF, CHRONIC (HCC): ICD-10-CM

## 2021-01-13 PROCEDURE — 12032 INTMD RPR S/A/T/EXT 2.6-7.5: CPT | Performed by: SURGERY

## 2021-01-13 PROCEDURE — 0 TECHNETIUM FILTERED SULFUR COLLOID: Performed by: SURGERY

## 2021-01-13 PROCEDURE — 38792 RA TRACER ID OF SENTINL NODE: CPT

## 2021-01-13 PROCEDURE — 11606 EXC TR-EXT MAL+MARG >4 CM: CPT | Performed by: SURGERY

## 2021-01-13 PROCEDURE — 25010000002 ONDANSETRON PER 1 MG: Performed by: NURSE ANESTHETIST, CERTIFIED REGISTERED

## 2021-01-13 PROCEDURE — 25010000002 FENTANYL CITRATE (PF) 100 MCG/2ML SOLUTION: Performed by: NURSE ANESTHETIST, CERTIFIED REGISTERED

## 2021-01-13 PROCEDURE — A9541 TC99M SULFUR COLLOID: HCPCS | Performed by: SURGERY

## 2021-01-13 PROCEDURE — 25010000002 PROPOFOL 10 MG/ML EMULSION: Performed by: NURSE ANESTHETIST, CERTIFIED REGISTERED

## 2021-01-13 RX ORDER — ONDANSETRON 2 MG/ML
INJECTION INTRAMUSCULAR; INTRAVENOUS AS NEEDED
Status: DISCONTINUED | OUTPATIENT
Start: 2021-01-13 | End: 2021-01-13 | Stop reason: SURG

## 2021-01-13 RX ORDER — SODIUM CHLORIDE 0.9 % (FLUSH) 0.9 %
10 SYRINGE (ML) INJECTION AS NEEDED
Status: DISCONTINUED | OUTPATIENT
Start: 2021-01-13 | End: 2021-01-13 | Stop reason: HOSPADM

## 2021-01-13 RX ORDER — SODIUM CHLORIDE, SODIUM LACTATE, POTASSIUM CHLORIDE, CALCIUM CHLORIDE 600; 310; 30; 20 MG/100ML; MG/100ML; MG/100ML; MG/100ML
INJECTION, SOLUTION INTRAVENOUS CONTINUOUS PRN
Status: DISCONTINUED | OUTPATIENT
Start: 2021-01-13 | End: 2021-01-13 | Stop reason: SURG

## 2021-01-13 RX ORDER — KETOROLAC TROMETHAMINE 30 MG/ML
15 INJECTION, SOLUTION INTRAMUSCULAR; INTRAVENOUS EVERY 6 HOURS PRN
Status: DISCONTINUED | OUTPATIENT
Start: 2021-01-13 | End: 2021-01-13 | Stop reason: HOSPADM

## 2021-01-13 RX ORDER — ONDANSETRON 2 MG/ML
4 INJECTION INTRAMUSCULAR; INTRAVENOUS AS NEEDED
Status: DISCONTINUED | OUTPATIENT
Start: 2021-01-13 | End: 2021-01-13 | Stop reason: HOSPADM

## 2021-01-13 RX ORDER — IPRATROPIUM BROMIDE AND ALBUTEROL SULFATE 2.5; .5 MG/3ML; MG/3ML
3 SOLUTION RESPIRATORY (INHALATION) ONCE AS NEEDED
Status: DISCONTINUED | OUTPATIENT
Start: 2021-01-13 | End: 2021-01-13 | Stop reason: HOSPADM

## 2021-01-13 RX ORDER — MIDAZOLAM HYDROCHLORIDE 1 MG/ML
1 INJECTION INTRAMUSCULAR; INTRAVENOUS
Status: DISCONTINUED | OUTPATIENT
Start: 2021-01-13 | End: 2021-01-13 | Stop reason: HOSPADM

## 2021-01-13 RX ORDER — FENTANYL CITRATE 50 UG/ML
50 INJECTION, SOLUTION INTRAMUSCULAR; INTRAVENOUS
Status: DISCONTINUED | OUTPATIENT
Start: 2021-01-13 | End: 2021-01-13 | Stop reason: HOSPADM

## 2021-01-13 RX ORDER — FAMOTIDINE 10 MG/ML
INJECTION, SOLUTION INTRAVENOUS AS NEEDED
Status: DISCONTINUED | OUTPATIENT
Start: 2021-01-13 | End: 2021-01-13 | Stop reason: SURG

## 2021-01-13 RX ORDER — DROPERIDOL 2.5 MG/ML
0.62 INJECTION, SOLUTION INTRAMUSCULAR; INTRAVENOUS ONCE AS NEEDED
Status: DISCONTINUED | OUTPATIENT
Start: 2021-01-13 | End: 2021-01-13 | Stop reason: HOSPADM

## 2021-01-13 RX ORDER — OXYCODONE HYDROCHLORIDE AND ACETAMINOPHEN 5; 325 MG/1; MG/1
1 TABLET ORAL EVERY 4 HOURS PRN
Qty: 10 TABLET | Refills: 0 | OUTPATIENT
Start: 2021-01-13 | End: 2022-05-11

## 2021-01-13 RX ORDER — OXYCODONE HYDROCHLORIDE AND ACETAMINOPHEN 5; 325 MG/1; MG/1
1 TABLET ORAL ONCE AS NEEDED
Status: COMPLETED | OUTPATIENT
Start: 2021-01-13 | End: 2021-01-13

## 2021-01-13 RX ORDER — PROPOFOL 10 MG/ML
VIAL (ML) INTRAVENOUS AS NEEDED
Status: DISCONTINUED | OUTPATIENT
Start: 2021-01-13 | End: 2021-01-13 | Stop reason: SURG

## 2021-01-13 RX ORDER — SODIUM CHLORIDE, SODIUM LACTATE, POTASSIUM CHLORIDE, CALCIUM CHLORIDE 600; 310; 30; 20 MG/100ML; MG/100ML; MG/100ML; MG/100ML
125 INJECTION, SOLUTION INTRAVENOUS ONCE
Status: COMPLETED | OUTPATIENT
Start: 2021-01-13 | End: 2021-01-13

## 2021-01-13 RX ORDER — BUPIVACAINE HYDROCHLORIDE AND EPINEPHRINE 2.5; 5 MG/ML; UG/ML
INJECTION, SOLUTION EPIDURAL; INFILTRATION; INTRACAUDAL; PERINEURAL AS NEEDED
Status: DISCONTINUED | OUTPATIENT
Start: 2021-01-13 | End: 2021-01-13 | Stop reason: HOSPADM

## 2021-01-13 RX ORDER — SODIUM CHLORIDE 0.9 % (FLUSH) 0.9 %
10 SYRINGE (ML) INJECTION EVERY 12 HOURS SCHEDULED
Status: DISCONTINUED | OUTPATIENT
Start: 2021-01-13 | End: 2021-01-13 | Stop reason: HOSPADM

## 2021-01-13 RX ORDER — LIDOCAINE HYDROCHLORIDE 20 MG/ML
INJECTION, SOLUTION INFILTRATION; PERINEURAL AS NEEDED
Status: DISCONTINUED | OUTPATIENT
Start: 2021-01-13 | End: 2021-01-13 | Stop reason: SURG

## 2021-01-13 RX ORDER — SODIUM CHLORIDE, SODIUM LACTATE, POTASSIUM CHLORIDE, CALCIUM CHLORIDE 600; 310; 30; 20 MG/100ML; MG/100ML; MG/100ML; MG/100ML
100 INJECTION, SOLUTION INTRAVENOUS ONCE AS NEEDED
Status: DISCONTINUED | OUTPATIENT
Start: 2021-01-13 | End: 2021-01-13 | Stop reason: HOSPADM

## 2021-01-13 RX ORDER — MAGNESIUM HYDROXIDE 1200 MG/15ML
LIQUID ORAL AS NEEDED
Status: DISCONTINUED | OUTPATIENT
Start: 2021-01-13 | End: 2021-01-13 | Stop reason: HOSPADM

## 2021-01-13 RX ADMIN — FENTANYL CITRATE 50 MCG: 50 INJECTION INTRAMUSCULAR; INTRAVENOUS at 15:04

## 2021-01-13 RX ADMIN — PROPOFOL 100 MG: 10 INJECTION, EMULSION INTRAVENOUS at 14:06

## 2021-01-13 RX ADMIN — FENTANYL CITRATE 50 MCG: 50 INJECTION INTRAMUSCULAR; INTRAVENOUS at 15:10

## 2021-01-13 RX ADMIN — TECHNETIUM TC 99M SULFUR COLLOID 1 DOSE: KIT at 08:37

## 2021-01-13 RX ADMIN — OXYCODONE HYDROCHLORIDE AND ACETAMINOPHEN 1 TABLET: 5; 325 TABLET ORAL at 15:26

## 2021-01-13 RX ADMIN — TECHNETIUM TC 99M SULFUR COLLOID 1 DOSE: KIT at 08:38

## 2021-01-13 RX ADMIN — TECHNETIUM TC 99M SULFUR COLLOID 1 DOSE: KIT at 08:39

## 2021-01-13 RX ADMIN — SODIUM CHLORIDE, POTASSIUM CHLORIDE, SODIUM LACTATE AND CALCIUM CHLORIDE 125 ML/HR: 600; 310; 30; 20 INJECTION, SOLUTION INTRAVENOUS at 10:27

## 2021-01-13 RX ADMIN — ONDANSETRON 4 MG: 2 INJECTION INTRAMUSCULAR; INTRAVENOUS at 14:06

## 2021-01-13 RX ADMIN — ONDANSETRON 4 MG: 2 INJECTION INTRAMUSCULAR; INTRAVENOUS at 15:07

## 2021-01-13 RX ADMIN — FAMOTIDINE 20 MG: 10 INJECTION INTRAVENOUS at 14:06

## 2021-01-13 RX ADMIN — LIDOCAINE HYDROCHLORIDE 60 MG: 20 INJECTION, SOLUTION INFILTRATION; PERINEURAL at 14:06

## 2021-01-13 RX ADMIN — SODIUM CHLORIDE, POTASSIUM CHLORIDE, SODIUM LACTATE AND CALCIUM CHLORIDE: 600; 310; 30; 20 INJECTION, SOLUTION INTRAVENOUS at 14:02

## 2021-01-13 RX ADMIN — TECHNETIUM TC 99M SULFUR COLLOID 1 DOSE: KIT at 08:36

## 2021-01-13 NOTE — ANESTHESIA PREPROCEDURE EVALUATION
Anesthesia Evaluation     Patient summary reviewed and Nursing notes reviewed   no history of anesthetic complications:  NPO Solid Status: > 8 hours  NPO Liquid Status: > 8 hours           Airway   Mallampati: II  TM distance: >3 FB  Neck ROM: full  no difficulty expected  Dental - normal exam     Pulmonary - negative pulmonary ROS and normal exam   (-) asthma, not a smoker  Cardiovascular - normal exam  Exercise tolerance: good (4-7 METS)    NYHA Classification: II  Patient on routine beta blocker and Beta blocker given within 24 hours of surgery    (+) hypertension, past MI  6-12 months, CAD, dysrhythmias Bradycardia, angina, CHF , hyperlipidemia,       Neuro/Psych  (+) TIA, numbness,     (-) CVA  GI/Hepatic/Renal/Endo    (+)  GERD, PUD,    (-) liver disease, no renal disease, diabetes    Musculoskeletal     (+) back pain,   Abdominal  - normal exam    Bowel sounds: normal.   Substance History - negative use     OB/GYN negative ob/gyn ROS         Other   arthritis,    history of cancer active            Anesthesia Evaluation     Patient summary reviewed and Nursing notes reviewed   no history of anesthetic complications:  NPO Solid Status: > 8 hours  NPO Liquid Status: > 8 hours           Airway   Mallampati: II  TM distance: >3 FB  Neck ROM: full  no difficulty expected  Dental - normal exam     Pulmonary - negative pulmonary ROS and normal exam   (-) asthma, not a smoker  Cardiovascular - normal exam  Exercise tolerance: good (4-7 METS)    NYHA Classification: II  Patient on routine beta blocker and Beta blocker given within 24 hours of surgery    (+) hypertension, past MI  6-12 months, CAD, dysrhythmias Bradycardia, angina, hyperlipidemia,       Neuro/Psych  (+) TIA, numbness,     (-) CVA  GI/Hepatic/Renal/Endo    (+)  GERD, PUD,    (-) liver disease, no renal disease, diabetes    Musculoskeletal     (+) back pain,   Abdominal  - normal exam    Bowel sounds: normal.   Substance History - negative use      OB/GYN negative ob/gyn ROS         Other   arthritis,                        Anesthesia Plan    ASA 3     general     intravenous induction     Anesthetic plan, all risks, benefits, and alternatives have been provided, discussed and informed consent has been obtained with: patient.  Use of blood products discussed with patient  Consented to blood products.              Anesthesia Plan    ASA 3     general     intravenous induction     Anesthetic plan, all risks, benefits, and alternatives have been provided, discussed and informed consent has been obtained with: patient.  Use of blood products discussed with patient  Consented to blood products.

## 2021-01-13 NOTE — ANESTHESIA PROCEDURE NOTES
Airway  Urgency: elective    Date/Time: 1/13/2021 2:06 PM  Airway not difficult    General Information and Staff    Patient location during procedure: OR  Anesthesiologist: Chuckie Wyatt MD  CRNA: Amanda Watson CRNA    Indications and Patient Condition  Indications for airway management: airway protection    Preoxygenated: yes  Mask difficulty assessment: 0 - not attempted    Final Airway Details  Final airway type: supraglottic airway      Successful airway: classic  Size 3    Number of attempts at approach: 1  Assessment: lips, teeth, and gum same as pre-op and atraumatic intubation    Additional Comments  LMA placed with no trauma noted. Patient tolerated well. Good seal. Secured.

## 2021-01-13 NOTE — ADDENDUM NOTE
Addendum  created 01/13/21 1503 by Yefri Garg MD    Actions taken from a BestPractice Advisory, Visit diagnoses modified

## 2021-01-13 NOTE — ANESTHESIA POSTPROCEDURE EVALUATION
Patient: Lyla Vazquez    Procedure Summary     Date: 01/13/21 Room / Location:  [facility] OR 01 /  [facility] OR    Anesthesia Start: 1402 Anesthesia Stop: 1439    Procedure: Skin lesion excision left shoulder (Left Abdomen) Diagnosis:       Malignant melanoma of torso excluding breast (CMS/HCC)      (Malignant melanoma of torso excluding breast (CMS/HCC) [C43.59])    Surgeon: Neil Balderrama MD Provider: Chuckie Wyatt MD    Anesthesia Type: general ASA Status: 3          Anesthesia Type: general    Vitals  Vitals Value Taken Time   /77 01/13/21 1455   Temp 97.2 °F (36.2 °C) 01/13/21 1440   Pulse 69 01/13/21 1455   Resp 9 01/13/21 1455   SpO2 100 % 01/13/21 1455           Post Anesthesia Care and Evaluation    Patient location during evaluation: PACU  Patient participation: complete - patient participated  Level of consciousness: awake  Pain score: 1  Pain management: adequate  Airway patency: patent  Anesthetic complications: No anesthetic complications  PONV Status: none  Cardiovascular status: acceptable  Respiratory status: acceptable  Hydration status: acceptable

## 2021-01-18 LAB
LAB AP CASE REPORT: NORMAL
PATH REPORT.FINAL DX SPEC: NORMAL

## 2021-01-20 ENCOUNTER — APPOINTMENT (OUTPATIENT)
Dept: GENERAL RADIOLOGY | Facility: HOSPITAL | Age: 71
End: 2021-01-20

## 2021-01-20 ENCOUNTER — HOSPITAL ENCOUNTER (EMERGENCY)
Facility: HOSPITAL | Age: 71
Discharge: HOME | End: 2021-01-20
Attending: STUDENT IN AN ORGANIZED HEALTH CARE EDUCATION/TRAINING PROGRAM | Admitting: FAMILY MEDICINE

## 2021-01-20 VITALS
BODY MASS INDEX: 28.79 KG/M2 | OXYGEN SATURATION: 98 % | HEIGHT: 68 IN | SYSTOLIC BLOOD PRESSURE: 129 MMHG | DIASTOLIC BLOOD PRESSURE: 67 MMHG | TEMPERATURE: 98.4 F | WEIGHT: 190 LBS | HEART RATE: 81 BPM | RESPIRATION RATE: 18 BRPM

## 2021-01-20 DIAGNOSIS — K59.03 DRUG-INDUCED CONSTIPATION: Primary | ICD-10-CM

## 2021-01-20 PROCEDURE — 74018 RADEX ABDOMEN 1 VIEW: CPT | Performed by: RADIOLOGY

## 2021-01-20 PROCEDURE — 74018 RADEX ABDOMEN 1 VIEW: CPT

## 2021-01-20 PROCEDURE — 99284 EMERGENCY DEPT VISIT MOD MDM: CPT

## 2021-01-20 PROCEDURE — 96372 THER/PROPH/DIAG INJ SC/IM: CPT

## 2021-01-20 PROCEDURE — 25010000002 METHYLNALTREXONE 12 MG/0.6ML SOLUTION: Performed by: PHYSICIAN ASSISTANT

## 2021-01-20 RX ORDER — POLYETHYLENE GLYCOL 3350, SODIUM CHLORIDE, POTASSIUM CHLORIDE, SODIUM BICARBONATE, AND SODIUM SULFATE 240; 5.84; 2.98; 6.72; 22.72 G/4L; G/4L; G/4L; G/4L; G/4L
2000 POWDER, FOR SOLUTION ORAL DAILY
Status: DISCONTINUED | OUTPATIENT
Start: 2021-01-20 | End: 2021-01-21 | Stop reason: HOSPADM

## 2021-01-20 RX ORDER — HYDROCORTISONE ACETATE PRAMOXINE HCL 1; 1 G/100G; G/100G
CREAM TOPICAL DAILY
Status: DISCONTINUED | OUTPATIENT
Start: 2021-01-20 | End: 2021-01-21 | Stop reason: HOSPADM

## 2021-01-20 RX ADMIN — GLYCERIN 1 G: 1 SUPPOSITORY RECTAL at 20:14

## 2021-01-20 RX ADMIN — HYDROCORTISONE ACETATE AND PRAMOXINE HYDROCHLORIDE: 10; 10 CREAM TOPICAL at 22:50

## 2021-01-20 RX ADMIN — METHYLNALTREXONE BROMIDE 12 MG: 12 INJECTION, SOLUTION SUBCUTANEOUS at 20:46

## 2021-01-20 RX ADMIN — POLYETHYLENE GLYCOL 3350, SODIUM CHLORIDE, POTASSIUM CHLORIDE, SODIUM BICARBONATE, AND SODIUM SULFATE 2000 ML: 240; 5.84; 2.98; 6.72; 22.72 POWDER, FOR SOLUTION ORAL at 22:52

## 2021-01-21 ENCOUNTER — OFFICE VISIT (OUTPATIENT)
Dept: SURGERY | Facility: CLINIC | Age: 71
End: 2021-01-21

## 2021-01-21 VITALS
OXYGEN SATURATION: 98 % | SYSTOLIC BLOOD PRESSURE: 125 MMHG | BODY MASS INDEX: 28.79 KG/M2 | RESPIRATION RATE: 18 BRPM | WEIGHT: 190 LBS | HEIGHT: 68 IN | TEMPERATURE: 98.4 F | DIASTOLIC BLOOD PRESSURE: 67 MMHG | HEART RATE: 83 BPM

## 2021-01-21 DIAGNOSIS — C43.62 MALIGNANT MELANOMA OF LEFT UPPER EXTREMITY INCLUDING SHOULDER (HCC): Primary | ICD-10-CM

## 2021-01-21 PROCEDURE — 99024 POSTOP FOLLOW-UP VISIT: CPT | Performed by: SURGERY

## 2021-01-21 NOTE — PROGRESS NOTES
Patient is status post left upper back melanoma excision on 1/13.  Overall she is doing well and has no complaints.  Reports some tightness when reaching with her left arm.  No incisional concerns.  Surgical pathology demonstrated no evidence of melanoma.  There are atypical melanocytic proliferations within the specimen as well as at the margins at the 3 to 6 o'clock position and 6 to 9 o'clock position.  On exam, staples are in place, no signs of infection.  There is melanocytic skin changes that coincide with surgical pathology findings.  The patient states that this has been a birthmark and she has another very similar area inferior to the incision.    We will have the patient return to clinic in 1 week for staple removal.  I would recommend observation of the surrounding area since no further melanoma was found.

## 2021-01-22 NOTE — ED PROVIDER NOTES
Subjective     History provided by:  Patient  Fecal Impaction  Severity:  Moderate  Timing:  Constant  Progression:  Worsening  Chronicity:  New  Context: medication (pt was rx narcotic pain med after surgery)    Stool description:  None produced  Relieved by:  Nothing  Ineffective treatments:  Laxatives, stool softeners and activity  Associated symptoms: abdominal pain    Associated symptoms: no dysuria and no fever    Risk factors: recent surgery        Review of Systems   Constitutional: Negative.  Negative for fever.   HENT: Negative.    Respiratory: Negative.    Cardiovascular: Negative.  Negative for chest pain.   Gastrointestinal: Positive for abdominal pain and constipation.   Endocrine: Negative.    Genitourinary: Negative.  Negative for dysuria.   Skin: Negative.    Neurological: Negative.    Psychiatric/Behavioral: Negative.    All other systems reviewed and are negative.      Past Medical History:   Diagnosis Date   • Arthritis    • Chronic bronchitis (CMS/HCC)    • Coronary artery disease    • Diastolic CHF, chronic (CMS/HCC) 11/5/2020   • Elevated cholesterol    • GERD (gastroesophageal reflux disease)    • History of CVA (cerebrovascular accident)     right sided; 2005   • History of gastric ulcer    • History of transfusion    • Hyperlipidemia    • Hypertension    • Melanoma of skin (CMS/HCC)    • Myocardial infarction (CMS/HCC)     15 years ago   • Normocytic anemia    • Stroke (CMS/HCC)        Allergies   Allergen Reactions   • Sulfa Antibiotics Anaphylaxis       Past Surgical History:   Procedure Laterality Date   • ABDOMINAL SURGERY     • BRAVO PROCEDURE N/A 9/16/2019    Procedure: ESOPHAGOGASTRODUODENOSCOPY AND FRIEDMAN;  Surgeon: Neil Balderrama MD;  Location: [facility] OR;  Service: Gastroenterology   • CARDIAC CATHETERIZATION     • CARDIAC CATHETERIZATION N/A 12/17/2018    Procedure: Left Heart Cath;  Surgeon: Sandip Zamora IV, MD;  Location: [facility] INVASIVE LOCATION;  Service:  Cardiovascular   • CHOLECYSTECTOMY      laparoscopic   • COLONOSCOPY  2014   • ENDOSCOPY     • ENDOSCOPY N/A 11/11/2019    Procedure: ESOPHAGOGASTRODUODENOSCOPY WITH  DILATATION WITH FLUOROSCOPY;  Surgeon: Neil Balderrama MD;  Location: [facility];  Service: Gastroenterology   • FRACTURE SURGERY     • HYSTERECTOMY      benign   • OTHER SURGICAL HISTORY      leg repair   • SKIN BIOPSY Left     shoulder   • STOMACH SURGERY     • TRUNK LESION/CYST EXCISION Left 1/13/2021    Procedure: Skin lesion excision left shoulder;  Surgeon: Neil Balderrama MD;  Location: [facility];  Service: General;  Laterality: Left;       Family History   Problem Relation Age of Onset   • Diabetes Other    • Hypertension Other    • Ovarian cancer Other    • Cancer Mother         bladder   • Cancer Sister         liver   • Diabetes Sister    • Diabetes Brother    • No Known Problems Father    • Breast cancer Neg Hx        Social History     Socioeconomic History   • Marital status:      Spouse name: Not on file   • Number of children: Not on file   • Years of education: Not on file   • Highest education level: Not on file   Tobacco Use   • Smoking status: Never Smoker   • Smokeless tobacco: Never Used   Substance and Sexual Activity   • Alcohol use: No   • Drug use: No   • Sexual activity: Defer           Objective   Physical Exam  Vitals signs and nursing note reviewed.   Constitutional:       General: She is not in acute distress.     Appearance: She is well-developed. She is not diaphoretic.   HENT:      Head: Normocephalic and atraumatic.      Right Ear: External ear normal.      Left Ear: External ear normal.      Nose: Nose normal.   Eyes:      Conjunctiva/sclera: Conjunctivae normal.   Neck:      Musculoskeletal: Normal range of motion and neck supple.      Vascular: No JVD.      Trachea: No tracheal deviation.   Cardiovascular:      Rate and Rhythm: Normal rate and regular rhythm.      Heart sounds: No murmur.   Pulmonary:       Effort: Pulmonary effort is normal. No respiratory distress.      Breath sounds: No wheezing.   Abdominal:      Palpations: Abdomen is soft.      Tenderness: There is abdominal tenderness.   Musculoskeletal: Normal range of motion.         General: No deformity.   Skin:     General: Skin is warm and dry.      Coloration: Skin is not pale.      Findings: No erythema or rash.   Neurological:      Mental Status: She is alert and oriented to person, place, and time.      Cranial Nerves: No cranial nerve deficit.   Psychiatric:         Behavior: Behavior normal.         Thought Content: Thought content normal.         Procedures           ED Course  ED Course as of Jan 22 1129 Wed Jan 20, 2021   1934 XR rad interpreted:  Large stool burden     [RB]   2231 Patient has been able to produce formed stool.  This was done secondary to enema as well as disimpaction by the nurse.  Patient will be sent home with GoLYTELY.  Patient does have follow-up with general surgeon tomorrow.    [RB]      ED Course User Index  [RB] Lenny Hurtado II, PA                                           MDM  Number of Diagnoses or Management Options  Drug-induced constipation: new and requires workup     Amount and/or Complexity of Data Reviewed  Tests in the radiology section of CPT®: ordered and reviewed    Risk of Complications, Morbidity, and/or Mortality  Presenting problems: low  Diagnostic procedures: low  Management options: low    Patient Progress  Patient progress: stable      Final diagnoses:   Drug-induced constipation            Lenny Hurtado II, PA  01/22/21 1131     good, to achieve stated therapy goals

## 2021-01-27 ENCOUNTER — HOSPITAL ENCOUNTER (OUTPATIENT)
Dept: MAMMOGRAPHY | Facility: HOSPITAL | Age: 71
Discharge: HOME | End: 2021-01-27
Admitting: FAMILY MEDICINE

## 2021-01-27 ENCOUNTER — OFFICE VISIT (OUTPATIENT)
Dept: SURGERY | Facility: CLINIC | Age: 71
End: 2021-01-27

## 2021-01-27 VITALS
DIASTOLIC BLOOD PRESSURE: 69 MMHG | HEIGHT: 68 IN | TEMPERATURE: 98.3 F | BODY MASS INDEX: 28.79 KG/M2 | WEIGHT: 190 LBS | HEART RATE: 81 BPM | SYSTOLIC BLOOD PRESSURE: 122 MMHG

## 2021-01-27 DIAGNOSIS — Z12.31 VISIT FOR SCREENING MAMMOGRAM: ICD-10-CM

## 2021-01-27 DIAGNOSIS — C43.62 MALIGNANT MELANOMA OF LEFT UPPER EXTREMITY INCLUDING SHOULDER (HCC): Primary | ICD-10-CM

## 2021-01-27 PROCEDURE — 77067 SCR MAMMO BI INCL CAD: CPT

## 2021-01-27 PROCEDURE — 77063 BREAST TOMOSYNTHESIS BI: CPT | Performed by: RADIOLOGY

## 2021-01-27 PROCEDURE — 77063 BREAST TOMOSYNTHESIS BI: CPT

## 2021-01-27 PROCEDURE — 77067 SCR MAMMO BI INCL CAD: CPT | Performed by: RADIOLOGY

## 2021-01-27 PROCEDURE — 99024 POSTOP FOLLOW-UP VISIT: CPT | Performed by: SURGERY

## 2021-02-25 DIAGNOSIS — Z23 IMMUNIZATION DUE: ICD-10-CM

## 2021-03-18 ENCOUNTER — OFFICE VISIT (OUTPATIENT)
Dept: SURGERY | Facility: CLINIC | Age: 71
End: 2021-03-18

## 2021-03-18 VITALS
RESPIRATION RATE: 17 BRPM | DIASTOLIC BLOOD PRESSURE: 63 MMHG | HEART RATE: 85 BPM | HEIGHT: 68 IN | TEMPERATURE: 98.3 F | OXYGEN SATURATION: 98 % | SYSTOLIC BLOOD PRESSURE: 124 MMHG | WEIGHT: 213.6 LBS | BODY MASS INDEX: 32.37 KG/M2

## 2021-03-18 DIAGNOSIS — C43.62 MALIGNANT MELANOMA OF LEFT UPPER EXTREMITY INCLUDING SHOULDER (HCC): Primary | ICD-10-CM

## 2021-03-18 PROCEDURE — 99212 OFFICE O/P EST SF 10 MIN: CPT | Performed by: SURGERY

## 2021-03-18 NOTE — PROGRESS NOTES
Ms. Vazquez is a 70-year-old female who had a left upper back melanoma excised via a large incision.  Her staples were removed at the end of January.  Pathology demonstrated no residual melanoma.  Patient represents to the office with intermittent burning pain overlying the entirety of the left scapula and radiating towards the left clavicle.  Does not radiate down the left arm and there is no associated chest pain.  This happens 2-3 times per day and last approximately 20 minutes.  She is unable to determine any aggravating or alleviating factors.  She has tried icy hot which did not help.  It is not associated with any shoulder or neck movements.  Patient has constant headaches and neck pain chronically and is due to see a neurologist in the future.    This may be cutaneous nerve related due to the large size of the incision by the not believe this to be of any danger to the patient.  My hope is that this will improve with time.

## 2021-05-07 ENCOUNTER — HOSPITAL ENCOUNTER (OUTPATIENT)
Dept: GENERAL RADIOLOGY | Facility: HOSPITAL | Age: 71
Discharge: HOME | End: 2021-05-07
Admitting: PSYCHIATRY & NEUROLOGY

## 2021-05-07 ENCOUNTER — TRANSCRIBE ORDERS (OUTPATIENT)
Dept: ADMINISTRATIVE | Facility: HOSPITAL | Age: 71
End: 2021-05-07

## 2021-05-07 DIAGNOSIS — M54.2 CERVICAL PAIN: ICD-10-CM

## 2021-05-07 DIAGNOSIS — M54.2 CERVICAL PAIN: Primary | ICD-10-CM

## 2021-05-07 PROCEDURE — 72040 X-RAY EXAM NECK SPINE 2-3 VW: CPT

## 2021-05-07 PROCEDURE — 72040 X-RAY EXAM NECK SPINE 2-3 VW: CPT | Performed by: RADIOLOGY

## 2021-06-15 ENCOUNTER — OFFICE VISIT (OUTPATIENT)
Dept: SURGERY | Facility: CLINIC | Age: 71
End: 2021-06-15

## 2021-06-15 VITALS — HEIGHT: 68 IN | WEIGHT: 213 LBS | BODY MASS INDEX: 32.28 KG/M2

## 2021-06-15 DIAGNOSIS — R59.1 LYMPHADENOPATHY: Primary | ICD-10-CM

## 2021-06-15 PROCEDURE — 99213 OFFICE O/P EST LOW 20 MIN: CPT | Performed by: SURGERY

## 2021-06-15 PROCEDURE — 38500 BIOPSY/REMOVAL LYMPH NODES: CPT | Performed by: SURGERY

## 2021-06-15 RX ORDER — LEVETIRACETAM 500 MG/1
TABLET ORAL
COMMUNITY
Start: 2021-06-07 | End: 2022-12-09

## 2021-06-15 NOTE — PROGRESS NOTES
Subjective   Lyla Vazquez is a 70 y.o. female is being seen for consultation today at the request of Joyce Braden MD    Lyla Vazquez is a 70 y.o. female With a history of melanoma of the left upper back.  She underwent wide excision and negative sentinel lymph node biopsy in early 2021.  She has developed a small nodule that is 1 cm in diameter consistent with an enlarged lymph node just above and medial to the scar of her wide excision.  No new skin findings or other abnormalities.  No lymphadenopathy in the axilla or supraclavicular region.      Past Medical History:   Diagnosis Date   • Arthritis    • Chronic bronchitis (CMS/HCC)    • Coronary artery disease    • Diastolic CHF, chronic (CMS/HCC) 11/5/2020   • Elevated cholesterol    • GERD (gastroesophageal reflux disease)    • History of CVA (cerebrovascular accident)     right sided; 2005   • History of gastric ulcer    • History of transfusion    • Hyperlipidemia    • Hypertension    • Melanoma of skin (CMS/HCC)    • Myocardial infarction (CMS/HCC)     15 years ago   • Normocytic anemia    • Stroke (CMS/HCC)        Family History   Problem Relation Age of Onset   • Diabetes Other    • Hypertension Other    • Ovarian cancer Other    • Cancer Mother         bladder   • Cancer Sister         liver   • Diabetes Sister    • Diabetes Brother    • No Known Problems Father    • Breast cancer Neg Hx        Social History     Socioeconomic History   • Marital status:      Spouse name: Not on file   • Number of children: Not on file   • Years of education: Not on file   • Highest education level: Not on file   Tobacco Use   • Smoking status: Never Smoker   • Smokeless tobacco: Never Used   Vaping Use   • Vaping Use: Never used   Substance and Sexual Activity   • Alcohol use: No   • Drug use: No   • Sexual activity: Defer       Past Surgical History:   Procedure Laterality Date   • ABDOMINAL SURGERY     • BRAVO PROCEDURE N/A 9/16/2019    Procedure:  "ESOPHAGOGASTRODUODENOSCOPY AND FRIEDMAN;  Surgeon: Neil Balderrama MD;  Location:  [facility] OR;  Service: Gastroenterology   • CARDIAC CATHETERIZATION     • CARDIAC CATHETERIZATION N/A 12/17/2018    Procedure: Left Heart Cath;  Surgeon: Sandip Zamora IV, MD;  Location:  [facility];  Service: Cardiovascular   • CHOLECYSTECTOMY      laparoscopic   • COLONOSCOPY  2014   • ENDOSCOPY     • ENDOSCOPY N/A 11/11/2019    Procedure: ESOPHAGOGASTRODUODENOSCOPY WITH  DILATATION WITH FLUOROSCOPY;  Surgeon: Neil Balderrama MD;  Location:  [facility] OR;  Service: Gastroenterology   • FRACTURE SURGERY     • HYSTERECTOMY      benign   • OTHER SURGICAL HISTORY      leg repair   • SKIN BIOPSY Left     shoulder   • STOMACH SURGERY     • TRUNK LESION/CYST EXCISION Left 1/13/2021    Procedure: Skin lesion excision left shoulder;  Surgeon: Neil Balderrama MD;  Location:  [facility] OR;  Service: General;  Laterality: Left;       Review of Systems   Constitutional: Negative for activity change, appetite change, chills and fever.   HENT: Negative for sore throat and trouble swallowing.    Eyes: Negative for visual disturbance.   Respiratory: Negative for cough and shortness of breath.    Cardiovascular: Negative for chest pain and palpitations.   Gastrointestinal: Negative for abdominal distention, abdominal pain, blood in stool, constipation, diarrhea, nausea and vomiting.   Endocrine: Negative for cold intolerance and heat intolerance.   Genitourinary: Negative for dysuria.   Musculoskeletal: Negative for joint swelling.   Skin: Negative for color change, rash and wound.   Allergic/Immunologic: Negative for immunocompromised state.   Neurological: Negative for dizziness, seizures, weakness and headaches.   Hematological: Positive for adenopathy. Does not bruise/bleed easily.   Psychiatric/Behavioral: Negative for agitation and confusion.         Ht 172.7 cm (67.99\")   Wt 96.6 kg (213 lb)   BMI 32.39 kg/m²   Objective "   Physical Exam  Skin:            Comments: Small 1 cm lymph node mobile and nontender just above and medial to her wide excision scar on the left upper back         Excision of enlarged lymph node left upper back    Left upper back prepped and draped in usual sterile fashion.  Timeout procedure was performed.  After injection of local anesthetic dissection was carried down after an incision was made with the 15 blade scalpel.  Dissection was carried through the subcutaneous tissues and the lymph node was removed from the surrounding tissue and hemostatic.  Incision was closed with interrupted nylon suture and dressed with a bandage.    Estimated blood loss: 5 mL    Specimen: Left back lymph node (near previous melanoma wide excision site)    Complications: None      Assessment   Diagnoses and all orders for this visit:    1. Lymphadenopathy (Primary)      Lyla Vazquez is a 70 y.o. female with history of left shoulder melanoma status post wide excision and negative sentinel lymph node biopsy.  She has developed a small nodule superior and medial to the excision scar.  No other findings or new skin changes noted.  Lymph node removed in the office today and sent to pathology.    Patient's Body mass index is 32.39 kg/m². indicating that she is obese (BMI >30). Obesity-related health conditions include the following: GERD. Obesity is unchanged. BMI is is above average; BMI management plan is completed. We discussed portion control and increasing exercise..

## 2021-07-06 ENCOUNTER — OFFICE VISIT (OUTPATIENT)
Dept: SURGERY | Facility: CLINIC | Age: 71
End: 2021-07-06

## 2021-07-06 VITALS — WEIGHT: 213 LBS | BODY MASS INDEX: 32.28 KG/M2 | HEIGHT: 68 IN

## 2021-07-06 DIAGNOSIS — C43.9 MELANOMA OF SKIN (HCC): Primary | ICD-10-CM

## 2021-07-06 PROCEDURE — 99212 OFFICE O/P EST SF 10 MIN: CPT | Performed by: SURGERY

## 2021-07-06 NOTE — PROGRESS NOTES
Subjective   Lyla Vazquez is a 70 y.o. female  is here today for follow-up.         Lyla Vazquez is a 70 y.o. female here for follow up after mass biopsy from the left upper back.  The patient has pathology pending but preliminary is concerning for recurrence.  Biopsy site healing well.  Patient sutures removed.        Assessment     Diagnoses and all orders for this visit:    1. Melanoma of skin (CMS/HCC) (Primary)      Lyla Vazquez is a 70 y.o. female with possible recurrent Left upper back melanoma.  Sutures removed.  Pathology pending.  Follow up in 1 week.

## 2021-07-13 ENCOUNTER — OFFICE VISIT (OUTPATIENT)
Dept: SURGERY | Facility: CLINIC | Age: 71
End: 2021-07-13

## 2021-07-13 VITALS — WEIGHT: 213 LBS | HEIGHT: 68 IN | BODY MASS INDEX: 32.28 KG/M2

## 2021-07-13 DIAGNOSIS — C43.9 MELANOMA OF SKIN (HCC): Primary | ICD-10-CM

## 2021-07-13 PROCEDURE — 99212 OFFICE O/P EST SF 10 MIN: CPT | Performed by: SURGERY

## 2021-07-14 NOTE — PROGRESS NOTES
Subjective   Lyla Vazquez is a 70 y.o. female  is here today for follow-up.         Lyla Vazquez is a 70 y.o. female here for follow up after mass biopsy from the left upper back.  The patient has pathology pending but preliminary is concerning for recurrence.  Biopsy site healing well.        Assessment     Diagnoses and all orders for this visit:    1. Melanoma of skin (CMS/HCC) (Primary)      Lyla Vazquez is a 70 y.o. female with possible recurrent Left upper back melanoma.  Pathology pending.  Follow up in 1 week.

## 2021-08-03 DIAGNOSIS — C43.9 MALIGNANT MELANOMA, UNSPECIFIED SITE (HCC): Primary | ICD-10-CM

## 2021-08-05 ENCOUNTER — TRANSCRIBE ORDERS (OUTPATIENT)
Dept: ADMINISTRATIVE | Facility: HOSPITAL | Age: 71
End: 2021-08-05

## 2021-08-06 DIAGNOSIS — C43.9 MELANOMA OF SKIN (HCC): Primary | ICD-10-CM

## 2021-08-12 DIAGNOSIS — C43.9 MALIGNANT MELANOMA, UNSPECIFIED SITE (HCC): Primary | ICD-10-CM

## 2021-08-12 DIAGNOSIS — C43.62 MALIGNANT MELANOMA OF LEFT UPPER EXTREMITY INCLUDING SHOULDER (HCC): ICD-10-CM

## 2021-08-13 ENCOUNTER — APPOINTMENT (OUTPATIENT)
Dept: PET IMAGING | Facility: HOSPITAL | Age: 71
End: 2021-08-13

## 2021-08-13 ENCOUNTER — HOSPITAL ENCOUNTER (OUTPATIENT)
Dept: PET IMAGING | Facility: HOSPITAL | Age: 71
End: 2021-08-13

## 2021-08-16 ENCOUNTER — HOSPITAL ENCOUNTER (OUTPATIENT)
Dept: PET IMAGING | Facility: HOSPITAL | Age: 71
Discharge: HOME | End: 2021-08-16
Admitting: SURGERY

## 2021-08-16 DIAGNOSIS — C43.62 MALIGNANT MELANOMA OF LEFT UPPER EXTREMITY INCLUDING SHOULDER (HCC): ICD-10-CM

## 2021-08-16 DIAGNOSIS — C43.9 MALIGNANT MELANOMA, UNSPECIFIED SITE (HCC): ICD-10-CM

## 2021-08-16 PROCEDURE — 78815 PET IMAGE W/CT SKULL-THIGH: CPT

## 2021-08-16 PROCEDURE — 78815 PET IMAGE W/CT SKULL-THIGH: CPT | Performed by: RADIOLOGY

## 2021-08-16 PROCEDURE — A9552 F18 FDG: HCPCS | Performed by: SURGERY

## 2021-08-16 PROCEDURE — 0 FLUDEOXYGLUCOSE F18 SOLUTION: Performed by: SURGERY

## 2021-08-16 RX ADMIN — FLUDEOXYGLUCOSE F18 1 DOSE: 300 INJECTION INTRAVENOUS at 09:10

## 2021-08-17 ENCOUNTER — TRANSCRIBE ORDERS (OUTPATIENT)
Dept: ADMINISTRATIVE | Facility: HOSPITAL | Age: 71
End: 2021-08-17

## 2021-08-17 DIAGNOSIS — R51.9 NONINTRACTABLE HEADACHE, UNSPECIFIED CHRONICITY PATTERN, UNSPECIFIED HEADACHE TYPE: Primary | ICD-10-CM

## 2021-08-23 ENCOUNTER — TELEPHONE (OUTPATIENT)
Dept: CARDIOLOGY | Facility: CLINIC | Age: 71
End: 2021-08-23

## 2021-08-23 DIAGNOSIS — Z86.73 HISTORY OF CVA (CEREBROVASCULAR ACCIDENT): ICD-10-CM

## 2021-08-23 DIAGNOSIS — R06.02 SHORTNESS OF BREATH: ICD-10-CM

## 2021-08-23 DIAGNOSIS — I25.119 CORONARY ARTERY DISEASE INVOLVING NATIVE CORONARY ARTERY OF NATIVE HEART WITH ANGINA PECTORIS (HCC): Primary | ICD-10-CM

## 2021-08-23 DIAGNOSIS — I10 ESSENTIAL HYPERTENSION: ICD-10-CM

## 2021-08-23 DIAGNOSIS — I50.32 DIASTOLIC CHF, CHRONIC (HCC): ICD-10-CM

## 2021-08-23 DIAGNOSIS — R55 SYNCOPE AND COLLAPSE: ICD-10-CM

## 2021-08-23 NOTE — TELEPHONE ENCOUNTER
Echo.  CT chest with contrast RO PE.  Can be performed in [place].  What is her BP and HR?   Unlikely she had a heart attack.

## 2021-08-23 NOTE — TELEPHONE ENCOUNTER
PCP wants her seen for chest pain. The patient reports she had an episode at work where she passed out (says she thinks she had a heart attack 7/2021 but did not go to the ER as she could not leave work). She has occasional shortness of breath. She is also currently undergoing workup for her malignant melanoma.     Last [facility] 12/17/2018- Mild CAD. EF normal.    Did you want any testing?

## 2021-08-27 ENCOUNTER — HOSPITAL ENCOUNTER (OUTPATIENT)
Dept: MRI IMAGING | Facility: HOSPITAL | Age: 71
Discharge: HOME | End: 2021-08-27
Admitting: PSYCHIATRY & NEUROLOGY

## 2021-08-27 DIAGNOSIS — R51.9 NONINTRACTABLE HEADACHE, UNSPECIFIED CHRONICITY PATTERN, UNSPECIFIED HEADACHE TYPE: ICD-10-CM

## 2021-08-27 PROCEDURE — 72141 MRI NECK SPINE W/O DYE: CPT | Performed by: RADIOLOGY

## 2021-08-27 PROCEDURE — 72141 MRI NECK SPINE W/O DYE: CPT

## 2021-08-31 ENCOUNTER — OFFICE VISIT (OUTPATIENT)
Dept: SURGERY | Facility: CLINIC | Age: 71
End: 2021-08-31

## 2021-08-31 VITALS — WEIGHT: 213 LBS | BODY MASS INDEX: 32.28 KG/M2 | HEIGHT: 68 IN

## 2021-08-31 DIAGNOSIS — C43.9 METASTATIC MELANOMA (HCC): Primary | ICD-10-CM

## 2021-08-31 PROCEDURE — 99212 OFFICE O/P EST SF 10 MIN: CPT | Performed by: SURGERY

## 2021-09-01 ENCOUNTER — TELEPHONE (OUTPATIENT)
Dept: SURGERY | Facility: CLINIC | Age: 71
End: 2021-09-01

## 2021-09-01 NOTE — PROGRESS NOTES
Subjective   Lyla Vazquez is a 70 y.o. female  is here today for follow-up.         Lyla Vazquez is a 70 y.o. female here for follow up after mass biopsy from the left upper back.  The patient underwent bronchoscopy.  Area medial to her previous melanoma excised from the left upper back.  Unfortunately findings are consistent with metastatic melanoma.  The patient has PET/CT showing some perihilar and paratracheal lymphadenopathy.  No other abnormalities identified and no skin lesions are noted.       Assessment     Diagnoses and all orders for this visit:    1. Melanoma of skin (CMS/HCC) (Primary)      Lyla Vazquez is a 70 y.o. female with recurrent Left upper back melanoma.  Pathology is consistent with recurrent melanoma and PET scan shows pretracheal lymphadenopathy.  Due to advanced disease the patient will be referred to [facility] for further management of her melanoma.

## 2021-09-01 NOTE — TELEPHONE ENCOUNTER
Talked with patient about appointment in [place] dr. Sissy spencer September 9th @ 11:15 [address] [facility] [address] [facility]

## 2021-09-09 ENCOUNTER — APPOINTMENT (OUTPATIENT)
Dept: CARDIOLOGY | Facility: HOSPITAL | Age: 71
End: 2021-09-09

## 2021-09-09 ENCOUNTER — APPOINTMENT (OUTPATIENT)
Dept: CT IMAGING | Facility: HOSPITAL | Age: 71
End: 2021-09-09

## 2021-09-20 ENCOUNTER — HOSPITAL ENCOUNTER (OUTPATIENT)
Dept: CARDIOLOGY | Facility: HOSPITAL | Age: 71
Discharge: HOME | End: 2021-09-20

## 2021-09-20 ENCOUNTER — HOSPITAL ENCOUNTER (OUTPATIENT)
Dept: CT IMAGING | Facility: HOSPITAL | Age: 71
Discharge: HOME | End: 2021-09-20

## 2021-09-20 DIAGNOSIS — R55 SYNCOPE AND COLLAPSE: ICD-10-CM

## 2021-09-20 DIAGNOSIS — Z86.73 HISTORY OF CVA (CEREBROVASCULAR ACCIDENT): ICD-10-CM

## 2021-09-20 DIAGNOSIS — I50.32 DIASTOLIC CHF, CHRONIC (HCC): ICD-10-CM

## 2021-09-20 DIAGNOSIS — I10 ESSENTIAL HYPERTENSION: ICD-10-CM

## 2021-09-20 DIAGNOSIS — R06.02 SHORTNESS OF BREATH: ICD-10-CM

## 2021-09-20 DIAGNOSIS — I25.119 CORONARY ARTERY DISEASE INVOLVING NATIVE CORONARY ARTERY OF NATIVE HEART WITH ANGINA PECTORIS (HCC): ICD-10-CM

## 2021-09-20 LAB — CREAT BLDA-MCNC: 1.1 MG/DL (ref 0.6–1.3)

## 2021-09-20 PROCEDURE — 82565 ASSAY OF CREATININE: CPT

## 2021-09-20 PROCEDURE — 71275 CT ANGIOGRAPHY CHEST: CPT | Performed by: RADIOLOGY

## 2021-09-20 PROCEDURE — 0 IOPAMIDOL PER 1 ML: Performed by: INTERNAL MEDICINE

## 2021-09-20 PROCEDURE — 93306 TTE W/DOPPLER COMPLETE: CPT

## 2021-09-20 PROCEDURE — 71275 CT ANGIOGRAPHY CHEST: CPT

## 2021-09-20 PROCEDURE — 93306 TTE W/DOPPLER COMPLETE: CPT | Performed by: INTERNAL MEDICINE

## 2021-09-20 RX ADMIN — IOPAMIDOL 100 ML: 755 INJECTION, SOLUTION INTRAVENOUS at 09:21

## 2021-09-21 ENCOUNTER — TELEPHONE (OUTPATIENT)
Dept: CARDIOLOGY | Facility: CLINIC | Age: 71
End: 2021-09-21

## 2021-09-21 LAB
BH CV ECHO MEAS - ACS: 1.9 CM
BH CV ECHO MEAS - AI DEC SLOPE: 169.5 CM/SEC^2
BH CV ECHO MEAS - AI MAX PG: 26.7 MMHG
BH CV ECHO MEAS - AI MAX VEL: 255 CM/SEC
BH CV ECHO MEAS - AI P1/2T: 440.6 MSEC
BH CV ECHO MEAS - AO MAX PG: 7.1 MMHG
BH CV ECHO MEAS - AO ROOT AREA (BSA CORRECTED): 1.3
BH CV ECHO MEAS - AO ROOT AREA: 5.7 CM^2
BH CV ECHO MEAS - AO ROOT DIAM: 2.7 CM
BH CV ECHO MEAS - AO V2 MAX: 133 CM/SEC
BH CV ECHO MEAS - BSA(HAYCOCK): 2.2 M^2
BH CV ECHO MEAS - BSA: 2.1 M^2
BH CV ECHO MEAS - BZI_BMI: 33.4 KILOGRAMS/M^2
BH CV ECHO MEAS - BZI_METRIC_HEIGHT: 170.2 CM
BH CV ECHO MEAS - BZI_METRIC_WEIGHT: 96.6 KG
BH CV ECHO MEAS - EDV(CUBED): 148.9 ML
BH CV ECHO MEAS - EDV(TEICH): 135.3 ML
BH CV ECHO MEAS - EF(CUBED): 75.9 %
BH CV ECHO MEAS - EF(TEICH): 67.4 %
BH CV ECHO MEAS - ESV(CUBED): 35.9 ML
BH CV ECHO MEAS - ESV(TEICH): 44.1 ML
BH CV ECHO MEAS - FS: 37.7 %
BH CV ECHO MEAS - IVS/LVPW: 0.88
BH CV ECHO MEAS - IVSD: 0.7 CM
BH CV ECHO MEAS - LA DIMENSION: 4 CM
BH CV ECHO MEAS - LA/AO: 1.5
BH CV ECHO MEAS - LV MASS(C)D: 138.3 GRAMS
BH CV ECHO MEAS - LV MASS(C)DI: 66.6 GRAMS/M^2
BH CV ECHO MEAS - LVIDD: 5.3 CM
BH CV ECHO MEAS - LVIDS: 3.3 CM
BH CV ECHO MEAS - LVOT AREA (M): 2.5 CM^2
BH CV ECHO MEAS - LVOT AREA: 2.5 CM^2
BH CV ECHO MEAS - LVOT DIAM: 1.8 CM
BH CV ECHO MEAS - LVPWD: 0.8 CM
BH CV ECHO MEAS - MV A MAX VEL: 105 CM/SEC
BH CV ECHO MEAS - MV DEC SLOPE: 448 CM/SEC^2
BH CV ECHO MEAS - MV DEC TIME: 0.24 SEC
BH CV ECHO MEAS - MV E MAX VEL: 109 CM/SEC
BH CV ECHO MEAS - MV E/A: 1
BH CV ECHO MEAS - PA MAX PG: 2.7 MMHG
BH CV ECHO MEAS - PA V2 MAX: 81.4 CM/SEC
BH CV ECHO MEAS - RAP SYSTOLE: 3 MMHG
BH CV ECHO MEAS - RVSP: 32.4 MMHG
BH CV ECHO MEAS - SI(CUBED): 54.4 ML/M^2
BH CV ECHO MEAS - SI(TEICH): 43.9 ML/M^2
BH CV ECHO MEAS - SV(CUBED): 112.9 ML
BH CV ECHO MEAS - SV(TEICH): 91.2 ML
BH CV ECHO MEAS - TR MAX VEL: 271 CM/SEC
LV EF 2D ECHO EST: 60 %
MAXIMAL PREDICTED HEART RATE: 151 BPM
STRESS TARGET HR: 128 BPM

## 2021-09-21 NOTE — TELEPHONE ENCOUNTER
----- Message from Sandip Zamora IV, MD sent at 9/21/2021  2:00 PM EDT -----  Let her know that she has a lymph node near her trachea that is enlarged and consistent with metastatic melanoma

## 2021-09-21 NOTE — PROGRESS NOTES
Let her know that she has a lymph node near her trachea that is enlarged and consistent with metastatic melanoma

## 2021-09-21 NOTE — PROGRESS NOTES
Let her know that her echo is normal.  Her CT scan shows evidence of metastatic melanoma in her mediastinal lymph nodes.  No further cardiac testing recommended at this time.

## 2021-09-22 ENCOUNTER — TELEPHONE (OUTPATIENT)
Dept: CARDIOLOGY | Facility: CLINIC | Age: 71
End: 2021-09-22

## 2021-09-22 NOTE — TELEPHONE ENCOUNTER
----- Message from Sandip Zamora IV, MD sent at 9/21/2021  2:00 PM EDT -----  Let her know that her echo is normal.  Her CT scan shows evidence of metastatic melanoma in her mediastinal lymph nodes.  No further cardiac testing recommended at this time.

## 2022-05-11 ENCOUNTER — HOSPITAL ENCOUNTER (EMERGENCY)
Facility: HOSPITAL | Age: 72
Discharge: HOME | End: 2022-05-11
Attending: STUDENT IN AN ORGANIZED HEALTH CARE EDUCATION/TRAINING PROGRAM | Admitting: STUDENT IN AN ORGANIZED HEALTH CARE EDUCATION/TRAINING PROGRAM

## 2022-05-11 ENCOUNTER — APPOINTMENT (OUTPATIENT)
Dept: CT IMAGING | Facility: HOSPITAL | Age: 72
End: 2022-05-11

## 2022-05-11 VITALS
WEIGHT: 187.39 LBS | TEMPERATURE: 97.8 F | SYSTOLIC BLOOD PRESSURE: 108 MMHG | BODY MASS INDEX: 28.4 KG/M2 | OXYGEN SATURATION: 98 % | HEART RATE: 71 BPM | HEIGHT: 68 IN | RESPIRATION RATE: 18 BRPM | DIASTOLIC BLOOD PRESSURE: 56 MMHG

## 2022-05-11 DIAGNOSIS — R51.9 CHRONIC NONINTRACTABLE HEADACHE, UNSPECIFIED HEADACHE TYPE: Primary | ICD-10-CM

## 2022-05-11 DIAGNOSIS — G89.29 CHRONIC NONINTRACTABLE HEADACHE, UNSPECIFIED HEADACHE TYPE: Primary | ICD-10-CM

## 2022-05-11 LAB
ALBUMIN SERPL-MCNC: 3.63 G/DL (ref 3.5–5.2)
ALBUMIN/GLOB SERPL: 1.5 G/DL
ALP SERPL-CCNC: 96 U/L (ref 39–117)
ALT SERPL W P-5'-P-CCNC: <5 U/L (ref 1–33)
ANION GAP SERPL CALCULATED.3IONS-SCNC: 9.9 MMOL/L (ref 5–15)
ANISOCYTOSIS BLD QL: ABNORMAL
AST SERPL-CCNC: 10 U/L (ref 1–32)
BILIRUB SERPL-MCNC: 0.5 MG/DL (ref 0–1.2)
BUN SERPL-MCNC: 20 MG/DL (ref 8–23)
BUN/CREAT SERPL: 20.2 (ref 7–25)
CALCIUM SPEC-SCNC: 9.4 MG/DL (ref 8.6–10.5)
CHLORIDE SERPL-SCNC: 103 MMOL/L (ref 98–107)
CO2 SERPL-SCNC: 26.1 MMOL/L (ref 22–29)
CREAT SERPL-MCNC: 0.99 MG/DL (ref 0.57–1)
CRP SERPL-MCNC: <0.3 MG/DL (ref 0–0.5)
DEPRECATED RDW RBC AUTO: 58.3 FL (ref 37–54)
EGFRCR SERPLBLD CKD-EPI 2021: 61.9 ML/MIN/1.73
ERYTHROCYTE [DISTWIDTH] IN BLOOD BY AUTOMATED COUNT: 17.6 % (ref 12.3–15.4)
ERYTHROCYTE [SEDIMENTATION RATE] IN BLOOD: 6 MM/HR (ref 0–30)
GLOBULIN UR ELPH-MCNC: 2.5 GM/DL
GLUCOSE SERPL-MCNC: 91 MG/DL (ref 65–99)
HCT VFR BLD AUTO: 30.2 % (ref 34–46.6)
HGB BLD-MCNC: 9.6 G/DL (ref 12–15.9)
HOLD SPECIMEN: NORMAL
HOLD SPECIMEN: NORMAL
HYPOCHROMIA BLD QL: ABNORMAL
LYMPHOCYTES # BLD MANUAL: 0.37 10*3/MM3 (ref 0.7–3.1)
LYMPHOCYTES NFR BLD MANUAL: 6 % (ref 5–12)
MAGNESIUM SERPL-MCNC: 1.8 MG/DL (ref 1.6–2.4)
MCH RBC QN AUTO: 28.8 PG (ref 26.6–33)
MCHC RBC AUTO-ENTMCNC: 31.8 G/DL (ref 31.5–35.7)
MCV RBC AUTO: 90.7 FL (ref 79–97)
MONOCYTES # BLD: 0.2 10*3/MM3 (ref 0.1–0.9)
NEUTROPHILS # BLD AUTO: 2.79 10*3/MM3 (ref 1.7–7)
NEUTROPHILS NFR BLD MANUAL: 83 % (ref 42.7–76)
OVALOCYTES BLD QL SMEAR: ABNORMAL
PLAT MORPH BLD: NORMAL
PLATELET # BLD AUTO: 121 10*3/MM3 (ref 140–450)
PMV BLD AUTO: 12 FL (ref 6–12)
POIKILOCYTOSIS BLD QL SMEAR: ABNORMAL
POTASSIUM SERPL-SCNC: 3.6 MMOL/L (ref 3.5–5.2)
PROT SERPL-MCNC: 6.1 G/DL (ref 6–8.5)
RBC # BLD AUTO: 3.33 10*6/MM3 (ref 3.77–5.28)
SODIUM SERPL-SCNC: 139 MMOL/L (ref 136–145)
VARIANT LYMPHS NFR BLD MANUAL: 11 % (ref 19.6–45.3)
WBC NRBC COR # BLD: 3.36 10*3/MM3 (ref 3.4–10.8)
WHOLE BLOOD HOLD COAG: NORMAL
WHOLE BLOOD HOLD SPECIMEN: NORMAL

## 2022-05-11 PROCEDURE — 25010000002 PROCHLORPERAZINE 10 MG/2ML SOLUTION: Performed by: PHYSICIAN ASSISTANT

## 2022-05-11 PROCEDURE — 85025 COMPLETE CBC W/AUTO DIFF WBC: CPT | Performed by: PHYSICIAN ASSISTANT

## 2022-05-11 PROCEDURE — 25010000002 KETOROLAC TROMETHAMINE PER 15 MG: Performed by: PHYSICIAN ASSISTANT

## 2022-05-11 PROCEDURE — 99284 EMERGENCY DEPT VISIT MOD MDM: CPT

## 2022-05-11 PROCEDURE — 70450 CT HEAD/BRAIN W/O DYE: CPT | Performed by: RADIOLOGY

## 2022-05-11 PROCEDURE — 96375 TX/PRO/DX INJ NEW DRUG ADDON: CPT

## 2022-05-11 PROCEDURE — 99283 EMERGENCY DEPT VISIT LOW MDM: CPT

## 2022-05-11 PROCEDURE — 85652 RBC SED RATE AUTOMATED: CPT | Performed by: PHYSICIAN ASSISTANT

## 2022-05-11 PROCEDURE — 86140 C-REACTIVE PROTEIN: CPT | Performed by: PHYSICIAN ASSISTANT

## 2022-05-11 PROCEDURE — 70450 CT HEAD/BRAIN W/O DYE: CPT

## 2022-05-11 PROCEDURE — 85007 BL SMEAR W/DIFF WBC COUNT: CPT | Performed by: PHYSICIAN ASSISTANT

## 2022-05-11 PROCEDURE — 96374 THER/PROPH/DIAG INJ IV PUSH: CPT

## 2022-05-11 PROCEDURE — 80053 COMPREHEN METABOLIC PANEL: CPT | Performed by: PHYSICIAN ASSISTANT

## 2022-05-11 PROCEDURE — 83735 ASSAY OF MAGNESIUM: CPT | Performed by: PHYSICIAN ASSISTANT

## 2022-05-11 RX ORDER — PROCHLORPERAZINE EDISYLATE 5 MG/ML
5 INJECTION INTRAMUSCULAR; INTRAVENOUS ONCE
Status: COMPLETED | OUTPATIENT
Start: 2022-05-11 | End: 2022-05-11

## 2022-05-11 RX ORDER — KETOROLAC TROMETHAMINE 30 MG/ML
15 INJECTION, SOLUTION INTRAMUSCULAR; INTRAVENOUS ONCE
Status: COMPLETED | OUTPATIENT
Start: 2022-05-11 | End: 2022-05-11

## 2022-05-11 RX ORDER — SODIUM CHLORIDE 0.9 % (FLUSH) 0.9 %
10 SYRINGE (ML) INJECTION AS NEEDED
Status: DISCONTINUED | OUTPATIENT
Start: 2022-05-11 | End: 2022-05-11 | Stop reason: HOSPADM

## 2022-05-11 RX ADMIN — SODIUM CHLORIDE 1000 ML: 9 INJECTION, SOLUTION INTRAVENOUS at 10:10

## 2022-05-11 RX ADMIN — KETOROLAC TROMETHAMINE 15 MG: 30 INJECTION, SOLUTION INTRAMUSCULAR at 10:11

## 2022-05-11 RX ADMIN — PROCHLORPERAZINE EDISYLATE 5 MG: 5 INJECTION INTRAMUSCULAR; INTRAVENOUS at 10:11

## 2022-05-11 NOTE — ED PROVIDER NOTES
Subjective   71-year-old female who presents to the ED today due to a headache.  She states she has had headaches for about 1 year.  She states this started due to radiation treatments.  She states she completed her radiation treatments about 6 weeks ago but she continues to have headaches.  She states she has tried many different things to help with her headaches without much relief.  She states she went to a pain clinic in [place] earlier this week, about 2 days ago and had injections at the base of her skull.  She is not exactly sure what kind of injections they were.  She states now it seems like her headaches are worse.  She states she has had a little nausea but denies any vomiting.  She states she went to work today and her boss was yelling at her and this caused her symptoms to be worse so she left and came to the ER for an evaluation.      History provided by:  Patient  Headache  Pain location:  Occipital and L parietal  Quality:  Dull  Radiates to:  Does not radiate  Severity currently:  10/10  Severity at highest:  10/10  Onset quality:  Gradual  Duration: 1 year.  Timing:  Constant  Progression:  Waxing and waning  Chronicity:  Chronic  Similar to prior headaches: yes    Context: activity    Relieved by:  Nothing  Worsened by:  Activity  Associated symptoms: nausea    Associated symptoms: no abdominal pain, no back pain, no blurred vision, no congestion, no cough, no diarrhea, no dizziness, no drainage, no ear pain, no eye pain, no facial pain, no fatigue, no fever, no focal weakness, no hearing loss, no loss of balance, no myalgias, no near-syncope, no neck pain, no neck stiffness, no numbness, no paresthesias, no photophobia, no seizures, no sinus pressure, no sore throat, no swollen glands, no syncope, no tingling, no URI, no visual change, no vomiting and no weakness        Review of Systems   Constitutional: Negative.  Negative for fatigue and fever.   HENT: Negative for congestion, ear pain, hearing  loss, postnasal drip, sinus pressure and sore throat.    Eyes: Negative for blurred vision, photophobia and pain.   Respiratory: Negative for cough.    Cardiovascular: Negative for syncope and near-syncope.   Gastrointestinal: Positive for nausea. Negative for abdominal pain, diarrhea and vomiting.   Genitourinary: Negative.    Musculoskeletal: Negative for back pain, myalgias, neck pain and neck stiffness.   Skin: Negative.    Neurological: Positive for headaches. Negative for dizziness, focal weakness, seizures, weakness, numbness, paresthesias and loss of balance.   Psychiatric/Behavioral: Negative.    All other systems reviewed and are negative.      Past Medical History:   Diagnosis Date   • Arthritis    • Chronic bronchitis (CMS/HCC)    • Coronary artery disease    • Diastolic CHF, chronic (CMS/HCC) 11/5/2020   • Elevated cholesterol    • GERD (gastroesophageal reflux disease)    • History of CVA (cerebrovascular accident)     right sided; 2005   • History of gastric ulcer    • History of transfusion    • Hyperlipidemia    • Hypertension    • Melanoma of skin (CMS/HCC)    • Myocardial infarction (CMS/HCC)     15 years ago   • Normocytic anemia    • Stroke (CMS/HCC)        Allergies   Allergen Reactions   • Sulfa Antibiotics Anaphylaxis       Past Surgical History:   Procedure Laterality Date   • ABDOMINAL SURGERY     • BRAVO PROCEDURE N/A 9/16/2019    Procedure: ESOPHAGOGASTRODUODENOSCOPY AND FRIEDMAN;  Surgeon: Neil Balderrama MD;  Location:  [facility];  Service: Gastroenterology   • CARDIAC CATHETERIZATION     • CARDIAC CATHETERIZATION N/A 12/17/2018    Procedure: Left Heart Cath;  Surgeon: Sandip Zamora IV, MD;  Location:  [facility];  Service: Cardiovascular   • CHOLECYSTECTOMY      laparoscopic   • COLONOSCOPY  2014   • ENDOSCOPY     • ENDOSCOPY N/A 11/11/2019    Procedure: ESOPHAGOGASTRODUODENOSCOPY WITH  DILATATION WITH FLUOROSCOPY;  Surgeon: Neil Balderrama MD;  Location:   [facility];  Service: Gastroenterology   • FRACTURE SURGERY     • HYSTERECTOMY      benign   • OTHER SURGICAL HISTORY      leg repair   • SKIN BIOPSY Left     shoulder   • STOMACH SURGERY     • TRUNK LESION/CYST EXCISION Left 1/13/2021    Procedure: Skin lesion excision left shoulder;  Surgeon: Neil Balderrama MD;  Location:  [facility];  Service: General;  Laterality: Left;       Family History   Problem Relation Age of Onset   • Diabetes Other    • Hypertension Other    • Ovarian cancer Other    • Cancer Mother         bladder   • Cancer Sister         liver   • Diabetes Sister    • Diabetes Brother    • No Known Problems Father    • Breast cancer Neg Hx        Social History     Socioeconomic History   • Marital status:    Tobacco Use   • Smoking status: Never Smoker   • Smokeless tobacco: Never Used   Vaping Use   • Vaping Use: Never used   Substance and Sexual Activity   • Alcohol use: No   • Drug use: No   • Sexual activity: Defer           Objective   Physical Exam  Vitals and nursing note reviewed.   Constitutional:       General: She is not in acute distress.     Appearance: Normal appearance. She is not diaphoretic.   HENT:      Head: Normocephalic and atraumatic.      Right Ear: External ear normal.      Left Ear: External ear normal.      Nose: Nose normal.      Mouth/Throat:      Mouth: Mucous membranes are moist.      Pharynx: Oropharynx is clear.   Eyes:      Extraocular Movements: Extraocular movements intact.      Conjunctiva/sclera: Conjunctivae normal.      Pupils: Pupils are equal, round, and reactive to light.   Cardiovascular:      Rate and Rhythm: Normal rate and regular rhythm.      Pulses: Normal pulses.      Heart sounds: Normal heart sounds.   Pulmonary:      Effort: Pulmonary effort is normal.      Breath sounds: Normal breath sounds.   Abdominal:      General: Bowel sounds are normal.      Palpations: Abdomen is soft.      Tenderness: There is no abdominal tenderness.    Musculoskeletal:         General: Normal range of motion.      Cervical back: Normal range of motion and neck supple. No rigidity or tenderness.   Lymphadenopathy:      Cervical: No cervical adenopathy.   Skin:     General: Skin is warm and dry.      Capillary Refill: Capillary refill takes less than 2 seconds.   Neurological:      General: No focal deficit present.      Mental Status: She is alert and oriented to person, place, and time.      Cranial Nerves: No cranial nerve deficit.      Sensory: No sensory deficit.      Motor: No weakness.      Coordination: Coordination normal.   Psychiatric:         Mood and Affect: Mood normal.         Procedures           ED Course  ED Course as of 05/11/22 1135   Wed May 11, 2022   1059 CT Head Without Contrast  FINDINGS:    BRAIN:  Unremarkable.  No hemorrhage.  No significant white matter  disease.  No edema.    VENTRICLES:  Unremarkable.  No ventriculomegaly.    BONES/JOINTS:  Unremarkable.  No acute fracture.    SOFT TISSUES:  Unremarkable.    SINUSES:  Unremarkable as visualized.  No acute sinusitis.    MASTOID AIR CELLS:  Right mastoid air cell opacification.     IMPRESSION:    Right mastoid air cell opacification. [AH]   1107 Patient reports that her headache is improving.  She will be able to be discharged home to follow-up outpatient.  She will return to the ED if her symptoms change or worsen. [AH]      ED Course User Index  [AH] Joanne Mascorro PA                                                 MDM  Number of Diagnoses or Management Options     Amount and/or Complexity of Data Reviewed  Clinical lab tests: reviewed  Tests in the radiology section of CPT®: reviewed  Decide to obtain previous medical records or to obtain history from someone other than the patient: yes  Discuss the patient with other providers: yes (Dr. Ovalles)    Patient Progress  Patient progress: improved      Final diagnoses:   Chronic nonintractable headache, unspecified headache type       ED  Disposition  ED Disposition     ED Disposition   Discharge    Condition   Stable    Comment   --             Joyce Braden MD  [address] [place], [place] [id number] [phone]    Schedule an appointment as soon as possible for a visit in 2 days           Medication List      Stop    oxyCODONE-acetaminophen 5-325 MG per tablet  Commonly known as: PERCOCET             Joanne Mascorro, PA  05/11/22 1139

## 2022-09-08 ENCOUNTER — HOSPITAL ENCOUNTER (OUTPATIENT)
Dept: ULTRASOUND IMAGING | Facility: HOSPITAL | Age: 72
Discharge: HOME | End: 2022-09-08
Admitting: FAMILY MEDICINE

## 2022-09-08 ENCOUNTER — TRANSCRIBE ORDERS (OUTPATIENT)
Dept: ULTRASOUND IMAGING | Facility: HOSPITAL | Age: 72
End: 2022-09-08

## 2022-09-08 DIAGNOSIS — R60.0 LOCALIZED EDEMA: ICD-10-CM

## 2022-09-08 DIAGNOSIS — R60.0 LOCALIZED EDEMA: Primary | ICD-10-CM

## 2022-09-08 PROCEDURE — 93971 EXTREMITY STUDY: CPT | Performed by: RADIOLOGY

## 2022-09-08 PROCEDURE — 93971 EXTREMITY STUDY: CPT

## 2022-12-09 ENCOUNTER — OFFICE VISIT (OUTPATIENT)
Dept: CARDIOLOGY | Facility: CLINIC | Age: 72
End: 2022-12-09

## 2022-12-09 VITALS
HEIGHT: 68 IN | DIASTOLIC BLOOD PRESSURE: 76 MMHG | BODY MASS INDEX: 27.43 KG/M2 | OXYGEN SATURATION: 98 % | SYSTOLIC BLOOD PRESSURE: 122 MMHG | WEIGHT: 181 LBS | HEART RATE: 70 BPM

## 2022-12-09 DIAGNOSIS — I10 ESSENTIAL HYPERTENSION: Chronic | ICD-10-CM

## 2022-12-09 DIAGNOSIS — M79.89 RIGHT LEG SWELLING: Primary | ICD-10-CM

## 2022-12-09 PROBLEM — Z87.11 HISTORY OF GASTRIC ULCER: Chronic | Status: RESOLVED | Noted: 2020-11-05 | Resolved: 2022-12-09

## 2022-12-09 PROBLEM — I50.32 CHRONIC DIASTOLIC CONGESTIVE HEART FAILURE (HCC): Status: ACTIVE | Noted: 2020-11-05

## 2022-12-09 PROBLEM — R59.1 LYMPHADENOPATHY: Status: RESOLVED | Noted: 2021-06-15 | Resolved: 2022-12-09

## 2022-12-09 PROBLEM — R29.898 LEFT ARM WEAKNESS: Status: RESOLVED | Noted: 2020-11-04 | Resolved: 2022-12-09

## 2022-12-09 PROBLEM — Z86.73 HISTORY OF CVA (CEREBROVASCULAR ACCIDENT): Status: RESOLVED | Noted: 2020-11-05 | Resolved: 2022-12-09

## 2022-12-09 PROBLEM — I50.32 CHRONIC DIASTOLIC CONGESTIVE HEART FAILURE: Status: RESOLVED | Noted: 2020-11-05 | Resolved: 2022-12-09

## 2022-12-09 PROCEDURE — 93000 ELECTROCARDIOGRAM COMPLETE: CPT | Performed by: INTERNAL MEDICINE

## 2022-12-09 PROCEDURE — 99213 OFFICE O/P EST LOW 20 MIN: CPT | Performed by: INTERNAL MEDICINE

## 2022-12-09 RX ORDER — ALBUTEROL SULFATE 90 UG/1
2 AEROSOL, METERED RESPIRATORY (INHALATION) EVERY 4 HOURS PRN
COMMUNITY

## 2022-12-09 RX ORDER — HYDROCHLOROTHIAZIDE 12.5 MG/1
12.5 TABLET ORAL DAILY
Start: 2022-12-09

## 2022-12-09 RX ORDER — DIAZEPAM 5 MG/1
5 TABLET ORAL 2 TIMES DAILY PRN
COMMUNITY

## 2022-12-09 RX ORDER — NITROGLYCERIN 0.4 MG/1
0.4 TABLET SUBLINGUAL
COMMUNITY

## 2022-12-09 RX ORDER — HYDROCHLOROTHIAZIDE 12.5 MG/1
12.5 TABLET ORAL DAILY
COMMUNITY
End: 2022-12-09 | Stop reason: SDUPTHER

## 2022-12-09 NOTE — ASSESSMENT & PLAN NOTE
· Patient's symptoms have improved with compression stockings  · No evidence of DVT  · Patient not taking calcium channel blockers which would exacerbate swelling  · No further recommendations

## 2022-12-09 NOTE — ASSESSMENT & PLAN NOTE
· Well controlled on hydrochlorothiazide alone  · Lisinopril discontinued for reasons unclear  · Do not recommend hydralazine as blood pressure presently

## 2022-12-09 NOTE — PROGRESS NOTES
"    Cardiology Outpatient Visit      Identification: Lyla Vazquez is a 72 y.o. female who resides in [place]    Reason for visit:  Coronary artery disease involving native coronary artery of (Re-Eval for Lower Extremity Edema)    Assessment     Problem List Items Addressed This Visit        Cardiology Problems    Essential hypertension (Chronic)    Overview     • Target blood pressure <130/80 mmHg         Current Assessment & Plan     · Well controlled on hydrochlorothiazide alone  · Lisinopril discontinued for reasons unclear  · Do not recommend hydralazine as blood pressure presently         Relevant Medications    hydroCHLOROthiazide (HYDRODIURIL) 12.5 MG tablet       Other    Right leg swelling - Primary    Overview     · Negative right lower extremity venous duplex, 9/9/2022         Current Assessment & Plan     · Patient's symptoms have improved with compression stockings  · No evidence of DVT  · Patient not taking calcium channel blockers which would exacerbate swelling  · No further recommendations         Relevant Orders    ECG 12 Lead       Plan   • No new cardiology recommendations      Follow-up   · As needed      Subjective      Mati returns to the office today at the request of Dr. Braden.  Months ago she was experiencing swelling of her right leg.  This was occurring back in September.  She underwent a venous duplex which did not show evidence of DVT.  She subsequently has started using compression stockings and the swelling has essentially resolved.    She presently feels well.  About 2 weeks ago she was admitted to [facility] for a \"light stroke\".  She saw a neurologist in the hospital but has not had follow-up.    Dr. Braden stopped her lisinopril sometime ago.  Reasons for this unclear.  He has prescribed her hydralazine 25 mg twice daily which she has not started taking.  She states her blood pressure at home has been well controlled.  Her blood pressure is well controlled in the " "office today.  She is taking hydrochlorothiazide alone.    Review of Systems   Cardiovascular: Negative.    Respiratory: Negative.        Objective     /76 (BP Location: Right arm, Patient Position: Sitting, Cuff Size: Adult)   Pulse 70   Ht 172.7 cm (68\")   Wt 82.1 kg (181 lb)   SpO2 98%   BMI 27.52 kg/m²       Constitutional:       Appearance: Healthy appearance. Well-developed.   Eyes:      General: Lids are normal. No scleral icterus.  HENT:      Head: Normocephalic and atraumatic.   Neck:      Thyroid: No thyroid mass.      Vascular: No carotid bruit or JVD. JVD normal.   Pulmonary:      Effort: Pulmonary effort is normal.      Breath sounds: Normal breath sounds.   Cardiovascular:      Normal rate. Regular rhythm.      Murmurs: There is no murmur.      No gallop.   Edema:     Ankle: bilateral 1+ edema of the ankle.     Feet: bilateral 1+ edema of the feet.  Musculoskeletal:      Extremities: No clubbing present.Skin:     General: Skin is warm and dry. There is no cyanosis.   Neurological:      General: No focal deficit present.      Mental Status: Alert.   Psychiatric:         Attention and Perception: Attention normal.         Behavior: Behavior normal. Behavior is cooperative.         Result Review  (reviewed with patient):        ECG 12 Lead    Date/Time: 12/9/2022 1:29 PM  Performed by: Sandip Zamora IV, MD  Authorized by: Sandip Zamora IV, MD   Comparison: compared with previous ECG from 11/5/2020  Comparison to previous ECG: Poor R wave progression (from lead placement) now present  Rhythm: sinus rhythm  BPM: 70  Other findings: poor R wave progression    Clinical impression: non-specific ECG             Lab Results   Component Value Date    GLUCOSE 91 05/11/2022    BUN 20 05/11/2022    CREATININE 0.99 05/11/2022    EGFRIFNONA >60 11/05/2021    EGFRIFAFRI >60 11/05/2021    BCR 20.2 05/11/2022    K 3.6 05/11/2022    CO2 26.1 05/11/2022    CALCIUM 9.4 05/11/2022    " PROTENTOTREF 5.7 (L) 10/30/2020    ALBUMIN 3.63 05/11/2022    LABIL2 2.0 10/30/2020    AST 10 05/11/2022    ALT <5 05/11/2022     Lab Results   Component Value Date    WBC 3.36 (L) 05/11/2022    HGB 9.6 (L) 05/11/2022    HCT 30.2 (L) 05/11/2022    MCV 90.7 05/11/2022     (L) 05/11/2022     Lab Results   Component Value Date    CHOL 138 12/16/2018    CHLPL 124 10/30/2020    TRIG 43 10/30/2020    HDL 84 (H) 10/30/2020    LDL 29 10/30/2020     Lab Results   Component Value Date    HGBA1C 5.30 12/16/2018         Alejo Zamora MD, FAC, [facility] [place] [place]  12/9/2022

## 2023-02-23 ENCOUNTER — HOSPITAL ENCOUNTER (OUTPATIENT)
Dept: ULTRASOUND IMAGING | Facility: HOSPITAL | Age: 73
Discharge: HOME | End: 2023-02-23
Admitting: FAMILY MEDICINE
Payer: COMMERCIAL

## 2023-02-23 ENCOUNTER — TRANSCRIBE ORDERS (OUTPATIENT)
Dept: LAB | Facility: HOSPITAL | Age: 73
End: 2023-02-23
Payer: COMMERCIAL

## 2023-02-23 DIAGNOSIS — R60.0 LOCALIZED EDEMA: ICD-10-CM

## 2023-02-23 DIAGNOSIS — R60.0 LOCALIZED EDEMA: Primary | ICD-10-CM

## 2023-02-23 PROCEDURE — 93971 EXTREMITY STUDY: CPT

## 2023-02-23 PROCEDURE — 93971 EXTREMITY STUDY: CPT | Performed by: RADIOLOGY

## 2023-04-17 ENCOUNTER — HOSPITAL ENCOUNTER (OUTPATIENT)
Dept: GENERAL RADIOLOGY | Facility: HOSPITAL | Age: 73
Discharge: HOME | End: 2023-04-17
Payer: COMMERCIAL

## 2023-04-17 ENCOUNTER — TRANSCRIBE ORDERS (OUTPATIENT)
Dept: ADMINISTRATIVE | Facility: HOSPITAL | Age: 73
End: 2023-04-17
Payer: COMMERCIAL

## 2023-04-17 DIAGNOSIS — M25.561 RIGHT KNEE PAIN, UNSPECIFIED CHRONICITY: ICD-10-CM

## 2023-04-17 DIAGNOSIS — M25.551 HIP PAIN, RIGHT: ICD-10-CM

## 2023-04-17 DIAGNOSIS — M25.551 HIP PAIN, RIGHT: Primary | ICD-10-CM

## 2023-04-17 PROCEDURE — 73502 X-RAY EXAM HIP UNI 2-3 VIEWS: CPT | Performed by: RADIOLOGY

## 2023-04-17 PROCEDURE — 73560 X-RAY EXAM OF KNEE 1 OR 2: CPT | Performed by: RADIOLOGY

## 2023-04-17 PROCEDURE — 73502 X-RAY EXAM HIP UNI 2-3 VIEWS: CPT

## 2023-04-17 PROCEDURE — 73560 X-RAY EXAM OF KNEE 1 OR 2: CPT

## 2023-08-18 ENCOUNTER — TRANSCRIBE ORDERS (OUTPATIENT)
Dept: ADMINISTRATIVE | Facility: HOSPITAL | Age: 73
End: 2023-08-18
Payer: COMMERCIAL

## 2023-08-18 DIAGNOSIS — Z12.31 VISIT FOR SCREENING MAMMOGRAM: Primary | ICD-10-CM

## 2023-08-28 ENCOUNTER — HOSPITAL ENCOUNTER (OUTPATIENT)
Dept: MAMMOGRAPHY | Facility: HOSPITAL | Age: 73
Discharge: HOME | End: 2023-08-28
Admitting: FAMILY MEDICINE
Payer: COMMERCIAL

## 2023-08-28 DIAGNOSIS — Z12.31 VISIT FOR SCREENING MAMMOGRAM: ICD-10-CM

## 2023-08-28 PROCEDURE — 77067 SCR MAMMO BI INCL CAD: CPT

## 2023-08-28 PROCEDURE — 77063 BREAST TOMOSYNTHESIS BI: CPT | Performed by: RADIOLOGY

## 2023-08-28 PROCEDURE — 77067 SCR MAMMO BI INCL CAD: CPT | Performed by: RADIOLOGY

## 2023-08-28 PROCEDURE — 77063 BREAST TOMOSYNTHESIS BI: CPT

## 2023-10-04 ENCOUNTER — HOSPITAL ENCOUNTER (OUTPATIENT)
Dept: ULTRASOUND IMAGING | Facility: HOSPITAL | Age: 73
Discharge: HOME | End: 2023-10-04
Payer: COMMERCIAL

## 2023-10-04 ENCOUNTER — TRANSCRIBE ORDERS (OUTPATIENT)
Dept: LAB | Facility: HOSPITAL | Age: 73
End: 2023-10-04
Payer: COMMERCIAL

## 2023-10-04 ENCOUNTER — HOSPITAL ENCOUNTER (OUTPATIENT)
Dept: GENERAL RADIOLOGY | Facility: HOSPITAL | Age: 73
Discharge: HOME | End: 2023-10-04
Payer: COMMERCIAL

## 2023-10-04 DIAGNOSIS — S80.02XA CONTUSION OF LEFT KNEE, INITIAL ENCOUNTER: ICD-10-CM

## 2023-10-04 DIAGNOSIS — S80.02XD CONTUSION OF LEFT KNEE, SUBSEQUENT ENCOUNTER: Primary | ICD-10-CM

## 2023-10-04 DIAGNOSIS — S80.02XD CONTUSION OF LEFT KNEE, SUBSEQUENT ENCOUNTER: ICD-10-CM

## 2023-10-04 PROCEDURE — 76882 US LMTD JT/FCL EVL NVASC XTR: CPT

## 2023-10-04 PROCEDURE — 73564 X-RAY EXAM KNEE 4 OR MORE: CPT

## 2023-10-18 ENCOUNTER — TRANSCRIBE ORDERS (OUTPATIENT)
Dept: LAB | Facility: HOSPITAL | Age: 73
End: 2023-10-18
Payer: COMMERCIAL

## 2023-10-18 DIAGNOSIS — R60.0 LOCALIZED EDEMA: Primary | ICD-10-CM

## 2023-11-02 ENCOUNTER — HOSPITAL ENCOUNTER (OUTPATIENT)
Dept: GENERAL RADIOLOGY | Facility: HOSPITAL | Age: 73
Discharge: HOME | End: 2023-11-02
Payer: COMMERCIAL

## 2023-11-02 ENCOUNTER — HOSPITAL ENCOUNTER (OUTPATIENT)
Dept: ULTRASOUND IMAGING | Facility: HOSPITAL | Age: 73
Discharge: HOME | End: 2023-11-02
Payer: COMMERCIAL

## 2023-11-02 ENCOUNTER — TRANSCRIBE ORDERS (OUTPATIENT)
Dept: LAB | Facility: HOSPITAL | Age: 73
End: 2023-11-02
Payer: COMMERCIAL

## 2023-11-02 DIAGNOSIS — M25.521 RIGHT ELBOW PAIN: ICD-10-CM

## 2023-11-02 DIAGNOSIS — M25.521 RIGHT ELBOW PAIN: Primary | ICD-10-CM

## 2023-11-02 DIAGNOSIS — R60.0 LOCALIZED EDEMA: ICD-10-CM

## 2023-11-02 PROCEDURE — 93971 EXTREMITY STUDY: CPT

## 2023-11-02 PROCEDURE — 73080 X-RAY EXAM OF ELBOW: CPT | Performed by: RADIOLOGY

## 2023-11-02 PROCEDURE — 73080 X-RAY EXAM OF ELBOW: CPT

## 2024-04-02 ENCOUNTER — HOSPITAL ENCOUNTER (OUTPATIENT)
Dept: GENERAL RADIOLOGY | Facility: HOSPITAL | Age: 74
Discharge: HOME | End: 2024-04-02
Admitting: FAMILY MEDICINE
Payer: COMMERCIAL

## 2024-04-02 ENCOUNTER — TRANSCRIBE ORDERS (OUTPATIENT)
Dept: LAB | Facility: HOSPITAL | Age: 74
End: 2024-04-02
Payer: COMMERCIAL

## 2024-04-02 DIAGNOSIS — J40 BRONCHITIS: Primary | ICD-10-CM

## 2024-04-02 DIAGNOSIS — J40 BRONCHITIS: ICD-10-CM

## 2024-04-02 PROCEDURE — 71046 X-RAY EXAM CHEST 2 VIEWS: CPT

## 2024-04-02 PROCEDURE — 71046 X-RAY EXAM CHEST 2 VIEWS: CPT | Performed by: RADIOLOGY

## 2024-05-13 ENCOUNTER — HOSPITAL ENCOUNTER (OUTPATIENT)
Dept: GENERAL RADIOLOGY | Facility: HOSPITAL | Age: 74
Discharge: HOME | End: 2024-05-13
Admitting: FAMILY MEDICINE
Payer: COMMERCIAL

## 2024-05-13 ENCOUNTER — TRANSCRIBE ORDERS (OUTPATIENT)
Dept: LAB | Facility: HOSPITAL | Age: 74
End: 2024-05-13
Payer: COMMERCIAL

## 2024-05-13 DIAGNOSIS — M17.12 ARTHRITIS OF LEFT KNEE: Primary | ICD-10-CM

## 2024-05-13 DIAGNOSIS — M17.12 ARTHRITIS OF LEFT KNEE: ICD-10-CM

## 2024-05-13 PROCEDURE — 73560 X-RAY EXAM OF KNEE 1 OR 2: CPT

## 2024-05-13 PROCEDURE — 73560 X-RAY EXAM OF KNEE 1 OR 2: CPT | Performed by: RADIOLOGY

## 2024-06-07 ENCOUNTER — TRANSCRIBE ORDERS (OUTPATIENT)
Dept: ADMINISTRATIVE | Facility: HOSPITAL | Age: 74
End: 2024-06-07
Payer: COMMERCIAL

## 2024-06-07 ENCOUNTER — HOSPITAL ENCOUNTER (OUTPATIENT)
Dept: GENERAL RADIOLOGY | Facility: HOSPITAL | Age: 74
Discharge: HOME | End: 2024-06-07
Payer: COMMERCIAL

## 2024-06-07 DIAGNOSIS — M17.0 PRIMARY OSTEOARTHRITIS OF BOTH KNEES: ICD-10-CM

## 2024-06-07 DIAGNOSIS — M17.0 PRIMARY OSTEOARTHRITIS OF BOTH KNEES: Primary | ICD-10-CM

## 2024-06-07 PROCEDURE — 73565 X-RAY EXAM OF KNEES: CPT

## 2024-07-27 ENCOUNTER — HOSPITAL ENCOUNTER (EMERGENCY)
Facility: HOSPITAL | Age: 74
Discharge: HOME | End: 2024-07-28
Attending: EMERGENCY MEDICINE | Admitting: EMERGENCY MEDICINE
Payer: COMMERCIAL

## 2024-07-27 ENCOUNTER — APPOINTMENT (OUTPATIENT)
Dept: GENERAL RADIOLOGY | Facility: HOSPITAL | Age: 74
End: 2024-07-27
Payer: COMMERCIAL

## 2024-07-27 DIAGNOSIS — J90 PLEURAL EFFUSION: Primary | ICD-10-CM

## 2024-07-27 LAB
ALBUMIN SERPL-MCNC: 4 G/DL (ref 3.5–5.2)
ALBUMIN/GLOB SERPL: 1.7 G/DL
ALP SERPL-CCNC: 133 U/L (ref 39–117)
ALT SERPL W P-5'-P-CCNC: 7 U/L (ref 1–33)
ANION GAP SERPL CALCULATED.3IONS-SCNC: 12.6 MMOL/L (ref 5–15)
ANISOCYTOSIS BLD QL: ABNORMAL
AST SERPL-CCNC: 15 U/L (ref 1–32)
BILIRUB SERPL-MCNC: 0.6 MG/DL (ref 0–1.2)
BUN SERPL-MCNC: 14 MG/DL (ref 8–23)
BUN/CREAT SERPL: 15.6 (ref 7–25)
CALCIUM SPEC-SCNC: 9.7 MG/DL (ref 8.6–10.5)
CHLORIDE SERPL-SCNC: 105 MMOL/L (ref 98–107)
CO2 SERPL-SCNC: 27.4 MMOL/L (ref 22–29)
CREAT SERPL-MCNC: 0.9 MG/DL (ref 0.57–1)
DEPRECATED RDW RBC AUTO: 54.4 FL (ref 37–54)
EGFRCR SERPLBLD CKD-EPI 2021: 68.5 ML/MIN/1.73
ERYTHROCYTE [DISTWIDTH] IN BLOOD BY AUTOMATED COUNT: 16.4 % (ref 12.3–15.4)
GLOBULIN UR ELPH-MCNC: 2.4 GM/DL
GLUCOSE SERPL-MCNC: 88 MG/DL (ref 65–99)
HCT VFR BLD AUTO: 39.2 % (ref 34–46.6)
HGB BLD-MCNC: 12.5 G/DL (ref 12–15.9)
HOLD SPECIMEN: NORMAL
HOLD SPECIMEN: NORMAL
LARGE PLATELETS: ABNORMAL
LYMPHOCYTES # BLD MANUAL: 1 10*3/MM3 (ref 0.7–3.1)
LYMPHOCYTES NFR BLD MANUAL: 2 % (ref 5–12)
MCH RBC QN AUTO: 28.8 PG (ref 26.6–33)
MCHC RBC AUTO-ENTMCNC: 31.9 G/DL (ref 31.5–35.7)
MCV RBC AUTO: 90.3 FL (ref 79–97)
MONOCYTES # BLD: 0.25 10*3/MM3 (ref 0.1–0.9)
NEUTROPHILS # BLD AUTO: 11.2 10*3/MM3 (ref 1.7–7)
NEUTROPHILS NFR BLD MANUAL: 85 % (ref 42.7–76)
NEUTS BAND NFR BLD MANUAL: 5 % (ref 0–5)
PLATELET # BLD AUTO: 162 10*3/MM3 (ref 140–450)
PMV BLD AUTO: 12.7 FL (ref 6–12)
POTASSIUM SERPL-SCNC: 3.6 MMOL/L (ref 3.5–5.2)
PROT SERPL-MCNC: 6.4 G/DL (ref 6–8.5)
RBC # BLD AUTO: 4.34 10*6/MM3 (ref 3.77–5.28)
SODIUM SERPL-SCNC: 145 MMOL/L (ref 136–145)
TROPONIN T SERPL HS-MCNC: 17 NG/L
VARIANT LYMPHS NFR BLD MANUAL: 8 % (ref 19.6–45.3)
WBC NRBC COR # BLD AUTO: 12.44 10*3/MM3 (ref 3.4–10.8)
WHOLE BLOOD HOLD COAG: NORMAL
WHOLE BLOOD HOLD SPECIMEN: NORMAL

## 2024-07-27 PROCEDURE — 84145 PROCALCITONIN (PCT): CPT | Performed by: EMERGENCY MEDICINE

## 2024-07-27 PROCEDURE — 93005 ELECTROCARDIOGRAM TRACING: CPT | Performed by: EMERGENCY MEDICINE

## 2024-07-27 PROCEDURE — 85025 COMPLETE CBC W/AUTO DIFF WBC: CPT | Performed by: EMERGENCY MEDICINE

## 2024-07-27 PROCEDURE — 85007 BL SMEAR W/DIFF WBC COUNT: CPT | Performed by: EMERGENCY MEDICINE

## 2024-07-27 PROCEDURE — 99285 EMERGENCY DEPT VISIT HI MDM: CPT

## 2024-07-27 PROCEDURE — 36415 COLL VENOUS BLD VENIPUNCTURE: CPT

## 2024-07-27 PROCEDURE — 93010 ELECTROCARDIOGRAM REPORT: CPT | Performed by: INTERNAL MEDICINE

## 2024-07-27 PROCEDURE — 85652 RBC SED RATE AUTOMATED: CPT | Performed by: EMERGENCY MEDICINE

## 2024-07-27 PROCEDURE — 80053 COMPREHEN METABOLIC PANEL: CPT | Performed by: EMERGENCY MEDICINE

## 2024-07-27 PROCEDURE — 71045 X-RAY EXAM CHEST 1 VIEW: CPT

## 2024-07-27 PROCEDURE — 71045 X-RAY EXAM CHEST 1 VIEW: CPT | Performed by: RADIOLOGY

## 2024-07-27 PROCEDURE — 84484 ASSAY OF TROPONIN QUANT: CPT | Performed by: EMERGENCY MEDICINE

## 2024-07-27 RX ORDER — SODIUM CHLORIDE 0.9 % (FLUSH) 0.9 %
10 SYRINGE (ML) INJECTION AS NEEDED
Status: DISCONTINUED | OUTPATIENT
Start: 2024-07-27 | End: 2024-07-28 | Stop reason: HOSPADM

## 2024-07-27 RX ORDER — ASPIRIN 81 MG/1
324 TABLET, CHEWABLE ORAL ONCE
Status: COMPLETED | OUTPATIENT
Start: 2024-07-27 | End: 2024-07-27

## 2024-07-27 RX ADMIN — ASPIRIN 162 MG: 81 TABLET, CHEWABLE ORAL at 23:18

## 2024-07-28 ENCOUNTER — APPOINTMENT (OUTPATIENT)
Dept: CT IMAGING | Facility: HOSPITAL | Age: 74
End: 2024-07-28
Payer: COMMERCIAL

## 2024-07-28 VITALS
WEIGHT: 185 LBS | DIASTOLIC BLOOD PRESSURE: 60 MMHG | OXYGEN SATURATION: 97 % | TEMPERATURE: 98.4 F | BODY MASS INDEX: 28.04 KG/M2 | HEIGHT: 68 IN | SYSTOLIC BLOOD PRESSURE: 110 MMHG | HEART RATE: 100 BPM | RESPIRATION RATE: 20 BRPM

## 2024-07-28 LAB
BACTERIA UR QL AUTO: ABNORMAL /HPF
BILIRUB UR QL STRIP: NEGATIVE
CLARITY UR: CLEAR
COLOR UR: YELLOW
CRP SERPL-MCNC: 0.58 MG/DL (ref 0–0.5)
D-LACTATE SERPL-SCNC: 1.6 MMOL/L (ref 0.5–2)
ERYTHROCYTE [SEDIMENTATION RATE] IN BLOOD: 22 MM/HR (ref 0–30)
GEN 5 2HR TROPONIN T REFLEX: 17 NG/L
GLUCOSE UR STRIP-MCNC: NEGATIVE MG/DL
HGB UR QL STRIP.AUTO: NEGATIVE
HYALINE CASTS UR QL AUTO: ABNORMAL /LPF
KETONES UR QL STRIP: NEGATIVE
LEUKOCYTE ESTERASE UR QL STRIP.AUTO: ABNORMAL
MAGNESIUM SERPL-MCNC: 1.6 MG/DL (ref 1.6–2.4)
NITRITE UR QL STRIP: NEGATIVE
NT-PROBNP SERPL-MCNC: 1992 PG/ML (ref 0–900)
PH UR STRIP.AUTO: 6.5 [PH] (ref 5–8)
PROCALCITONIN SERPL-MCNC: 0.04 NG/ML (ref 0–0.25)
PROT UR QL STRIP: NEGATIVE
QT INTERVAL: 298 MS
QTC INTERVAL: 443 MS
RBC # UR STRIP: ABNORMAL /HPF
REF LAB TEST METHOD: ABNORMAL
SP GR UR STRIP: 1.01 (ref 1–1.03)
SQUAMOUS #/AREA URNS HPF: ABNORMAL /HPF
T4 FREE SERPL-MCNC: 1.11 NG/DL (ref 0.93–1.7)
TROPONIN T DELTA: 0 NG/L
TSH SERPL DL<=0.05 MIU/L-ACNC: 3.06 UIU/ML (ref 0.27–4.2)
UROBILINOGEN UR QL STRIP: ABNORMAL
WBC # UR STRIP: ABNORMAL /HPF

## 2024-07-28 PROCEDURE — 87040 BLOOD CULTURE FOR BACTERIA: CPT | Performed by: EMERGENCY MEDICINE

## 2024-07-28 PROCEDURE — 25010000002 CEFTRIAXONE PER 250 MG: Performed by: EMERGENCY MEDICINE

## 2024-07-28 PROCEDURE — 84443 ASSAY THYROID STIM HORMONE: CPT | Performed by: EMERGENCY MEDICINE

## 2024-07-28 PROCEDURE — 71275 CT ANGIOGRAPHY CHEST: CPT | Performed by: RADIOLOGY

## 2024-07-28 PROCEDURE — 83735 ASSAY OF MAGNESIUM: CPT | Performed by: EMERGENCY MEDICINE

## 2024-07-28 PROCEDURE — 83880 ASSAY OF NATRIURETIC PEPTIDE: CPT | Performed by: EMERGENCY MEDICINE

## 2024-07-28 PROCEDURE — 74176 CT ABD & PELVIS W/O CONTRAST: CPT

## 2024-07-28 PROCEDURE — 74176 CT ABD & PELVIS W/O CONTRAST: CPT | Performed by: RADIOLOGY

## 2024-07-28 PROCEDURE — 25510000001 IOPAMIDOL PER 1 ML: Performed by: STUDENT IN AN ORGANIZED HEALTH CARE EDUCATION/TRAINING PROGRAM

## 2024-07-28 PROCEDURE — 71275 CT ANGIOGRAPHY CHEST: CPT

## 2024-07-28 PROCEDURE — 86140 C-REACTIVE PROTEIN: CPT | Performed by: EMERGENCY MEDICINE

## 2024-07-28 PROCEDURE — 84439 ASSAY OF FREE THYROXINE: CPT | Performed by: EMERGENCY MEDICINE

## 2024-07-28 PROCEDURE — 96365 THER/PROPH/DIAG IV INF INIT: CPT

## 2024-07-28 PROCEDURE — 81001 URINALYSIS AUTO W/SCOPE: CPT | Performed by: EMERGENCY MEDICINE

## 2024-07-28 PROCEDURE — 83605 ASSAY OF LACTIC ACID: CPT | Performed by: EMERGENCY MEDICINE

## 2024-07-28 PROCEDURE — 87086 URINE CULTURE/COLONY COUNT: CPT | Performed by: EMERGENCY MEDICINE

## 2024-07-28 PROCEDURE — 84484 ASSAY OF TROPONIN QUANT: CPT | Performed by: EMERGENCY MEDICINE

## 2024-07-28 RX ADMIN — CEFTRIAXONE 1000 MG: 1 INJECTION, POWDER, FOR SOLUTION INTRAMUSCULAR; INTRAVENOUS at 02:18

## 2024-07-28 RX ADMIN — IOPAMIDOL 70 ML: 755 INJECTION, SOLUTION INTRAVENOUS at 02:40

## 2024-07-28 RX ADMIN — SODIUM CHLORIDE 500 ML: 9 INJECTION, SOLUTION INTRAVENOUS at 02:16

## 2024-07-28 NOTE — ED PROVIDER NOTES
Subjective     History provided by:  Patient   used: No    Chest Pain  Pain location:  Substernal area  Pain quality: tightness    Pain radiates to:  Does not radiate  Pain severity:  Mild  Onset quality:  Gradual  Duration:  3 hours  Timing:  Constant  Progression:  Waxing and waning  Chronicity:  New  Context: breathing    Context: not drug use, not eating, not intercourse, not lifting, not movement, not raising an arm, not at rest, not stress and not trauma    Relieved by:  Nothing  Worsened by:  Nothing  Ineffective treatments:  None tried  Associated symptoms: no abdominal pain, no AICD problem, no altered mental status, no anorexia, no anxiety, no back pain, no claudication, no cough, no diaphoresis, no dizziness, no dysphagia, no fatigue, no fever, no headache, no heartburn, no lower extremity edema, no nausea, no near-syncope, no numbness, no orthopnea, no palpitations, no PND, no shortness of breath, no syncope, no vomiting and no weakness    Risk factors: high cholesterol and hypertension    Risk factors: no aortic disease, no birth control, no coronary artery disease, no diabetes mellitus, no Ghulam-Danlos syndrome, no immobilization, not male, no Marfan's syndrome, not obese, not pregnant, no prior DVT/PE, no smoking and no surgery        Review of Systems   Constitutional:  Negative for activity change, appetite change, chills, diaphoresis, fatigue and fever.   HENT:  Negative for congestion, ear pain, sore throat and trouble swallowing.    Eyes:  Negative for redness.   Respiratory:  Negative for cough, chest tightness, shortness of breath and wheezing.    Cardiovascular:  Positive for chest pain. Negative for palpitations, orthopnea, claudication, leg swelling, syncope, PND and near-syncope.   Gastrointestinal:  Negative for abdominal pain, anorexia, diarrhea, heartburn, nausea and vomiting.   Genitourinary:  Negative for dysuria and urgency.   Musculoskeletal:  Negative for  arthralgias, back pain, myalgias and neck pain.   Skin:  Negative for pallor, rash and wound.   Neurological:  Negative for dizziness, speech difficulty, weakness, numbness and headaches.   Psychiatric/Behavioral:  Negative for agitation, behavioral problems, confusion and decreased concentration.    All other systems reviewed and are negative.      Past Medical History:   Diagnosis Date    Arthritis     Chronic bronchitis     Coronary artery disease     Diastolic CHF, chronic 11/05/2020    Elevated cholesterol     GERD (gastroesophageal reflux disease)     History of CVA (cerebrovascular accident)     right sided; 2005    History of CVA (cerebrovascular accident) 11/5/2020    History of gastric ulcer     History of gastric ulcer 11/5/2020    History of transfusion     Hyperlipidemia     Hypertension     Melanoma of skin     Myocardial infarction     15 years ago    Normocytic anemia     Stroke 11/13/2022       Allergies   Allergen Reactions    Sulfa Antibiotics Anaphylaxis       Past Surgical History:   Procedure Laterality Date    ABDOMINAL SURGERY      BRAVO PROCEDURE N/A 9/16/2019    Procedure: ESOPHAGOGASTRODUODENOSCOPY AND FRIEDMAN;  Surgeon: Neil Balderrama MD;  [facility]: [facility];  Service: Gastroenterology    CARDIAC CATHETERIZATION      CARDIAC CATHETERIZATION N/A 12/17/2018    Procedure: Left Heart Cath;  Surgeon: Sandip Zamora IV, MD;  Location: [facility] CATH INVASIVE LOCATION;  Service: Cardiovascular    CHOLECYSTECTOMY      laparoscopic    COLONOSCOPY  2014    ENDOSCOPY      ENDOSCOPY N/A 11/11/2019    Procedure: ESOPHAGOGASTRODUODENOSCOPY WITH  DILATATION WITH FLUOROSCOPY;  Surgeon: Neil Balderrama MD;  Location: [facility] OR;  Service: Gastroenterology    FRACTURE SURGERY      HYSTERECTOMY      benign    OTHER SURGICAL HISTORY      leg repair    SKIN BIOPSY Left     shoulder    STOMACH SURGERY      TRUNK LESION/CYST EXCISION Left 1/13/2021    Procedure: Skin lesion excision left shoulder;   Surgeon: Neil Balderrama MD;  Location: [facility];  Service: General;  Laterality: Left;       Family History   Problem Relation Age of Onset    Diabetes Other     Hypertension Other     Ovarian cancer Other     Cancer Mother         bladder    Cancer Sister         liver    Diabetes Sister     Diabetes Brother     No Known Problems Father     Breast cancer Neg Hx        Social History     Socioeconomic History    Marital status:    Tobacco Use    Smoking status: Never    Smokeless tobacco: Never   Vaping Use    Vaping status: Never Used   Substance and Sexual Activity    Alcohol use: No    Drug use: No    Sexual activity: Defer           Objective   Physical Exam  Vitals and nursing note reviewed.   Constitutional:       General: She is not in acute distress.     Appearance: Normal appearance. She is well-developed. She is not toxic-appearing or diaphoretic.   HENT:      Head: Normocephalic and atraumatic.      Right Ear: External ear normal.      Left Ear: External ear normal.      Nose: Nose normal.      Mouth/Throat:      Pharynx: No oropharyngeal exudate.      Tonsils: No tonsillar exudate.   Eyes:      General: Lids are normal.      Conjunctiva/sclera: Conjunctivae normal.      Pupils: Pupils are equal, round, and reactive to light.   Neck:      Thyroid: No thyromegaly.   Cardiovascular:      Rate and Rhythm: Regular rhythm. Tachycardia present.      Pulses: Normal pulses.      Heart sounds: Normal heart sounds, S1 normal and S2 normal.   Pulmonary:      Effort: Pulmonary effort is normal. No tachypnea or respiratory distress.      Breath sounds: Normal breath sounds. No decreased breath sounds, wheezing, rhonchi or rales.   Chest:      Chest wall: No tenderness.   Abdominal:      General: Bowel sounds are normal. There is no distension.      Palpations: Abdomen is soft.      Tenderness: There is no abdominal tenderness. There is no guarding or rebound.   Musculoskeletal:         General: No  tenderness or deformity. Normal range of motion.      Cervical back: Full passive range of motion without pain, normal range of motion and neck supple.   Lymphadenopathy:      Cervical: No cervical adenopathy.   Skin:     General: Skin is warm and dry.      Coloration: Skin is not pale.      Findings: No erythema or rash.   Neurological:      Mental Status: She is alert and oriented to person, place, and time.      GCS: GCS eye subscore is 4. GCS verbal subscore is 5. GCS motor subscore is 6.      Cranial Nerves: No cranial nerve deficit.      Sensory: No sensory deficit.   Psychiatric:         Speech: Speech normal.         Behavior: Behavior normal.         Thought Content: Thought content normal.         Judgment: Judgment normal.         Procedures           ED Course  ED Course as of 07/28/24 1447   Sat Jul 27, 2024   2326 ECG 12 Lead ED Triage Standing Order; Chest Pain  Vent. Rate : 133 BPM     Atrial Rate : 133 BPM     P-R Int : 142 ms          QRS Dur :  86 ms      QT Int : 298 ms       P-R-T Axes :  83 -16  78 degrees     QTc Int : 443 ms     Sinus tachycardia with premature supraventricular complexes  Septal infarct , age undetermined  ST & T wave abnormality, consider lateral ischemia  Abnormal ECG  When compared with ECG of 05-NOV-2020 11:32,  premature supraventricular complexes are now present  Vent. rate has increased BY  70 BPM  Septal infarct is now present  Nonspecific T wave abnormality no longer evident in Inferior leads   [ES]   Sun Jul 28, 2024   0132 XR Chest 1 View  IMPRESSION:     1.  Mild hypoinflated lungs  2.  Slightly elevated left hemidiaphragm.  3.  No lobar consolidation or edema.  4.  No pleural effusion or pneumothorax.   [ES]   0529 CT and abdomen pelvis largely unremarkable, patient has pleural effusions but does not have clinical signs of hypervolemia, she has an elevated BNP, however she is also tachycardic.  Her heart rate does come down with fluids, I anticipate that she will  need follow-up primary care for a possible echocardiogram to evaluate for heart function.  Counseled her on need for echocardiogram before deciding on Lasix as the etiology of pleural effusions is unclear to me. [AK]      ED Course User Index  [AK] Saleem Busch MD  [ES] Pascual Mayer MD                                             Medical Decision Making  Problems Addressed:  Pleural effusion: complicated acute illness or injury    Amount and/or Complexity of Data Reviewed  Labs: ordered.  Radiology: ordered. Decision-making details documented in ED Course.  ECG/medicine tests: ordered. Decision-making details documented in ED Course.    Risk  OTC drugs.  Prescription drug management.        Final diagnoses:   Pleural effusion       ED Disposition  ED Disposition       ED Disposition   Discharge    Condition   Stable    Comment   --               Joyce Braden MD  [address]  [place] [address]  [phone]    In 2 days      [facility] EMERGENCY DEPARTMENT  [address] [place] [id number] [phone]  Go to   If symptoms worsen         Medication List      No changes were made to your prescriptions during this visit.            Pascual Mayer MD  07/28/24 5225

## 2024-07-29 LAB — BACTERIA SPEC AEROBE CULT: NO GROWTH

## 2024-08-02 LAB
BACTERIA SPEC AEROBE CULT: NORMAL
BACTERIA SPEC AEROBE CULT: NORMAL

## 2025-03-24 ENCOUNTER — TRANSCRIBE ORDERS (OUTPATIENT)
Dept: ADMINISTRATIVE | Facility: HOSPITAL | Age: 75
End: 2025-03-24
Payer: COMMERCIAL

## 2025-03-24 DIAGNOSIS — Z12.31 VISIT FOR SCREENING MAMMOGRAM: Primary | ICD-10-CM

## 2025-05-01 ENCOUNTER — HOSPITAL ENCOUNTER (OUTPATIENT)
Dept: GENERAL RADIOLOGY | Facility: HOSPITAL | Age: 75
Discharge: HOME | End: 2025-05-01
Admitting: ORTHOPAEDIC SURGERY
Payer: COMMERCIAL

## 2025-05-01 ENCOUNTER — TRANSCRIBE ORDERS (OUTPATIENT)
Dept: ADMINISTRATIVE | Facility: HOSPITAL | Age: 75
End: 2025-05-01
Payer: COMMERCIAL

## 2025-05-01 DIAGNOSIS — M25.561 RIGHT KNEE PAIN, UNSPECIFIED CHRONICITY: Primary | ICD-10-CM

## 2025-05-01 DIAGNOSIS — M25.561 RIGHT KNEE PAIN, UNSPECIFIED CHRONICITY: ICD-10-CM

## 2025-05-01 PROCEDURE — 73562 X-RAY EXAM OF KNEE 3: CPT | Performed by: RADIOLOGY

## 2025-05-01 PROCEDURE — 73562 X-RAY EXAM OF KNEE 3: CPT

## 2025-06-26 ENCOUNTER — HOSPITAL ENCOUNTER (OUTPATIENT)
Facility: HOSPITAL | Age: 75
Discharge: HOME | End: 2025-06-26
Admitting: FAMILY MEDICINE
Payer: COMMERCIAL

## 2025-06-26 DIAGNOSIS — Z12.31 VISIT FOR SCREENING MAMMOGRAM: ICD-10-CM

## 2025-06-26 PROCEDURE — 77063 BREAST TOMOSYNTHESIS BI: CPT

## 2025-06-26 PROCEDURE — 77067 SCR MAMMO BI INCL CAD: CPT

## 2025-08-07 ENCOUNTER — ANESTHESIA EVENT (OUTPATIENT)
Dept: PERIOP | Facility: HOSPITAL | Age: 75
End: 2025-08-07
Payer: MEDICARE

## 2025-08-07 ENCOUNTER — HOSPITAL ENCOUNTER (EMERGENCY)
Facility: HOSPITAL | Age: 75
Discharge: HOME | End: 2025-08-07
Attending: STUDENT IN AN ORGANIZED HEALTH CARE EDUCATION/TRAINING PROGRAM
Payer: MEDICARE

## 2025-08-07 ENCOUNTER — APPOINTMENT (OUTPATIENT)
Dept: GENERAL RADIOLOGY | Facility: HOSPITAL | Age: 75
End: 2025-08-07
Payer: MEDICARE

## 2025-08-07 ENCOUNTER — ANESTHESIA (OUTPATIENT)
Dept: PERIOP | Facility: HOSPITAL | Age: 75
End: 2025-08-07
Payer: MEDICARE

## 2025-08-07 ENCOUNTER — APPOINTMENT (OUTPATIENT)
Dept: CT IMAGING | Facility: HOSPITAL | Age: 75
End: 2025-08-07
Payer: MEDICARE

## 2025-08-07 VITALS
BODY MASS INDEX: 27.89 KG/M2 | SYSTOLIC BLOOD PRESSURE: 125 MMHG | HEART RATE: 92 BPM | OXYGEN SATURATION: 97 % | WEIGHT: 184 LBS | HEIGHT: 68 IN | TEMPERATURE: 97.8 F | DIASTOLIC BLOOD PRESSURE: 83 MMHG | RESPIRATION RATE: 16 BRPM

## 2025-08-07 DIAGNOSIS — K22.2 ESOPHAGEAL OBSTRUCTION: Primary | ICD-10-CM

## 2025-08-07 LAB
ALBUMIN SERPL-MCNC: 4.3 G/DL (ref 3.5–5.2)
ALBUMIN/GLOB SERPL: 1.8 G/DL
ALP SERPL-CCNC: 115 U/L (ref 39–117)
ALT SERPL W P-5'-P-CCNC: <5 U/L (ref 1–33)
ANION GAP SERPL CALCULATED.3IONS-SCNC: 17.7 MMOL/L (ref 5–15)
ANISOCYTOSIS BLD QL: ABNORMAL
AST SERPL-CCNC: 13 U/L (ref 1–32)
BILIRUB SERPL-MCNC: 1.2 MG/DL (ref 0–1.2)
BUN SERPL-MCNC: 21.8 MG/DL (ref 8–23)
BUN/CREAT SERPL: 29.1 (ref 7–25)
CALCIUM SPEC-SCNC: 9.7 MG/DL (ref 8.6–10.5)
CHLORIDE SERPL-SCNC: 102 MMOL/L (ref 98–107)
CO2 SERPL-SCNC: 22.3 MMOL/L (ref 22–29)
CREAT SERPL-MCNC: 0.75 MG/DL (ref 0.57–1)
DEPRECATED RDW RBC AUTO: 51.8 FL (ref 37–54)
EGFRCR SERPLBLD CKD-EPI 2021: 84.7 ML/MIN/1.73
ERYTHROCYTE [DISTWIDTH] IN BLOOD BY AUTOMATED COUNT: 19.4 % (ref 12.3–15.4)
GLOBULIN UR ELPH-MCNC: 2.4 GM/DL
GLUCOSE SERPL-MCNC: 87 MG/DL (ref 65–99)
HCT VFR BLD AUTO: 41.5 % (ref 34–46.6)
HGB BLD-MCNC: 12 G/DL (ref 12–15.9)
HYPOCHROMIA BLD QL: ABNORMAL
LARGE PLATELETS: ABNORMAL
LIPASE SERPL-CCNC: 21 U/L (ref 13–60)
LYMPHOCYTES # BLD MANUAL: 0.61 10*3/MM3 (ref 0.7–3.1)
LYMPHOCYTES NFR BLD MANUAL: 1 % (ref 5–12)
MCH RBC QN AUTO: 21.9 PG (ref 26.6–33)
MCHC RBC AUTO-ENTMCNC: 28.9 G/DL (ref 31.5–35.7)
MCV RBC AUTO: 75.7 FL (ref 79–97)
METAMYELOCYTES NFR BLD MANUAL: 1 % (ref 0–0)
MICROCYTES BLD QL: ABNORMAL
MONOCYTES # BLD: 0.1 10*3/MM3 (ref 0.1–0.9)
NEUTROPHILS # BLD AUTO: 9.37 10*3/MM3 (ref 1.7–7)
NEUTROPHILS NFR BLD MANUAL: 92 % (ref 42.7–76)
PLATELET # BLD AUTO: 269 10*3/MM3 (ref 140–450)
PMV BLD AUTO: 11.6 FL (ref 6–12)
POTASSIUM SERPL-SCNC: 3.3 MMOL/L (ref 3.5–5.2)
PROT SERPL-MCNC: 6.7 G/DL (ref 6–8.5)
RBC # BLD AUTO: 5.48 10*6/MM3 (ref 3.77–5.28)
SCAN SLIDE: NORMAL
SODIUM SERPL-SCNC: 142 MMOL/L (ref 136–145)
VARIANT LYMPHS NFR BLD MANUAL: 6 % (ref 19.6–45.3)
WBC NRBC COR # BLD AUTO: 10.19 10*3/MM3 (ref 3.4–10.8)

## 2025-08-07 PROCEDURE — 83690 ASSAY OF LIPASE: CPT | Performed by: NURSE PRACTITIONER

## 2025-08-07 PROCEDURE — 99284 EMERGENCY DEPT VISIT MOD MDM: CPT | Performed by: SURGERY

## 2025-08-07 PROCEDURE — 80053 COMPREHEN METABOLIC PANEL: CPT | Performed by: NURSE PRACTITIONER

## 2025-08-07 PROCEDURE — 74176 CT ABD & PELVIS W/O CONTRAST: CPT | Performed by: RADIOLOGY

## 2025-08-07 PROCEDURE — 71045 X-RAY EXAM CHEST 1 VIEW: CPT

## 2025-08-07 PROCEDURE — 99285 EMERGENCY DEPT VISIT HI MDM: CPT

## 2025-08-07 PROCEDURE — C1726 CATH, BAL DIL, NON-VASCULAR: HCPCS | Performed by: SURGERY

## 2025-08-07 PROCEDURE — 85025 COMPLETE CBC W/AUTO DIFF WBC: CPT | Performed by: NURSE PRACTITIONER

## 2025-08-07 PROCEDURE — 96360 HYDRATION IV INFUSION INIT: CPT

## 2025-08-07 PROCEDURE — 25010000002 PROPOFOL 10 MG/ML EMULSION: Performed by: NURSE ANESTHETIST, CERTIFIED REGISTERED

## 2025-08-07 PROCEDURE — 25810000003 LACTATED RINGERS PER 1000 ML: Performed by: ANESTHESIOLOGY

## 2025-08-07 PROCEDURE — 25810000003 SODIUM CHLORIDE 0.9 % SOLUTION: Performed by: NURSE PRACTITIONER

## 2025-08-07 PROCEDURE — 71045 X-RAY EXAM CHEST 1 VIEW: CPT | Performed by: RADIOLOGY

## 2025-08-07 PROCEDURE — 25010000002 LIDOCAINE PF 2% 2 % SOLUTION: Performed by: NURSE ANESTHETIST, CERTIFIED REGISTERED

## 2025-08-07 PROCEDURE — 85007 BL SMEAR W/DIFF WBC COUNT: CPT | Performed by: NURSE PRACTITIONER

## 2025-08-07 PROCEDURE — 74176 CT ABD & PELVIS W/O CONTRAST: CPT

## 2025-08-07 PROCEDURE — 36415 COLL VENOUS BLD VENIPUNCTURE: CPT

## 2025-08-07 PROCEDURE — 25010000002 METOPROLOL TARTRATE 5 MG/5ML SOLUTION: Performed by: NURSE ANESTHETIST, CERTIFIED REGISTERED

## 2025-08-07 RX ORDER — MIDAZOLAM HYDROCHLORIDE 1 MG/ML
0.5 INJECTION, SOLUTION INTRAMUSCULAR; INTRAVENOUS
Status: DISCONTINUED | OUTPATIENT
Start: 2025-08-07 | End: 2025-08-07 | Stop reason: HOSPADM

## 2025-08-07 RX ORDER — MEPERIDINE HYDROCHLORIDE 25 MG/ML
12.5 INJECTION INTRAMUSCULAR; INTRAVENOUS; SUBCUTANEOUS
Status: DISCONTINUED | OUTPATIENT
Start: 2025-08-07 | End: 2025-08-07 | Stop reason: HOSPADM

## 2025-08-07 RX ORDER — SODIUM CHLORIDE 9 MG/ML
40 INJECTION, SOLUTION INTRAVENOUS AS NEEDED
Status: DISCONTINUED | OUTPATIENT
Start: 2025-08-07 | End: 2025-08-07 | Stop reason: HOSPADM

## 2025-08-07 RX ORDER — SODIUM CHLORIDE 0.9 % (FLUSH) 0.9 %
10 SYRINGE (ML) INJECTION EVERY 12 HOURS SCHEDULED
Status: DISCONTINUED | OUTPATIENT
Start: 2025-08-07 | End: 2025-08-07 | Stop reason: HOSPADM

## 2025-08-07 RX ORDER — IPRATROPIUM BROMIDE AND ALBUTEROL SULFATE 2.5; .5 MG/3ML; MG/3ML
3 SOLUTION RESPIRATORY (INHALATION) ONCE AS NEEDED
Status: DISCONTINUED | OUTPATIENT
Start: 2025-08-07 | End: 2025-08-07 | Stop reason: HOSPADM

## 2025-08-07 RX ORDER — OXYCODONE AND ACETAMINOPHEN 5; 325 MG/1; MG/1
1 TABLET ORAL ONCE AS NEEDED
Status: DISCONTINUED | OUTPATIENT
Start: 2025-08-07 | End: 2025-08-07 | Stop reason: HOSPADM

## 2025-08-07 RX ORDER — FENTANYL CITRATE 50 UG/ML
50 INJECTION, SOLUTION INTRAMUSCULAR; INTRAVENOUS
Status: DISCONTINUED | OUTPATIENT
Start: 2025-08-07 | End: 2025-08-07 | Stop reason: HOSPADM

## 2025-08-07 RX ORDER — ONDANSETRON 2 MG/ML
4 INJECTION INTRAMUSCULAR; INTRAVENOUS AS NEEDED
Status: DISCONTINUED | OUTPATIENT
Start: 2025-08-07 | End: 2025-08-07 | Stop reason: HOSPADM

## 2025-08-07 RX ORDER — METOPROLOL TARTRATE 1 MG/ML
INJECTION, SOLUTION INTRAVENOUS AS NEEDED
Status: DISCONTINUED | OUTPATIENT
Start: 2025-08-07 | End: 2025-08-07 | Stop reason: SURG

## 2025-08-07 RX ORDER — SODIUM CHLORIDE, SODIUM LACTATE, POTASSIUM CHLORIDE, CALCIUM CHLORIDE 600; 310; 30; 20 MG/100ML; MG/100ML; MG/100ML; MG/100ML
100 INJECTION, SOLUTION INTRAVENOUS ONCE AS NEEDED
Status: DISCONTINUED | OUTPATIENT
Start: 2025-08-07 | End: 2025-08-07 | Stop reason: HOSPADM

## 2025-08-07 RX ORDER — LIDOCAINE HYDROCHLORIDE 20 MG/ML
INJECTION, SOLUTION EPIDURAL; INFILTRATION; INTRACAUDAL; PERINEURAL AS NEEDED
Status: DISCONTINUED | OUTPATIENT
Start: 2025-08-07 | End: 2025-08-07 | Stop reason: SURG

## 2025-08-07 RX ORDER — SODIUM CHLORIDE 0.9 % (FLUSH) 0.9 %
10 SYRINGE (ML) INJECTION AS NEEDED
Status: DISCONTINUED | OUTPATIENT
Start: 2025-08-07 | End: 2025-08-07 | Stop reason: HOSPADM

## 2025-08-07 RX ORDER — SODIUM CHLORIDE, SODIUM LACTATE, POTASSIUM CHLORIDE, CALCIUM CHLORIDE 600; 310; 30; 20 MG/100ML; MG/100ML; MG/100ML; MG/100ML
125 INJECTION, SOLUTION INTRAVENOUS ONCE
Status: COMPLETED | OUTPATIENT
Start: 2025-08-07 | End: 2025-08-07

## 2025-08-07 RX ADMIN — METOPROLOL TARTRATE 2.5 MG: 1 INJECTION, SOLUTION INTRAVENOUS at 15:42

## 2025-08-07 RX ADMIN — SODIUM CHLORIDE, POTASSIUM CHLORIDE, SODIUM LACTATE AND CALCIUM CHLORIDE: 600; 310; 30; 20 INJECTION, SOLUTION INTRAVENOUS at 15:31

## 2025-08-07 RX ADMIN — SODIUM CHLORIDE 1000 ML: 9 INJECTION, SOLUTION INTRAVENOUS at 11:03

## 2025-08-07 RX ADMIN — METOPROLOL TARTRATE 2.5 MG: 1 INJECTION, SOLUTION INTRAVENOUS at 15:47

## 2025-08-07 RX ADMIN — LIDOCAINE HYDROCHLORIDE 60 MG: 20 INJECTION, SOLUTION EPIDURAL; INFILTRATION; INTRACAUDAL; PERINEURAL at 15:31

## 2025-08-07 RX ADMIN — PROPOFOL 100 MCG/KG/MIN: 10 INJECTION, EMULSION INTRAVENOUS at 15:31

## 2025-08-19 ENCOUNTER — OFFICE VISIT (OUTPATIENT)
Dept: SURGERY | Age: 75
End: 2025-08-19
Payer: MEDICARE

## 2025-08-19 VITALS — BODY MASS INDEX: 27.89 KG/M2 | HEIGHT: 68 IN | WEIGHT: 184 LBS

## 2025-08-19 DIAGNOSIS — K22.2 ESOPHAGEAL OBSTRUCTION: Primary | ICD-10-CM

## 2025-08-19 PROCEDURE — 99212 OFFICE O/P EST SF 10 MIN: CPT | Performed by: SURGERY

## 2025-08-19 PROCEDURE — 1160F RVW MEDS BY RX/DR IN RCRD: CPT | Performed by: SURGERY

## 2025-08-19 PROCEDURE — 1159F MED LIST DOCD IN RCRD: CPT | Performed by: SURGERY

## (undated) DEVICE — PK CATH CARD 10

## (undated) DEVICE — DRAPE,UTILTY,TAPE,15X26, 4EA/PK: Brand: MEDLINE

## (undated) DEVICE — DEV INFL ALLIANCE2 SYS

## (undated) DEVICE — DEV COMP RAD PRELUDESYNC 24CM

## (undated) DEVICE — PK BASIC 70

## (undated) DEVICE — FRCP BX RADJAW4 NDL 2.8 240CM LG OG BX40

## (undated) DEVICE — Device

## (undated) DEVICE — GLV SURG PREMIERPRO MIC LTX PF SZ7.5 BRN

## (undated) DEVICE — MODEL AT P65, P/N 701554-001KIT CONTENTS: HAND CONTROLLER, 3-WAY HIGH-PRESSURE STOPCOCK WITH ROTATING END AND PREMIUM HIGH-PRESSURE TUBING: Brand: ANGIOTOUCH® KIT

## (undated) DEVICE — HOLDER: Brand: DEROYAL

## (undated) DEVICE — SYR LUERLOK 30CC

## (undated) DEVICE — Device: Brand: DEFENDO AIR/WATER/SUCTION AND BIOPSY VALVE

## (undated) DEVICE — CAPS TRANSMTR ENDOSC PH MONTR BRAVO

## (undated) DEVICE — THE BITE BLOCK MAXI, LATEX FREE STRAP IS USED TO PROTECT THE ENDOSCOPE INSERTION TUBE FROM BEING BITTEN BY THE PATIENT.

## (undated) DEVICE — HEAD AND NECK PACK: Brand: MEDLINE INDUSTRIES, INC.

## (undated) DEVICE — TUBING, SUCTION, 1/4" X 20', STRAIGHT: Brand: MEDLINE INDUSTRIES, INC.

## (undated) DEVICE — MODEL BT2000 P/N 700287-012KIT CONTENTS: MANIFOLD WITH SALINE AND CONTRAST PORTS, SALINE TUBING WITH SPIKE AND HAND SYRINGE, TRANSDUCER: Brand: BT2000 AUTOMATED MANIFOLD KIT

## (undated) DEVICE — ESOPHAGEAL BALLOON DILATATION CATHETER: Brand: CRE FIXED WIRE

## (undated) DEVICE — RADIFOCUS GLIDEWIRE: Brand: GLIDEWIRE

## (undated) DEVICE — SPNG GZ WOVN 4X4IN 12PLY 10/BX STRL

## (undated) DEVICE — APPL CHLORAPREP HI/LITE 26ML ORNG

## (undated) DEVICE — CVR PROB 96IN LF STRL

## (undated) DEVICE — GOWN,REINF,POLY,ECL,PP SLV,XL: Brand: MEDLINE

## (undated) DEVICE — CATH DIAG EXPO .056 FL3.5 6F 100CM

## (undated) DEVICE — PROXIMATE RH ROTATING HEAD SKIN STAPLERS (35 WIDE) CONTAINS 35 STAINLESS STEEL STAPLES: Brand: PROXIMATE

## (undated) DEVICE — DBD-DRAPE,LAP,CHOLE,W/TROUGHS,STERILE: Brand: MEDLINE

## (undated) DEVICE — INTRO SHEATH PRELUDE IDEAL SPRNG COIL 021 6F 23X80CM

## (undated) DEVICE — CATH DIAG EXPO M/ PK 6FR FL4/FR4 PIG 3PK

## (undated) DEVICE — SUT ETHLN 4/0 PS2 PLSTC 1667G

## (undated) DEVICE — SUT VIC 3/0 SH 27IN J416H

## (undated) DEVICE — SINGLE PORT MANIFOLD: Brand: NEPTUNE 2

## (undated) DEVICE — SUT SILK 2/0 TIES 30IN SA85H